# Patient Record
Sex: FEMALE | Race: WHITE | NOT HISPANIC OR LATINO | Employment: UNEMPLOYED | URBAN - NONMETROPOLITAN AREA
[De-identification: names, ages, dates, MRNs, and addresses within clinical notes are randomized per-mention and may not be internally consistent; named-entity substitution may affect disease eponyms.]

---

## 2017-03-14 ENCOUNTER — LAB (OUTPATIENT)
Dept: FAMILY MEDICINE CLINIC | Facility: CLINIC | Age: 72
End: 2017-03-14

## 2017-03-14 DIAGNOSIS — Z00.00 HEALTHCARE MAINTENANCE: ICD-10-CM

## 2017-03-14 DIAGNOSIS — E03.4 HYPOTHYROIDISM DUE TO ACQUIRED ATROPHY OF THYROID: ICD-10-CM

## 2017-03-14 DIAGNOSIS — E55.9 VITAMIN D DEFICIENCY: ICD-10-CM

## 2017-03-14 LAB
25(OH)D3 SERPL-MCNC: 42 NG/ML
ALBUMIN SERPL-MCNC: 4.2 G/DL (ref 3.4–4.8)
ALBUMIN/GLOB SERPL: 1.2 G/DL (ref 1.5–2.5)
ALP SERPL-CCNC: 95 U/L (ref 35–104)
ALT SERPL W P-5'-P-CCNC: 15 U/L (ref 10–36)
ANION GAP SERPL CALCULATED.3IONS-SCNC: 6.2 MMOL/L (ref 3.6–11.2)
AST SERPL-CCNC: 24 U/L (ref 10–30)
BASOPHILS # BLD AUTO: 0.06 10*3/MM3 (ref 0–0.3)
BASOPHILS NFR BLD AUTO: 0.9 % (ref 0–2)
BILIRUB SERPL-MCNC: 0.5 MG/DL (ref 0.2–1.8)
BUN BLD-MCNC: 12 MG/DL (ref 7–21)
BUN/CREAT SERPL: 13 (ref 7–25)
CALCIUM SPEC-SCNC: 9.4 MG/DL (ref 7.7–10)
CHLORIDE SERPL-SCNC: 104 MMOL/L (ref 99–112)
CHOLEST SERPL-MCNC: 198 MG/DL (ref 0–200)
CO2 SERPL-SCNC: 29.8 MMOL/L (ref 24.3–31.9)
CREAT BLD-MCNC: 0.92 MG/DL (ref 0.43–1.29)
DEPRECATED RDW RBC AUTO: 44.2 FL (ref 37–54)
EOSINOPHIL # BLD AUTO: 0.27 10*3/MM3 (ref 0–0.7)
EOSINOPHIL NFR BLD AUTO: 4.1 % (ref 0–7)
ERYTHROCYTE [DISTWIDTH] IN BLOOD BY AUTOMATED COUNT: 13.5 % (ref 11.5–14.5)
GFR SERPL CREATININE-BSD FRML MDRD: 60 ML/MIN/1.73
GLOBULIN UR ELPH-MCNC: 3.5 GM/DL
GLUCOSE BLD-MCNC: 97 MG/DL (ref 70–110)
HCT VFR BLD AUTO: 41.9 % (ref 37–47)
HDLC SERPL-MCNC: 49 MG/DL (ref 60–100)
HGB BLD-MCNC: 13.4 G/DL (ref 12–16)
IMM GRANULOCYTES # BLD: 0.01 10*3/MM3 (ref 0–0.03)
IMM GRANULOCYTES NFR BLD: 0.2 % (ref 0–0.5)
LDLC SERPL CALC-MCNC: 126 MG/DL (ref 0–100)
LDLC/HDLC SERPL: 2.58 {RATIO}
LYMPHOCYTES # BLD AUTO: 1.91 10*3/MM3 (ref 1–3)
LYMPHOCYTES NFR BLD AUTO: 29.2 % (ref 16–46)
MCH RBC QN AUTO: 29.3 PG (ref 27–33)
MCHC RBC AUTO-ENTMCNC: 32 G/DL (ref 33–37)
MCV RBC AUTO: 91.7 FL (ref 80–94)
MONOCYTES # BLD AUTO: 0.51 10*3/MM3 (ref 0.1–0.9)
MONOCYTES NFR BLD AUTO: 7.8 % (ref 0–12)
NEUTROPHILS # BLD AUTO: 3.79 10*3/MM3 (ref 1.4–6.5)
NEUTROPHILS NFR BLD AUTO: 57.8 % (ref 40–75)
OSMOLALITY SERPL CALC.SUM OF ELEC: 279.1 MOSM/KG (ref 273–305)
PLATELET # BLD AUTO: 389 10*3/MM3 (ref 130–400)
PMV BLD AUTO: 11.2 FL (ref 6–10)
POTASSIUM BLD-SCNC: 3.9 MMOL/L (ref 3.5–5.3)
PROT SERPL-MCNC: 7.7 G/DL (ref 6–8)
RBC # BLD AUTO: 4.57 10*6/MM3 (ref 4.2–5.4)
SODIUM BLD-SCNC: 140 MMOL/L (ref 135–153)
TRIGL SERPL-MCNC: 114 MG/DL (ref 0–150)
TSH SERPL DL<=0.05 MIU/L-ACNC: 4.71 MIU/ML (ref 0.55–4.78)
VLDLC SERPL-MCNC: 22.8 MG/DL
WBC NRBC COR # BLD: 6.55 10*3/MM3 (ref 4.5–12.5)

## 2017-03-14 PROCEDURE — 85025 COMPLETE CBC W/AUTO DIFF WBC: CPT | Performed by: GENERAL PRACTICE

## 2017-03-14 PROCEDURE — 84443 ASSAY THYROID STIM HORMONE: CPT | Performed by: GENERAL PRACTICE

## 2017-03-14 PROCEDURE — 82306 VITAMIN D 25 HYDROXY: CPT | Performed by: GENERAL PRACTICE

## 2017-03-14 PROCEDURE — 80061 LIPID PANEL: CPT | Performed by: GENERAL PRACTICE

## 2017-03-14 PROCEDURE — 36415 COLL VENOUS BLD VENIPUNCTURE: CPT

## 2017-03-14 PROCEDURE — 80053 COMPREHEN METABOLIC PANEL: CPT | Performed by: GENERAL PRACTICE

## 2017-03-28 ENCOUNTER — OFFICE VISIT (OUTPATIENT)
Dept: FAMILY MEDICINE CLINIC | Facility: CLINIC | Age: 72
End: 2017-03-28

## 2017-03-28 DIAGNOSIS — E03.4 HYPOTHYROIDISM DUE TO ACQUIRED ATROPHY OF THYROID: ICD-10-CM

## 2017-03-28 DIAGNOSIS — Z12.31 ENCOUNTER FOR SCREENING MAMMOGRAM FOR BREAST CANCER: ICD-10-CM

## 2017-03-28 DIAGNOSIS — G89.29 CHRONIC PAIN OF BOTH KNEES: ICD-10-CM

## 2017-03-28 DIAGNOSIS — F32.89 OTHER DEPRESSION: ICD-10-CM

## 2017-03-28 DIAGNOSIS — N95.1 MENOPAUSAL SYMPTOM: ICD-10-CM

## 2017-03-28 DIAGNOSIS — E66.01 MORBID OBESITY, UNSPECIFIED OBESITY TYPE (HCC): ICD-10-CM

## 2017-03-28 DIAGNOSIS — Z00.00 HEALTHCARE MAINTENANCE: ICD-10-CM

## 2017-03-28 DIAGNOSIS — M25.561 CHRONIC PAIN OF BOTH KNEES: ICD-10-CM

## 2017-03-28 DIAGNOSIS — M25.562 CHRONIC PAIN OF BOTH KNEES: ICD-10-CM

## 2017-03-28 DIAGNOSIS — E55.9 VITAMIN D DEFICIENCY: Primary | ICD-10-CM

## 2017-03-28 DIAGNOSIS — M85.80 OSTEOPENIA: ICD-10-CM

## 2017-03-28 PROCEDURE — 99214 OFFICE O/P EST MOD 30 MIN: CPT | Performed by: GENERAL PRACTICE

## 2017-03-28 RX ORDER — PHENTERMINE HYDROCHLORIDE 37.5 MG/1
37.5 TABLET ORAL
Qty: 30 TABLET | Refills: 5 | Status: SHIPPED | OUTPATIENT
Start: 2017-03-28 | End: 2017-09-27 | Stop reason: SDUPTHER

## 2017-03-28 RX ORDER — ESTRADIOL 0.1 MG/G
CREAM VAGINAL
Qty: 42.5 G | Refills: 5 | Status: SHIPPED | OUTPATIENT
Start: 2017-03-28 | End: 2017-09-27 | Stop reason: SDUPTHER

## 2017-03-28 RX ORDER — LEVOTHYROXINE SODIUM 112 UG/1
112 TABLET ORAL DAILY
Qty: 30 TABLET | Refills: 5 | Status: SHIPPED | OUTPATIENT
Start: 2017-03-28 | End: 2017-09-27 | Stop reason: SDUPTHER

## 2017-03-28 NOTE — PROGRESS NOTES
Subjective   Tennille Gasca is a 72 y.o. female.     History of Present Illness     Knee Pain  There has been some improvement in her knee pain since last here.  There has been no change in the quality nor any new associated symptoms.  Current symptoms include pain located anteriorly and stiffness. Pain is aggravated by rising after sitting and prolonged weightbearing. Patient has had no prior knee problems. Evaluation to date: none. Treatment to date: none.    Depression  Onset was a number of years ago. Symptoms have remained controlled since last here. Current symptoms include: insomnia. Patient denies depressed mood, anhedonia, difficulty concentrating, impaired memory, recurrent thoughts of death and suicidal thoughts.     Hypothyroidism  Patient presents for evaluation of thyroid function. Symptoms consist of fatigue, weight gain. The symptoms are mild.  The problem has been unchanged.  Previous thyroid studies include TSH. The hypothyroidism is due to Hashimoto's disease. She is currently prescribed levothyroxine. Most recent TSH:   Lab Results   Component Value Date    TSH 4.715 03/14/2017     Labs  Most recent fasting glucose 97.  , HDL 49, and .  Vitamin D 42    The following portions of the patient's history were reviewed and updated as appropriate: allergies, current medications, past medical history, past social history and problem list.    Review of Systems   Constitutional: Positive for fatigue. Negative for appetite change, chills, fever and unexpected weight change.   HENT: Negative for congestion, ear pain, rhinorrhea, sneezing, sore throat and voice change.    Eyes: Negative for visual disturbance.   Respiratory: Negative for cough, shortness of breath and wheezing.    Cardiovascular: Negative for chest pain, palpitations and leg swelling.   Gastrointestinal: Negative for abdominal pain, blood in stool, constipation, diarrhea, nausea and vomiting.   Endocrine: Negative for  polydipsia.   Genitourinary: Negative for difficulty urinating, dysuria, frequency, hematuria, menstrual problem, pelvic pain, urgency, vaginal bleeding and vaginal discharge.   Musculoskeletal: Positive for arthralgias. Negative for back pain, joint swelling, myalgias and neck pain.   Skin: Negative for color change.   Neurological: Negative for tremors, weakness, numbness and headaches.   Psychiatric/Behavioral: Positive for sleep disturbance. Negative for dysphoric mood and suicidal ideas. The patient is not nervous/anxious.      Objective   Physical Exam   Constitutional: She is oriented to person, place, and time. No distress.   Bright and in good spirits. No apparent distress. No pallor, jaundice, diaphoresis, or cyanosis   HENT:   Head: Atraumatic.   Right Ear: Tympanic membrane, external ear and ear canal normal.   Left Ear: Tympanic membrane, external ear and ear canal normal.   Nose: Nose normal.   Mouth/Throat: Oropharynx is clear and moist. Mucous membranes are not pale and not cyanotic.   Eyes: EOM are normal. Pupils are equal, round, and reactive to light. No scleral icterus.   Neck: No JVD present. Carotid bruit is not present. No tracheal deviation present. No thyromegaly present.   Cardiovascular: Normal rate, regular rhythm, S1 normal, S2 normal and intact distal pulses.  Exam reveals no gallop, no S3 and no S4.    No murmur heard.  Pulmonary/Chest: Breath sounds normal. No stridor. No respiratory distress.   Abdominal: Soft. Normal aorta and bowel sounds are normal. She exhibits no distension, no abdominal bruit and no mass. There is no hepatosplenomegaly. There is no tenderness. No hernia.   Musculoskeletal: She exhibits no deformity.       Vascular Status -  Her exam exhibits no right foot edema. Her exam exhibits no left foot edema.  Lymphadenopathy:        Head (right side): No submandibular adenopathy present.        Head (left side): No submandibular adenopathy present.     She has no  cervical adenopathy.   Neurological: She is alert and oriented to person, place, and time. She has normal reflexes. She displays normal reflexes. No cranial nerve deficit. She exhibits normal muscle tone. Coordination normal.   Skin: Skin is warm and dry. No rash noted. She is not diaphoretic. No cyanosis. No pallor. Nails show no clubbing.   Psychiatric: She has a normal mood and affect.     Assessment/Plan   Problems Addressed this Visit        Digestive    Vitamin D deficiency    Morbid obesity  Encouraged to continue to work on her diet and exercise plan.  Continue current treatment     Relevant Medications    phentermine (ADIPEX-P) 37.5 MG tablet       Endocrine    Hypothyroidism due to acquired atrophy of thyroid  Clinically euthyroid.   Continue current treatment.    Relevant Medications    levothyroxine (SYNTHROID, LEVOTHROID) 112 MCG tablet       Musculoskeletal and Integument    Osteopenia    Bilateral knee pain  Probable early osteoarthritis.  Reminded regarding appropriate exercises and joint protection.  Patient remains uninterested in pursuing anything further at present but will report if any worse or if any new symptoms whatsoever       Genitourinary    Menopausal symptom    Relevant Medications    estradiol (ESTRACE) 0.1 MG/GM vaginal cream       Other    Depression  Remains in remission.  Supportive therapy.    Continue current treatment     Healthcare maintenance  Patient had a normal colonoscopy on 6/23/11 and has no family history of early colon cancer.  She would like to defer her next study for now    Encounter for screening mammogram for breast cancer   Relevant Orders   Mammo Screening Digital Tomosynthesis Bilateral With CAD

## 2017-03-29 VITALS
TEMPERATURE: 98.2 F | DIASTOLIC BLOOD PRESSURE: 75 MMHG | BODY MASS INDEX: 35.79 KG/M2 | OXYGEN SATURATION: 99 % | RESPIRATION RATE: 12 BRPM | WEIGHT: 228 LBS | HEART RATE: 90 BPM | SYSTOLIC BLOOD PRESSURE: 125 MMHG | HEIGHT: 67 IN

## 2017-08-07 ENCOUNTER — APPOINTMENT (OUTPATIENT)
Dept: MAMMOGRAPHY | Facility: HOSPITAL | Age: 72
End: 2017-08-07

## 2017-09-27 DIAGNOSIS — N95.1 MENOPAUSAL SYMPTOM: ICD-10-CM

## 2017-09-27 DIAGNOSIS — E03.4 HYPOTHYROIDISM DUE TO ACQUIRED ATROPHY OF THYROID: ICD-10-CM

## 2017-09-27 DIAGNOSIS — E66.01 MORBID OBESITY, UNSPECIFIED OBESITY TYPE (HCC): ICD-10-CM

## 2017-09-27 RX ORDER — PHENTERMINE HYDROCHLORIDE 37.5 MG/1
37.5 TABLET ORAL
Qty: 30 TABLET | Refills: 5 | Status: SHIPPED | OUTPATIENT
Start: 2017-09-27 | End: 2017-11-27 | Stop reason: SDUPTHER

## 2017-09-27 RX ORDER — ESTRADIOL 0.1 MG/G
CREAM VAGINAL
Qty: 42.5 G | Refills: 5 | Status: SHIPPED | OUTPATIENT
Start: 2017-09-27 | End: 2018-02-05 | Stop reason: SDUPTHER

## 2017-09-27 RX ORDER — LEVOTHYROXINE SODIUM 112 UG/1
112 TABLET ORAL DAILY
Qty: 30 TABLET | Refills: 5 | Status: SHIPPED | OUTPATIENT
Start: 2017-09-27 | End: 2018-05-04

## 2017-10-12 ENCOUNTER — OFFICE VISIT (OUTPATIENT)
Dept: FAMILY MEDICINE CLINIC | Facility: CLINIC | Age: 72
End: 2017-10-12

## 2017-10-12 VITALS
HEART RATE: 92 BPM | DIASTOLIC BLOOD PRESSURE: 70 MMHG | WEIGHT: 221 LBS | SYSTOLIC BLOOD PRESSURE: 140 MMHG | RESPIRATION RATE: 12 BRPM | HEIGHT: 67 IN | TEMPERATURE: 98.6 F | OXYGEN SATURATION: 90 % | BODY MASS INDEX: 34.69 KG/M2

## 2017-10-12 DIAGNOSIS — R30.0 DYSURIA: Primary | ICD-10-CM

## 2017-10-12 DIAGNOSIS — E55.9 VITAMIN D DEFICIENCY: ICD-10-CM

## 2017-10-12 DIAGNOSIS — Z00.00 HEALTHCARE MAINTENANCE: ICD-10-CM

## 2017-10-12 LAB
BACTERIA UR QL AUTO: ABNORMAL /HPF
BILIRUB BLD-MCNC: NEGATIVE MG/DL
BILIRUB UR QL STRIP: NEGATIVE
CLARITY UR: ABNORMAL
CLARITY, POC: CLEAR
COLOR UR: NORMAL
COLOR UR: YELLOW
GLUCOSE UR STRIP-MCNC: NEGATIVE MG/DL
GLUCOSE UR STRIP-MCNC: NEGATIVE MG/DL
HGB UR QL STRIP.AUTO: NEGATIVE
HYALINE CASTS UR QL AUTO: ABNORMAL /LPF
KETONES UR QL STRIP: NEGATIVE
KETONES UR QL: NEGATIVE
LEUKOCYTE EST, POC: NEGATIVE
LEUKOCYTE ESTERASE UR QL STRIP.AUTO: ABNORMAL
NITRITE UR QL STRIP: NEGATIVE
NITRITE UR-MCNC: NEGATIVE MG/ML
PH UR STRIP.AUTO: <=5 [PH] (ref 5–8)
PH UR: 5.5 [PH] (ref 5–8)
PROT UR QL STRIP: NEGATIVE
PROT UR STRIP-MCNC: NEGATIVE MG/DL
RBC # UR STRIP: NEGATIVE /UL
RBC # UR: ABNORMAL /HPF
REF LAB TEST METHOD: ABNORMAL
SP GR UR STRIP: 1.01 (ref 1–1.03)
SP GR UR: 1.02 (ref 1–1.03)
SQUAMOUS #/AREA URNS HPF: ABNORMAL /HPF
UROBILINOGEN UR QL STRIP: ABNORMAL
UROBILINOGEN UR QL: NORMAL
WBC UR QL AUTO: ABNORMAL /HPF

## 2017-10-12 PROCEDURE — 81001 URINALYSIS AUTO W/SCOPE: CPT | Performed by: GENERAL PRACTICE

## 2017-10-12 PROCEDURE — 99212 OFFICE O/P EST SF 10 MIN: CPT | Performed by: GENERAL PRACTICE

## 2017-10-12 PROCEDURE — 87086 URINE CULTURE/COLONY COUNT: CPT | Performed by: GENERAL PRACTICE

## 2017-10-12 PROCEDURE — 81003 URINALYSIS AUTO W/O SCOPE: CPT | Performed by: GENERAL PRACTICE

## 2017-10-12 NOTE — PROGRESS NOTES
Subjective   Tennille Gasca is a 72 y.o. female.     History of Present Illness     Dysuria  Since waking this morning she has had mild dysuria.  There is no history of any frequency, nocturia, urgency, hematuria, suprapubic pain, or back pain.  There is no history of any vaginal discharge or pruritus and she denies any fever or chills.  She has been on no recent antibiotics.    The following portions of the patient's history were reviewed and updated as appropriate: allergies, current medications, past medical history and problem list.    Review of Systems   Constitutional: Positive for fatigue. Negative for appetite change, chills, fever and unexpected weight change.   HENT: Negative for congestion, ear pain, rhinorrhea, sneezing, sore throat and voice change.    Eyes: Negative for visual disturbance.   Respiratory: Negative for cough, shortness of breath and wheezing.    Cardiovascular: Negative for chest pain, palpitations and leg swelling.   Gastrointestinal: Negative for abdominal pain, blood in stool, constipation, diarrhea, nausea and vomiting.   Endocrine: Negative for polydipsia.   Genitourinary: Positive for dysuria. Negative for difficulty urinating, frequency, hematuria, menstrual problem, pelvic pain, urgency, vaginal bleeding and vaginal discharge.   Musculoskeletal: Positive for arthralgias. Negative for back pain, joint swelling, myalgias and neck pain.   Skin: Negative for color change.   Neurological: Negative for tremors, weakness, numbness and headaches.   Psychiatric/Behavioral: Positive for sleep disturbance. Negative for dysphoric mood and suicidal ideas. The patient is not nervous/anxious.      Objective   Physical Exam   Constitutional: She is oriented to person, place, and time. No distress.   Alert and in fair spirits. No apparent distress. No pallor, jaundice, diaphoresis, or cyanosis   HENT:   Head: Atraumatic.   Right Ear: Tympanic membrane, external ear and ear canal normal.   Left  Ear: Tympanic membrane, external ear and ear canal normal.   Nose: Nose normal.   Mouth/Throat: Oropharynx is clear and moist. Mucous membranes are not pale and not cyanotic.   Eyes: EOM are normal. Pupils are equal, round, and reactive to light. No scleral icterus.   Neck: No JVD present. Carotid bruit is not present. No tracheal deviation present. No thyromegaly present.   Cardiovascular: Normal rate, regular rhythm, S1 normal, S2 normal and intact distal pulses.  Exam reveals no gallop, no S3 and no S4.    No murmur heard.  Pulmonary/Chest: Breath sounds normal. No stridor. No respiratory distress.   Abdominal: Soft. Normal aorta and bowel sounds are normal. She exhibits no distension, no abdominal bruit and no mass. There is no hepatosplenomegaly. There is no tenderness. No hernia.   Musculoskeletal: She exhibits no deformity.       Vascular Status -  Her exam exhibits no right foot edema. Her exam exhibits no left foot edema.  Lymphadenopathy:        Head (right side): No submandibular adenopathy present.        Head (left side): No submandibular adenopathy present.     She has no cervical adenopathy.   Neurological: She is alert and oriented to person, place, and time. No cranial nerve deficit.   Skin: Skin is warm and dry. No rash noted. She is not diaphoretic. No cyanosis. No pallor. Nails show no clubbing.   Psychiatric: She has a normal mood and affect.     Assessment/Plan   Problems Addressed this Visit        Nervous and Auditory   Dysuria   Mild.  No associated symptoms.  Unremarkable urinalysis  Reviewed options.  Will monitor.  Patient will report if any worse, any new symptoms, or if this does not resolve over the next day or two   Relevant Orders   POCT urinalysis dipstick, automated (Completed)   Urine Culture - Urine, Urine, Clean Catch   Urinalysis With Microscopic - Urine, Clean Catch (Completed)   Urinalysis - Urine, Clean Catch (Completed)   Urinalysis, Microscopic Only - Urine, Clean Catch  (Completed)      Other   Healthcare maintenance  Reminded to get a flu shot when available

## 2017-10-14 LAB — BACTERIA SPEC AEROBE CULT: NORMAL

## 2017-11-27 ENCOUNTER — OFFICE VISIT (OUTPATIENT)
Dept: FAMILY MEDICINE CLINIC | Facility: CLINIC | Age: 72
End: 2017-11-27

## 2017-11-27 VITALS
BODY MASS INDEX: 34.37 KG/M2 | RESPIRATION RATE: 12 BRPM | TEMPERATURE: 98.9 F | OXYGEN SATURATION: 96 % | DIASTOLIC BLOOD PRESSURE: 80 MMHG | WEIGHT: 219 LBS | HEART RATE: 100 BPM | SYSTOLIC BLOOD PRESSURE: 155 MMHG | HEIGHT: 67 IN

## 2017-11-27 DIAGNOSIS — E66.01 MORBID OBESITY, UNSPECIFIED OBESITY TYPE (HCC): ICD-10-CM

## 2017-11-27 DIAGNOSIS — R63.5 ABNORMAL WEIGHT GAIN: ICD-10-CM

## 2017-11-27 DIAGNOSIS — Z23 ENCOUNTER FOR IMMUNIZATION: ICD-10-CM

## 2017-11-27 DIAGNOSIS — E55.9 VITAMIN D DEFICIENCY: Primary | ICD-10-CM

## 2017-11-27 DIAGNOSIS — R03.0 ELEVATED BLOOD-PRESSURE READING WITHOUT DIAGNOSIS OF HYPERTENSION: ICD-10-CM

## 2017-11-27 DIAGNOSIS — M25.561 CHRONIC PAIN OF BOTH KNEES: ICD-10-CM

## 2017-11-27 DIAGNOSIS — F33.41 RECURRENT MAJOR DEPRESSIVE DISORDER, IN PARTIAL REMISSION (HCC): ICD-10-CM

## 2017-11-27 DIAGNOSIS — M85.80 OSTEOPENIA, UNSPECIFIED LOCATION: ICD-10-CM

## 2017-11-27 DIAGNOSIS — M25.562 CHRONIC PAIN OF BOTH KNEES: ICD-10-CM

## 2017-11-27 DIAGNOSIS — G89.29 CHRONIC PAIN OF BOTH KNEES: ICD-10-CM

## 2017-11-27 DIAGNOSIS — N95.1 MENOPAUSAL SYMPTOM: ICD-10-CM

## 2017-11-27 DIAGNOSIS — Z00.00 HEALTHCARE MAINTENANCE: ICD-10-CM

## 2017-11-27 DIAGNOSIS — Z86.19 H/O CANDIDAL VULVOVAGINITIS: ICD-10-CM

## 2017-11-27 DIAGNOSIS — E66.01 MORBID OBESITY (HCC): ICD-10-CM

## 2017-11-27 DIAGNOSIS — E03.4 HYPOTHYROIDISM DUE TO ACQUIRED ATROPHY OF THYROID: ICD-10-CM

## 2017-11-27 PROBLEM — R30.0 DYSURIA: Status: RESOLVED | Noted: 2017-10-12 | Resolved: 2017-11-27

## 2017-11-27 PROCEDURE — 99214 OFFICE O/P EST MOD 30 MIN: CPT | Performed by: GENERAL PRACTICE

## 2017-11-27 PROCEDURE — 90686 IIV4 VACC NO PRSV 0.5 ML IM: CPT | Performed by: GENERAL PRACTICE

## 2017-11-27 PROCEDURE — G0008 ADMIN INFLUENZA VIRUS VAC: HCPCS | Performed by: GENERAL PRACTICE

## 2017-11-27 RX ORDER — FLUCONAZOLE 150 MG/1
150 TABLET ORAL DAILY
Qty: 6 TABLET | Refills: 0 | Status: SHIPPED | OUTPATIENT
Start: 2017-11-27 | End: 2018-05-04

## 2017-11-27 RX ORDER — PHENTERMINE HYDROCHLORIDE 37.5 MG/1
37.5 TABLET ORAL
Qty: 30 TABLET | Refills: 5 | Status: SHIPPED | OUTPATIENT
Start: 2017-11-27 | End: 2018-05-04 | Stop reason: SDUPTHER

## 2017-11-27 NOTE — PROGRESS NOTES
Subjective   Tennille Gasca is a 72 y.o. female.     History of Present Illness     Depression  Onset was a number of years ago. Symptoms have been somewhat worse over the last four months as her  has struggled with a series of health problems.  Current symptoms include: nervousness and worrying. Patient denies depressed mood, anhedonia, difficulty concentrating, impaired memory, recurrent thoughts of death and suicidal thoughts.  With melatonin she is averaging 7-8 hours of sleep nightly.    Knee Pain  She continues to have intermittent knee pain as previously described. There has been no change in the quality nor any new associated symptoms.  Current symptoms include pain located anteriorly and stiffness. Pain is aggravated by rising after sitting and prolonged weightbearing. Patient has had no prior knee problems. Evaluation to date: none. Treatment to date: none.    Hypothyroidism  Patient returns for evaluation of thyroid function. Symptoms consist of fatigue, weight gain. The symptoms are mild.  The problem has been unchanged.  Previous thyroid studies include TSH. The hypothyroidism is due to Hashimoto's disease. She is currently prescribed levothyroxine. Most recent TSH:   Lab Results   Component Value Date    TSH 4.715 03/14/2017     Labs  Most recent fasting glucose 97.  , HDL 49, and .  Vitamin D 42    The following portions of the patient's history were reviewed and updated as appropriate: allergies, current medications, past medical history, past social history and problem list.    Review of Systems   Constitutional: Positive for fatigue. Negative for appetite change, chills, fever and unexpected weight change.   HENT: Negative for congestion, ear pain, rhinorrhea, sneezing, sore throat and voice change.    Eyes: Negative for visual disturbance.   Respiratory: Negative for cough, shortness of breath and wheezing.    Cardiovascular: Negative for chest pain, palpitations and leg  swelling.   Gastrointestinal: Negative for abdominal pain, blood in stool, constipation, diarrhea, nausea and vomiting.   Endocrine: Negative for polydipsia.   Genitourinary: Negative for difficulty urinating, dysuria, frequency, hematuria, menstrual problem, pelvic pain, urgency, vaginal bleeding and vaginal discharge.   Musculoskeletal: Positive for arthralgias. Negative for back pain, joint swelling, myalgias and neck pain.   Skin: Negative for color change.   Neurological: Negative for tremors, weakness, numbness and headaches.   Psychiatric/Behavioral: Negative for dysphoric mood, sleep disturbance and suicidal ideas. The patient is nervous/anxious.      Objective   Physical Exam   Constitutional: She is oriented to person, place, and time. No distress.   Alert and in fair spirits. No apparent distress. No pallor, jaundice, diaphoresis, or cyanosis   HENT:   Head: Atraumatic.   Right Ear: Tympanic membrane, external ear and ear canal normal.   Left Ear: Tympanic membrane, external ear and ear canal normal.   Nose: Nose normal.   Mouth/Throat: Oropharynx is clear and moist. Mucous membranes are not pale and not cyanotic.   Eyes: EOM are normal. Pupils are equal, round, and reactive to light. No scleral icterus.   Neck: No JVD present. Carotid bruit is not present. No tracheal deviation present. No thyromegaly present.   Cardiovascular: Normal rate, regular rhythm, S1 normal, S2 normal and intact distal pulses.  Exam reveals no gallop, no S3 and no S4.    No murmur heard.  Pulmonary/Chest: Breath sounds normal. No stridor. No respiratory distress.   Abdominal: Soft. Normal aorta and bowel sounds are normal. She exhibits no distension, no abdominal bruit and no mass. There is no hepatosplenomegaly. There is no tenderness. No hernia.   Musculoskeletal: She exhibits no deformity.       Vascular Status -  Her exam exhibits no right foot edema. Her exam exhibits no left foot edema.  Lymphadenopathy:        Head (right  side): No submandibular adenopathy present.        Head (left side): No submandibular adenopathy present.     She has no cervical adenopathy.   Neurological: She is alert and oriented to person, place, and time. No cranial nerve deficit.   Skin: Skin is warm and dry. No rash noted. She is not diaphoretic. No cyanosis. No pallor. Nails show no clubbing.   Psychiatric: She has a normal mood and affect.     Assessment/Plan   Problems Addressed this Visit        Digestive    Vitamin D deficiency  Continue maintenance supplementation with monitoring    Morbid obesity  Encouraged to continue to work on her diet and exercise plan.  Continue current medication  Updated labs will be drawn in 4 months     Relevant Medications    phentermine (ADIPEX-P) 37.5 MG tablet    Other Relevant Orders    TSH       Endocrine    Hypothyroidism due to acquired atrophy of thyroid  Clinically euthyroid.   Continue current medication       Musculoskeletal and Integument    Osteopenia    Relevant Orders    Vitamin D 25 Hydroxy    Bilateral knee pain  Reminded regarding symptomatic treatment.        Genitourinary    Menopausal symptom       Other    Recurrent major depressive disorder, in partial remission    Significant situational component. Supportive therapy.         Healthcare maintenance  Recommended a flu shot  Hepatitis C screen will be performed with her next labs     Relevant Orders    Hepatitis C Antibody    Flu Vaccine Quad PF 3YR+ (Completed)    H/O candidal vulvovaginitis    Relevant Medications    fluconazole (DIFLUCAN) 150 MG tablet    Elevated blood-pressure reading without diagnosis of hypertension  Advise regarding lifestyle modification  Will continue to monitor    Relevant Orders    CBC & Differential    Comprehensive Metabolic Panel    Lipid Panel    TSH    Encounter for immunization    Relevant Orders    Flu Vaccine Quad PF 3YR+ (Completed)

## 2018-01-07 DIAGNOSIS — E03.4 HYPOTHYROIDISM DUE TO ACQUIRED ATROPHY OF THYROID: ICD-10-CM

## 2018-01-08 RX ORDER — LEVOTHYROXINE SODIUM 112 MCG
TABLET ORAL
Qty: 30 TABLET | Refills: 4 | Status: SHIPPED | OUTPATIENT
Start: 2018-01-08 | End: 2018-05-04 | Stop reason: SDUPTHER

## 2018-02-02 ENCOUNTER — TELEPHONE (OUTPATIENT)
Dept: FAMILY MEDICINE CLINIC | Facility: CLINIC | Age: 73
End: 2018-02-02

## 2018-02-02 NOTE — TELEPHONE ENCOUNTER
----- Message from Alfonso Rubi MD sent at 1/31/2018  8:37 AM EST -----  Ok - she can run the estrace again and see what the copay will be - if too high she should let us know and I will send the premarin    ----- Message -----     From: Tania Camara MA     Sent: 1/30/2018   6:04 PM       To: Alfonso Rubi MD    Yeah it shows that its a T3 now. Tier 2 cream is Premarin. The rest are oral.     I was able to PA the cream tho so maybe that will help the copay.       ----- Message -----     From: Alfonso Rubi MD     Sent: 1/29/2018   8:57 PM       To: Tania Camara MA    Please find out if there is a form of estrogen cream that would be cheaper for her - apparently her insurance no longer covers Estrace cream and it will cost her $300

## 2018-02-05 DIAGNOSIS — N95.1 MENOPAUSAL SYMPTOM: ICD-10-CM

## 2018-02-05 RX ORDER — ESTRADIOL 0.1 MG/G
CREAM VAGINAL
Qty: 42.5 G | Refills: 5 | Status: SHIPPED | OUTPATIENT
Start: 2018-02-05 | End: 2018-02-07 | Stop reason: SDUPTHER

## 2018-02-07 DIAGNOSIS — N95.1 MENOPAUSAL SYMPTOM: ICD-10-CM

## 2018-02-07 RX ORDER — ESTRADIOL 0.1 MG/G
1 CREAM VAGINAL 3 TIMES WEEKLY
Qty: 42.5 G | Refills: 5 | Status: SHIPPED | OUTPATIENT
Start: 2018-02-07 | End: 2019-03-20 | Stop reason: SDUPTHER

## 2018-03-27 ENCOUNTER — LAB (OUTPATIENT)
Dept: FAMILY MEDICINE CLINIC | Facility: CLINIC | Age: 73
End: 2018-03-27

## 2018-03-27 DIAGNOSIS — E66.01 MORBID OBESITY, UNSPECIFIED OBESITY TYPE (HCC): ICD-10-CM

## 2018-03-27 DIAGNOSIS — M85.80 OSTEOPENIA, UNSPECIFIED LOCATION: ICD-10-CM

## 2018-03-27 DIAGNOSIS — R63.5 ABNORMAL WEIGHT GAIN: ICD-10-CM

## 2018-03-27 DIAGNOSIS — E66.01 MORBID OBESITY (HCC): ICD-10-CM

## 2018-03-27 DIAGNOSIS — R03.0 ELEVATED BLOOD-PRESSURE READING WITHOUT DIAGNOSIS OF HYPERTENSION: ICD-10-CM

## 2018-03-27 DIAGNOSIS — Z00.00 HEALTHCARE MAINTENANCE: ICD-10-CM

## 2018-03-27 LAB
25(OH)D3 SERPL-MCNC: 62 NG/ML
ALBUMIN SERPL-MCNC: 4.2 G/DL (ref 3.4–4.8)
ALBUMIN/GLOB SERPL: 1.5 G/DL (ref 1.5–2.5)
ALP SERPL-CCNC: 93 U/L (ref 35–104)
ALT SERPL W P-5'-P-CCNC: 17 U/L (ref 10–36)
ANION GAP SERPL CALCULATED.3IONS-SCNC: 8.2 MMOL/L (ref 3.6–11.2)
AST SERPL-CCNC: 21 U/L (ref 10–30)
BASOPHILS # BLD AUTO: 0.06 10*3/MM3 (ref 0–0.3)
BASOPHILS NFR BLD AUTO: 1 % (ref 0–2)
BILIRUB SERPL-MCNC: 0.4 MG/DL (ref 0.2–1.8)
BUN BLD-MCNC: 15 MG/DL (ref 7–21)
BUN/CREAT SERPL: 17.9 (ref 7–25)
CALCIUM SPEC-SCNC: 9.2 MG/DL (ref 7.7–10)
CHLORIDE SERPL-SCNC: 106 MMOL/L (ref 99–112)
CHOLEST SERPL-MCNC: 171 MG/DL (ref 0–200)
CO2 SERPL-SCNC: 26.8 MMOL/L (ref 24.3–31.9)
CREAT BLD-MCNC: 0.84 MG/DL (ref 0.43–1.29)
DEPRECATED RDW RBC AUTO: 42.3 FL (ref 37–54)
EOSINOPHIL # BLD AUTO: 0.29 10*3/MM3 (ref 0–0.7)
EOSINOPHIL NFR BLD AUTO: 4.9 % (ref 0–7)
ERYTHROCYTE [DISTWIDTH] IN BLOOD BY AUTOMATED COUNT: 13 % (ref 11.5–14.5)
GFR SERPL CREATININE-BSD FRML MDRD: 67 ML/MIN/1.73
GLOBULIN UR ELPH-MCNC: 2.8 GM/DL
GLUCOSE BLD-MCNC: 92 MG/DL (ref 70–110)
HCT VFR BLD AUTO: 39.6 % (ref 37–47)
HCV AB SER DONR QL: NORMAL
HDLC SERPL-MCNC: 53 MG/DL (ref 60–100)
HGB BLD-MCNC: 13.2 G/DL (ref 12–16)
IMM GRANULOCYTES # BLD: 0.01 10*3/MM3 (ref 0–0.03)
IMM GRANULOCYTES NFR BLD: 0.2 % (ref 0–0.5)
LDLC SERPL CALC-MCNC: 103 MG/DL (ref 0–100)
LDLC/HDLC SERPL: 1.94 {RATIO}
LYMPHOCYTES # BLD AUTO: 1.62 10*3/MM3 (ref 1–3)
LYMPHOCYTES NFR BLD AUTO: 27.5 % (ref 16–46)
MCH RBC QN AUTO: 30.1 PG (ref 27–33)
MCHC RBC AUTO-ENTMCNC: 33.3 G/DL (ref 33–37)
MCV RBC AUTO: 90.4 FL (ref 80–94)
MONOCYTES # BLD AUTO: 0.48 10*3/MM3 (ref 0.1–0.9)
MONOCYTES NFR BLD AUTO: 8.1 % (ref 0–12)
NEUTROPHILS # BLD AUTO: 3.44 10*3/MM3 (ref 1.4–6.5)
NEUTROPHILS NFR BLD AUTO: 58.3 % (ref 40–75)
OSMOLALITY SERPL CALC.SUM OF ELEC: 281.7 MOSM/KG (ref 273–305)
PLATELET # BLD AUTO: 382 10*3/MM3 (ref 130–400)
PMV BLD AUTO: 10.9 FL (ref 6–10)
POTASSIUM BLD-SCNC: 4.1 MMOL/L (ref 3.5–5.3)
PROT SERPL-MCNC: 7 G/DL (ref 6–8)
RBC # BLD AUTO: 4.38 10*6/MM3 (ref 4.2–5.4)
SODIUM BLD-SCNC: 141 MMOL/L (ref 135–153)
TRIGL SERPL-MCNC: 77 MG/DL (ref 0–150)
TSH SERPL DL<=0.05 MIU/L-ACNC: 3.71 MIU/ML (ref 0.55–4.78)
VLDLC SERPL-MCNC: 15.4 MG/DL
WBC NRBC COR # BLD: 5.9 10*3/MM3 (ref 4.5–12.5)

## 2018-03-27 PROCEDURE — 85025 COMPLETE CBC W/AUTO DIFF WBC: CPT | Performed by: GENERAL PRACTICE

## 2018-03-27 PROCEDURE — 36415 COLL VENOUS BLD VENIPUNCTURE: CPT

## 2018-03-27 PROCEDURE — 86803 HEPATITIS C AB TEST: CPT | Performed by: GENERAL PRACTICE

## 2018-03-27 PROCEDURE — 80061 LIPID PANEL: CPT | Performed by: GENERAL PRACTICE

## 2018-03-27 PROCEDURE — 80053 COMPREHEN METABOLIC PANEL: CPT | Performed by: GENERAL PRACTICE

## 2018-03-27 PROCEDURE — 82306 VITAMIN D 25 HYDROXY: CPT | Performed by: GENERAL PRACTICE

## 2018-03-27 PROCEDURE — 84443 ASSAY THYROID STIM HORMONE: CPT | Performed by: GENERAL PRACTICE

## 2018-05-04 ENCOUNTER — OFFICE VISIT (OUTPATIENT)
Dept: FAMILY MEDICINE CLINIC | Facility: CLINIC | Age: 73
End: 2018-05-04

## 2018-05-04 DIAGNOSIS — N95.1 MENOPAUSAL SYMPTOM: ICD-10-CM

## 2018-05-04 DIAGNOSIS — F33.41 RECURRENT MAJOR DEPRESSIVE DISORDER, IN PARTIAL REMISSION (HCC): ICD-10-CM

## 2018-05-04 DIAGNOSIS — M85.80 OSTEOPENIA, UNSPECIFIED LOCATION: ICD-10-CM

## 2018-05-04 DIAGNOSIS — Z00.00 HEALTHCARE MAINTENANCE: ICD-10-CM

## 2018-05-04 DIAGNOSIS — E66.01 MORBID OBESITY, UNSPECIFIED OBESITY TYPE (HCC): ICD-10-CM

## 2018-05-04 DIAGNOSIS — E55.9 VITAMIN D DEFICIENCY: ICD-10-CM

## 2018-05-04 DIAGNOSIS — E66.01 MORBID OBESITY (HCC): Primary | ICD-10-CM

## 2018-05-04 DIAGNOSIS — M25.562 CHRONIC PAIN OF BOTH KNEES: ICD-10-CM

## 2018-05-04 DIAGNOSIS — G89.29 CHRONIC PAIN OF BOTH KNEES: ICD-10-CM

## 2018-05-04 DIAGNOSIS — E03.4 HYPOTHYROIDISM DUE TO ACQUIRED ATROPHY OF THYROID: ICD-10-CM

## 2018-05-04 DIAGNOSIS — M25.561 CHRONIC PAIN OF BOTH KNEES: ICD-10-CM

## 2018-05-04 PROBLEM — R03.0 ELEVATED BLOOD-PRESSURE READING WITHOUT DIAGNOSIS OF HYPERTENSION: Status: RESOLVED | Noted: 2017-11-27 | Resolved: 2018-05-04

## 2018-05-04 PROCEDURE — 99214 OFFICE O/P EST MOD 30 MIN: CPT | Performed by: GENERAL PRACTICE

## 2018-05-04 RX ORDER — PHENTERMINE HYDROCHLORIDE 37.5 MG/1
37.5 TABLET ORAL
Qty: 30 TABLET | Refills: 5 | Status: SHIPPED | OUTPATIENT
Start: 2018-05-04 | End: 2018-06-21 | Stop reason: SDUPTHER

## 2018-05-04 RX ORDER — LEVOTHYROXINE SODIUM 112 MCG
112 TABLET ORAL DAILY
Qty: 30 TABLET | Refills: 5 | Status: SHIPPED | OUTPATIENT
Start: 2018-05-04 | End: 2018-12-03 | Stop reason: SDUPTHER

## 2018-05-04 NOTE — PROGRESS NOTES
Subjective   Tennille Gasca is a 73 y.o. female.     History of Present Illness     Knee Pain  She continues to have intermittent knee pain as previously described. There has been no change in the quality nor any new associated symptoms.  Current symptoms include pain located anteriorly and stiffness. Pain is aggravated by rising after sitting and prolonged weightbearing. Patient has had no prior knee problems. Evaluation to date: none. Treatment to date: none.    Depression  Onset was a number of years ago. She has done better as her husbands health has improved since last here.  Current symptoms include: nervousness and worrying. Patient denies depressed mood, anhedonia, difficulty concentrating, impaired memory, recurrent thoughts of death and suicidal thoughts.  With melatonin she continues to average 7-8 hours of sleep nightly.    Hypothyroidism  Patient returns for evaluation of thyroid function. Symptoms consist of fatigue, weight gain. The symptoms are mild.  The problem has been unchanged.  Previous thyroid studies include TSH. The hypothyroidism is due to Hashimoto's disease. She is currently prescribed levothyroxine. Most recent TSH:   Lab Results   Component Value Date    TSH 3.705 03/27/2018     Labs  Most recent fasting glucose 92.  , HDL 53, and TG 77.  Vitamin D 42    The following portions of the patient's history were reviewed and updated as appropriate: allergies, current medications, past medical history, past social history and problem list.    Review of Systems   Constitutional: Positive for fatigue. Negative for appetite change, chills, fever and unexpected weight change.   HENT: Negative for congestion, ear pain, rhinorrhea, sneezing, sore throat and voice change.    Eyes: Negative for visual disturbance.   Respiratory: Negative for cough, shortness of breath and wheezing.    Cardiovascular: Negative for chest pain, palpitations and leg swelling.   Gastrointestinal: Negative for  abdominal pain, blood in stool, constipation, diarrhea, nausea and vomiting.   Endocrine: Negative for polydipsia.   Genitourinary: Negative for difficulty urinating, dysuria, frequency, hematuria, menstrual problem, pelvic pain, urgency, vaginal bleeding and vaginal discharge.   Musculoskeletal: Positive for arthralgias. Negative for back pain, joint swelling, myalgias and neck pain.   Skin: Negative for color change.   Neurological: Negative for tremors, weakness, numbness and headaches.   Psychiatric/Behavioral: Negative for dysphoric mood, sleep disturbance and suicidal ideas. The patient is nervous/anxious.      Objective   Physical Exam   Constitutional: She is oriented to person, place, and time. No distress.   Alert and in fair spirits. No apparent distress. No pallor, jaundice, diaphoresis, or cyanosis   HENT:   Head: Atraumatic.   Right Ear: Tympanic membrane, external ear and ear canal normal.   Left Ear: Tympanic membrane, external ear and ear canal normal.   Nose: Nose normal.   Mouth/Throat: Oropharynx is clear and moist. Mucous membranes are not pale and not cyanotic.   Eyes: EOM are normal. Pupils are equal, round, and reactive to light. No scleral icterus.   Neck: No JVD present. Carotid bruit is not present. No tracheal deviation present. No thyromegaly present.   Cardiovascular: Normal rate, regular rhythm, S1 normal, S2 normal and intact distal pulses.  Exam reveals no gallop, no S3 and no S4.    No murmur heard.  Pulmonary/Chest: Breath sounds normal. No stridor. No respiratory distress.   Abdominal: Soft. Normal aorta and bowel sounds are normal. She exhibits no distension, no abdominal bruit and no mass. There is no hepatosplenomegaly. There is no tenderness. No hernia.   Musculoskeletal: She exhibits no deformity.     Vascular Status -  Her right foot exhibits no edema. Her left foot exhibits no edema.  Lymphadenopathy:        Head (right side): No submandibular adenopathy present.         Head (left side): No submandibular adenopathy present.     She has no cervical adenopathy.   Neurological: She is alert and oriented to person, place, and time. No cranial nerve deficit.   Skin: Skin is warm and dry. No rash noted. She is not diaphoretic. No cyanosis. No pallor. Nails show no clubbing.   Psychiatric: She has a normal mood and affect.     Assessment/Plan   Problems Addressed this Visit        Digestive    Vitamin D deficiency  Corrected  Continue maintenance supplementation with monitoring.    Morbid obesity   Encouraged to continue to work on her diet and exercise plan.  Continue current medication    Relevant Medications    phentermine (ADIPEX-P) 37.5 MG tablet       Endocrine    Hypothyroidism due to acquired atrophy of thyroid  Clinically and bio-chemically euthyroid.  Continue current medication.    Relevant Medications    SYNTHROID 112 MCG tablet       Musculoskeletal and Integument    Osteopenia  Encouraged to continue to pursue weight bearing activities while exercising joint protection.    Bilateral knee pain  Reminded regarding symptomatic treatment.        Genitourinary    Menopausal symptom       Other    Recurrent major depressive disorder, in partial remission    Healthcare maintenance  Encouraged to follow up with her mammogram  Reminded that she is due for an updated colonoscopy  Reviewed the potential benefits of shingrix. Prescription written.   Relevant Medications   Zoster Vac Recomb Adjuvanted (SHINGRIX) 50 MCG reconstituted suspension

## 2018-05-05 VITALS
TEMPERATURE: 98.4 F | DIASTOLIC BLOOD PRESSURE: 80 MMHG | RESPIRATION RATE: 12 BRPM | HEART RATE: 96 BPM | WEIGHT: 225 LBS | OXYGEN SATURATION: 97 % | HEIGHT: 67 IN | SYSTOLIC BLOOD PRESSURE: 125 MMHG | BODY MASS INDEX: 35.31 KG/M2

## 2018-06-21 DIAGNOSIS — E66.01 MORBID OBESITY, UNSPECIFIED OBESITY TYPE (HCC): ICD-10-CM

## 2018-06-25 ENCOUNTER — TELEPHONE (OUTPATIENT)
Dept: FAMILY MEDICINE CLINIC | Facility: CLINIC | Age: 73
End: 2018-06-25

## 2018-11-05 ENCOUNTER — OFFICE VISIT (OUTPATIENT)
Dept: FAMILY MEDICINE CLINIC | Facility: CLINIC | Age: 73
End: 2018-11-05

## 2018-11-05 VITALS
HEIGHT: 67 IN | TEMPERATURE: 97.8 F | OXYGEN SATURATION: 99 % | DIASTOLIC BLOOD PRESSURE: 70 MMHG | SYSTOLIC BLOOD PRESSURE: 120 MMHG | WEIGHT: 225 LBS | BODY MASS INDEX: 35.31 KG/M2 | HEART RATE: 92 BPM | RESPIRATION RATE: 12 BRPM

## 2018-11-05 DIAGNOSIS — Z23 ENCOUNTER FOR IMMUNIZATION: Primary | ICD-10-CM

## 2018-11-05 DIAGNOSIS — Z12.31 ENCOUNTER FOR SCREENING MAMMOGRAM FOR BREAST CANCER: ICD-10-CM

## 2018-11-05 DIAGNOSIS — E55.9 VITAMIN D DEFICIENCY: ICD-10-CM

## 2018-11-05 DIAGNOSIS — E03.4 HYPOTHYROIDISM DUE TO ACQUIRED ATROPHY OF THYROID: ICD-10-CM

## 2018-11-05 DIAGNOSIS — M25.561 CHRONIC PAIN OF BOTH KNEES: ICD-10-CM

## 2018-11-05 DIAGNOSIS — G89.29 CHRONIC PAIN OF BOTH KNEES: ICD-10-CM

## 2018-11-05 DIAGNOSIS — M85.80 OSTEOPENIA, UNSPECIFIED LOCATION: ICD-10-CM

## 2018-11-05 DIAGNOSIS — E66.01 MORBID OBESITY (HCC): ICD-10-CM

## 2018-11-05 DIAGNOSIS — F33.41 RECURRENT MAJOR DEPRESSIVE DISORDER, IN PARTIAL REMISSION (HCC): ICD-10-CM

## 2018-11-05 DIAGNOSIS — M25.562 CHRONIC PAIN OF BOTH KNEES: ICD-10-CM

## 2018-11-05 DIAGNOSIS — E66.01 MORBID OBESITY, UNSPECIFIED OBESITY TYPE (HCC): ICD-10-CM

## 2018-11-05 DIAGNOSIS — Z00.00 HEALTHCARE MAINTENANCE: ICD-10-CM

## 2018-11-05 PROBLEM — Z86.19: Status: RESOLVED | Noted: 2017-11-27 | Resolved: 2018-11-05

## 2018-11-05 PROCEDURE — 99214 OFFICE O/P EST MOD 30 MIN: CPT | Performed by: GENERAL PRACTICE

## 2018-11-05 PROCEDURE — G0009 ADMIN PNEUMOCOCCAL VACCINE: HCPCS | Performed by: GENERAL PRACTICE

## 2018-11-05 PROCEDURE — 90686 IIV4 VACC NO PRSV 0.5 ML IM: CPT | Performed by: GENERAL PRACTICE

## 2018-11-05 PROCEDURE — G0008 ADMIN INFLUENZA VIRUS VAC: HCPCS | Performed by: GENERAL PRACTICE

## 2018-11-05 PROCEDURE — 90732 PPSV23 VACC 2 YRS+ SUBQ/IM: CPT | Performed by: GENERAL PRACTICE

## 2018-11-05 RX ORDER — PHENTERMINE HYDROCHLORIDE 37.5 MG/1
37.5 TABLET ORAL
Qty: 30 TABLET | Refills: 3 | Status: SHIPPED | OUTPATIENT
Start: 2018-11-05 | End: 2018-11-05 | Stop reason: SDUPTHER

## 2018-11-05 RX ORDER — PHENTERMINE HYDROCHLORIDE 37.5 MG/1
37.5 TABLET ORAL
Qty: 30 TABLET | Refills: 5 | Status: SHIPPED | OUTPATIENT
Start: 2018-11-05 | End: 2019-05-06 | Stop reason: SDUPTHER

## 2018-11-05 RX ORDER — TRIPROLIDINE/PSEUDOEPHEDRINE 2.5MG-60MG
TABLET ORAL
COMMUNITY
Start: 2018-09-17 | End: 2020-09-22

## 2018-11-05 NOTE — PROGRESS NOTES
Subjective   Tennille Gasca is a 73 y.o. female.     History of Present Illness     Depression  Onset was a number of years ago. She has done well since last here.  Current symptoms include: nervousness and worrying. Patient denies depressed mood, anhedonia, difficulty concentrating, impaired memory, recurrent thoughts of death and suicidal thoughts.  With melatonin she continues to average 7-8 hours of sleep nightly.    Hypothyroidism  Patient returns for evaluation of thyroid function. Symptoms consist of fatigue, weight gain. The symptoms are mild.  The problem has been unchanged.  Previous thyroid studies include TSH. The hypothyroidism is due to Hashimoto's disease. She is currently prescribed levothyroxine.  She has had no recent labs    Knee Pain  She continues to have intermittent knee pain as previously described. There has been no change in the quality nor any new associated symptoms.  Current symptoms include pain located anteriorly and stiffness. Pain is aggravated by rising after sitting and prolonged weightbearing. Patient has had no prior knee problems. Evaluation to date: none. Treatment to date: none.    The following portions of the patient's history were reviewed and updated as appropriate: allergies, current medications, past medical history, past social history and problem list.    Review of Systems   Constitutional: Positive for fatigue. Negative for appetite change, chills, fever and unexpected weight change.   HENT: Negative for congestion, ear pain, rhinorrhea, sneezing, sore throat and voice change.    Eyes: Negative for visual disturbance.   Respiratory: Negative for cough, shortness of breath and wheezing.    Cardiovascular: Negative for chest pain, palpitations and leg swelling.   Gastrointestinal: Negative for abdominal pain, blood in stool, constipation, diarrhea, nausea and vomiting.   Endocrine: Negative for polydipsia.   Genitourinary: Negative for difficulty urinating, dysuria,  frequency, hematuria, menstrual problem, pelvic pain, urgency, vaginal bleeding and vaginal discharge.   Musculoskeletal: Positive for arthralgias. Negative for back pain, joint swelling, myalgias and neck pain.   Skin: Negative for color change.   Neurological: Negative for tremors, weakness, numbness and headaches.   Psychiatric/Behavioral: Negative for dysphoric mood, sleep disturbance and suicidal ideas. The patient is nervous/anxious.      Objective   Physical Exam   Constitutional: She is oriented to person, place, and time. No distress.   Alert and in fair spirits. No apparent distress. No pallor, jaundice, diaphoresis, or cyanosis   HENT:   Head: Atraumatic.   Right Ear: Tympanic membrane, external ear and ear canal normal.   Left Ear: Tympanic membrane, external ear and ear canal normal.   Nose: Nose normal.   Mouth/Throat: Oropharynx is clear and moist. Mucous membranes are not pale and not cyanotic.   Eyes: Pupils are equal, round, and reactive to light. EOM are normal. No scleral icterus.   Neck: No JVD present. Carotid bruit is not present. No tracheal deviation present. No thyromegaly present.   Cardiovascular: Normal rate, regular rhythm, S1 normal, S2 normal and intact distal pulses.  Exam reveals no gallop, no S3 and no S4.    No murmur heard.  Pulmonary/Chest: Breath sounds normal. No stridor. No respiratory distress.   Abdominal: Soft. Normal aorta and bowel sounds are normal. She exhibits no distension, no abdominal bruit and no mass. There is no hepatosplenomegaly. There is no tenderness. No hernia.   Musculoskeletal: She exhibits no deformity.     Vascular Status -  Her right foot exhibits no edema. Her left foot exhibits no edema.  Lymphadenopathy:        Head (right side): No submandibular adenopathy present.        Head (left side): No submandibular adenopathy present.     She has no cervical adenopathy.   Neurological: She is alert and oriented to person, place, and time. No cranial nerve  deficit.   Skin: Skin is warm and dry. No rash noted. She is not diaphoretic. No cyanosis. No pallor. Nails show no clubbing.   Psychiatric: She has a normal mood and affect.     Assessment/Plan   Problems Addressed this Visit        Digestive    Vitamin D deficiency  Continue maintenance supplementation with monitoring.    Morbid obesity (CMS/HCC)  Encouraged to continue to work on her diet and exercise plan.  Continue current medication    Relevant Medications    phentermine (ADIPEX-P) 37.5 MG tablet       Endocrine    Hypothyroidism due to acquired atrophy of thyroid  Clinically euthyroid.  Continue current medication.  Updated labs will be drawn at her return        Musculoskeletal and Integument    Osteopenia  Encouraged to continue to pursue weight bearing activities while exercising joint protection.    Bilateral knee pain  Reviewed options going forward.  Patient uninterested in pursuing anything further but will report if any worse or if any new symptoms prior to her return         Other    Recurrent major depressive disorder, in partial remission (CMS/HCC)  Significant situational component.   Supportive therapy.     Healthcare maintenance  Recommended a flu shot and an updated pneumovax 23   Encouraged to follow up with shingrix but to wait at least one month from today   Will arrange an updated mammogram    Relevant Medications   Zoster Vac Recomb Adjuvanted (SHINGRIX) 50 MCG/0.5ML reconstituted suspension   Other Relevant Orders   Fluarix/Fluzone/Afluria Quad>6 Months (Completed)   Pneumococcal Polysaccharide Vaccine 23-Valent Greater Than or Equal To 1yo Subcutaneous / IM (Completed)   Mammo Screening Digital Tomosynthesis Bilateral With CAD   Encounter for screening mammogram for breast cancer   Relevant Orders   Mammo Screening Digital Tomosynthesis Bilateral With CAD   Encounter for immunization - Primary   Relevant Orders   Fluarix/Fluzone/Afluria Quad>6 Months (Completed)   Pneumococcal  Polysaccharide Vaccine 23-Valent Greater Than or Equal To 3yo Subcutaneous / IM (Completed)                                                                     Other Visit Diagnoses     Morbid obesity, unspecified obesity type (CMS/HCC)        Relevant Medications    phentermine (ADIPEX-P) 37.5 MG tablet

## 2018-12-01 DIAGNOSIS — E03.4 HYPOTHYROIDISM DUE TO ACQUIRED ATROPHY OF THYROID: ICD-10-CM

## 2018-12-03 DIAGNOSIS — E03.4 HYPOTHYROIDISM DUE TO ACQUIRED ATROPHY OF THYROID: ICD-10-CM

## 2018-12-03 RX ORDER — LEVOTHYROXINE SODIUM 112 MCG
112 TABLET ORAL DAILY
Qty: 30 TABLET | Refills: 5 | Status: SHIPPED | OUTPATIENT
Start: 2018-12-03 | End: 2019-05-06 | Stop reason: SDUPTHER

## 2018-12-03 RX ORDER — LEVOTHYROXINE SODIUM 112 MCG
TABLET ORAL
Qty: 30 TABLET | Refills: 3 | Status: SHIPPED | OUTPATIENT
Start: 2018-12-03 | End: 2019-05-06

## 2019-03-20 DIAGNOSIS — N95.1 MENOPAUSAL SYMPTOM: ICD-10-CM

## 2019-03-20 RX ORDER — ESTRADIOL 0.1 MG/G
1 CREAM VAGINAL 3 TIMES WEEKLY
Qty: 42.5 G | Refills: 5 | Status: SHIPPED | OUTPATIENT
Start: 2019-03-20 | End: 2019-05-06 | Stop reason: SDUPTHER

## 2019-03-21 ENCOUNTER — PRIOR AUTHORIZATION (OUTPATIENT)
Dept: FAMILY MEDICINE CLINIC | Facility: CLINIC | Age: 74
End: 2019-03-21

## 2019-03-25 ENCOUNTER — OFFICE VISIT (OUTPATIENT)
Dept: FAMILY MEDICINE CLINIC | Facility: CLINIC | Age: 74
End: 2019-03-25

## 2019-03-25 VITALS
SYSTOLIC BLOOD PRESSURE: 120 MMHG | TEMPERATURE: 98.1 F | DIASTOLIC BLOOD PRESSURE: 70 MMHG | OXYGEN SATURATION: 97 % | BODY MASS INDEX: 34.66 KG/M2 | RESPIRATION RATE: 12 BRPM | WEIGHT: 218 LBS | HEART RATE: 92 BPM

## 2019-03-25 DIAGNOSIS — A08.4 VIRAL GASTROENTERITIS: ICD-10-CM

## 2019-03-25 DIAGNOSIS — Z00.00 HEALTHCARE MAINTENANCE: ICD-10-CM

## 2019-03-25 DIAGNOSIS — Z23 ENCOUNTER FOR IMMUNIZATION: ICD-10-CM

## 2019-03-25 DIAGNOSIS — K80.20 ASYMPTOMATIC CHOLELITHIASIS: ICD-10-CM

## 2019-03-25 DIAGNOSIS — R82.71 ASYMPTOMATIC BACTERIURIA: ICD-10-CM

## 2019-03-25 DIAGNOSIS — R30.0 DYSURIA: Primary | ICD-10-CM

## 2019-03-25 LAB
BILIRUB BLD-MCNC: NEGATIVE MG/DL
CLARITY, POC: CLEAR
COLOR UR: NORMAL
GLUCOSE UR STRIP-MCNC: NEGATIVE MG/DL
KETONES UR QL: NEGATIVE
LEUKOCYTE EST, POC: NEGATIVE
NITRITE UR-MCNC: NEGATIVE MG/ML
PH UR: 5 [PH] (ref 5–8)
PROT UR STRIP-MCNC: NEGATIVE MG/DL
RBC # UR STRIP: NEGATIVE /UL
SP GR UR: 1.03 (ref 1–1.03)
UROBILINOGEN UR QL: NORMAL

## 2019-03-25 PROCEDURE — 81003 URINALYSIS AUTO W/O SCOPE: CPT | Performed by: GENERAL PRACTICE

## 2019-03-25 PROCEDURE — 90632 HEPA VACCINE ADULT IM: CPT | Performed by: GENERAL PRACTICE

## 2019-03-25 PROCEDURE — 99213 OFFICE O/P EST LOW 20 MIN: CPT | Performed by: GENERAL PRACTICE

## 2019-03-25 PROCEDURE — 90471 IMMUNIZATION ADMIN: CPT | Performed by: GENERAL PRACTICE

## 2019-03-25 NOTE — PROGRESS NOTES
Subjective   Tennille Gasca is a 74 y.o. female.     History of Present Illness     Vomiting and Diarrhea  Seen at [facility] on 3/17/2019 following an abrupt onset of nausea and vomiting.  Urine dipped to positive for leukocyte Estrace and microscopy confirmed 11-20 white blood cells and 1+ bacteria per high-power field.  Noncontrast CT of the abdomen and pelvis was reported as showing cholelithiasis and a 2.8 x 2.9 cm simple hepatic cyst.  CBC, CMP, amylase and lipase were unremarkable.  She was discharged on a one-week course of ciprofloxacin 500 twice daily.  By the next day she developed diarrhea but her symptoms and resolved entirely within several more days.  She denies having had any significant abdominal pain nor any hematemesis, hematochezia, or melena.  There is no history of any frequency, urgency, dysuria, or hematuria nor any fever or chills.  She feels well at present.  Her  developed similar symptoms several days after her and has also improved    The following portions of the patient's history were reviewed and updated as appropriate: allergies, current medications, past medical history, past social history and problem list.    Review of Systems   Constitutional: Positive for fatigue. Negative for appetite change, chills, fever and unexpected weight change.   HENT: Negative for congestion, ear pain, rhinorrhea, sneezing, sore throat and voice change.    Eyes: Negative for visual disturbance.   Respiratory: Negative for cough, shortness of breath and wheezing.    Cardiovascular: Negative for chest pain, palpitations and leg swelling.   Gastrointestinal: Negative for abdominal pain, blood in stool, constipation, diarrhea, nausea and vomiting.   Endocrine: Negative for polydipsia.   Genitourinary: Negative for difficulty urinating, dysuria, frequency, hematuria, menstrual problem, pelvic pain, urgency, vaginal bleeding and vaginal discharge.   Musculoskeletal: Positive for arthralgias. Negative for  back pain, joint swelling, myalgias and neck pain.   Skin: Negative for color change.   Neurological: Negative for tremors, weakness, numbness and headaches.   Psychiatric/Behavioral: Negative for dysphoric mood, sleep disturbance and suicidal ideas. The patient is nervous/anxious.      Objective   Physical Exam   Constitutional: She is oriented to person, place, and time. No distress.   Alert and in fair spirits. No apparent distress. No pallor, jaundice, diaphoresis, or cyanosis   HENT:   Head: Atraumatic.   Right Ear: Tympanic membrane, external ear and ear canal normal.   Left Ear: Tympanic membrane, external ear and ear canal normal.   Nose: Nose normal.   Mouth/Throat: Oropharynx is clear and moist. Mucous membranes are not pale and not cyanotic.   Eyes: EOM are normal. Pupils are equal, round, and reactive to light. No scleral icterus.   Neck: No JVD present. Carotid bruit is not present. No tracheal deviation present. No thyromegaly present.   Cardiovascular: Normal rate, regular rhythm, S1 normal, S2 normal and intact distal pulses. Exam reveals no gallop, no S3 and no S4.   No murmur heard.  Pulmonary/Chest: Breath sounds normal. No stridor. No respiratory distress.   Abdominal: Soft. Normal aorta and bowel sounds are normal. She exhibits no distension, no abdominal bruit and no mass. There is no hepatosplenomegaly. There is no tenderness. No hernia.   Musculoskeletal: She exhibits no deformity.     Vascular Status -  Her right foot exhibits no edema. Her left foot exhibits no edema.  Lymphadenopathy:        Head (right side): No submandibular adenopathy present.        Head (left side): No submandibular adenopathy present.     She has no cervical adenopathy.   Neurological: She is alert and oriented to person, place, and time. No cranial nerve deficit.   Skin: Skin is warm and dry. No rash noted. She is not diaphoretic. No cyanosis. No pallor. Nails show no clubbing.   Psychiatric: She has a normal mood  and affect.     Assessment/Plan   Problems Addressed this Visit        Digestive    Asymptomatic cholelithiasis  Advised regarding her CT findings  Reviewed potential symptoms of cholelithiasis and encouraged to report if she should experience any    Viral gastroenteritis  Resolved       Other    Healthcare maintenance  Reviewed the potential benefits and risks of hepatitis A immunizations. Patient wished to proceed with this and dose # 1 administered. Patient is aware that a second dose is required in 6 months.  We will reschedule her mammogram    Relevant Orders    Hepatitis A Vaccine Adult IM (Completed)    Encounter for immunization    Relevant Orders    Hepatitis A Vaccine Adult IM (Completed)    Asymptomatic bacteriuria  Urinalysis rechecked    Relevant Orders    POCT urinalysis dipstick, automated (Completed)

## 2019-03-26 ENCOUNTER — TELEPHONE (OUTPATIENT)
Dept: FAMILY MEDICINE CLINIC | Facility: CLINIC | Age: 74
End: 2019-03-26

## 2019-03-26 PROBLEM — N95.2 ATROPHIC VAGINITIS: Status: ACTIVE | Noted: 2019-03-26

## 2019-03-26 NOTE — TELEPHONE ENCOUNTER
Resent referral to Scheduling.     ----- Message from Alfonso Rubi MD sent at 3/25/2019  5:56 PM EDT -----  Please reschedule mammogram at [facility]

## 2019-05-01 ENCOUNTER — APPOINTMENT (OUTPATIENT)
Dept: MAMMOGRAPHY | Facility: HOSPITAL | Age: 74
End: 2019-05-01

## 2019-05-06 ENCOUNTER — OFFICE VISIT (OUTPATIENT)
Dept: FAMILY MEDICINE CLINIC | Facility: CLINIC | Age: 74
End: 2019-05-06

## 2019-05-06 ENCOUNTER — RESULTS ENCOUNTER (OUTPATIENT)
Dept: FAMILY MEDICINE CLINIC | Facility: CLINIC | Age: 74
End: 2019-05-06

## 2019-05-06 VITALS
OXYGEN SATURATION: 91 % | BODY MASS INDEX: 33.59 KG/M2 | TEMPERATURE: 98.9 F | HEIGHT: 67 IN | DIASTOLIC BLOOD PRESSURE: 80 MMHG | RESPIRATION RATE: 12 BRPM | WEIGHT: 214 LBS | HEART RATE: 91 BPM | SYSTOLIC BLOOD PRESSURE: 135 MMHG

## 2019-05-06 DIAGNOSIS — Z12.11 ENCOUNTER FOR SCREENING FOR MALIGNANT NEOPLASM OF COLON: ICD-10-CM

## 2019-05-06 DIAGNOSIS — R30.0 DYSURIA: Primary | ICD-10-CM

## 2019-05-06 DIAGNOSIS — F33.41 RECURRENT MAJOR DEPRESSIVE DISORDER, IN PARTIAL REMISSION (HCC): ICD-10-CM

## 2019-05-06 DIAGNOSIS — E03.4 HYPOTHYROIDISM DUE TO ACQUIRED ATROPHY OF THYROID: ICD-10-CM

## 2019-05-06 DIAGNOSIS — N95.1 MENOPAUSAL SYMPTOM: ICD-10-CM

## 2019-05-06 DIAGNOSIS — M25.562 CHRONIC PAIN OF BOTH KNEES: ICD-10-CM

## 2019-05-06 DIAGNOSIS — Z00.00 HEALTHCARE MAINTENANCE: ICD-10-CM

## 2019-05-06 DIAGNOSIS — M85.80 OSTEOPENIA, UNSPECIFIED LOCATION: ICD-10-CM

## 2019-05-06 DIAGNOSIS — M25.561 CHRONIC PAIN OF BOTH KNEES: ICD-10-CM

## 2019-05-06 DIAGNOSIS — E66.01 MORBID OBESITY (HCC): ICD-10-CM

## 2019-05-06 DIAGNOSIS — E66.01 MORBID OBESITY, UNSPECIFIED OBESITY TYPE (HCC): ICD-10-CM

## 2019-05-06 DIAGNOSIS — R53.83 OTHER FATIGUE: ICD-10-CM

## 2019-05-06 DIAGNOSIS — E55.9 VITAMIN D DEFICIENCY: ICD-10-CM

## 2019-05-06 DIAGNOSIS — G89.29 CHRONIC PAIN OF BOTH KNEES: ICD-10-CM

## 2019-05-06 PROBLEM — A08.4 VIRAL GASTROENTERITIS: Status: RESOLVED | Noted: 2019-03-25 | Resolved: 2019-05-06

## 2019-05-06 PROBLEM — R82.71 ASYMPTOMATIC BACTERIURIA: Status: RESOLVED | Noted: 2019-03-25 | Resolved: 2019-05-06

## 2019-05-06 LAB
ALBUMIN SERPL-MCNC: 4.3 G/DL (ref 3.5–5.2)
ALBUMIN/GLOB SERPL: 1.3 G/DL
ALP SERPL-CCNC: 92 U/L (ref 39–117)
ALT SERPL W P-5'-P-CCNC: 11 U/L (ref 1–33)
ANION GAP SERPL CALCULATED.3IONS-SCNC: 10.7 MMOL/L
AST SERPL-CCNC: 20 U/L (ref 1–32)
BASOPHILS # BLD AUTO: 0.11 10*3/MM3 (ref 0–0.2)
BASOPHILS NFR BLD AUTO: 1.6 % (ref 0–1.5)
BILIRUB BLD-MCNC: NEGATIVE MG/DL
BILIRUB SERPL-MCNC: 0.4 MG/DL (ref 0.2–1.2)
BUN BLD-MCNC: 18 MG/DL (ref 8–23)
BUN/CREAT SERPL: 22.5 (ref 7–25)
CALCIUM SPEC-SCNC: 9.6 MG/DL (ref 8.6–10.5)
CHLORIDE SERPL-SCNC: 95 MMOL/L (ref 98–107)
CHOLEST SERPL-MCNC: 181 MG/DL (ref 0–200)
CLARITY, POC: CLEAR
CO2 SERPL-SCNC: 25.3 MMOL/L (ref 22–29)
COLOR UR: YELLOW
CREAT BLD-MCNC: 0.8 MG/DL (ref 0.57–1)
DEPRECATED RDW RBC AUTO: 46 FL (ref 37–54)
EOSINOPHIL # BLD AUTO: 0.29 10*3/MM3 (ref 0–0.4)
EOSINOPHIL NFR BLD AUTO: 4.2 % (ref 0.3–6.2)
ERYTHROCYTE [DISTWIDTH] IN BLOOD BY AUTOMATED COUNT: 13.2 % (ref 12.3–15.4)
GFR SERPL CREATININE-BSD FRML MDRD: 71 ML/MIN/1.73
GLOBULIN UR ELPH-MCNC: 3.2 GM/DL
GLUCOSE BLD-MCNC: 90 MG/DL (ref 65–99)
GLUCOSE UR STRIP-MCNC: NEGATIVE MG/DL
HCT VFR BLD AUTO: 40.6 % (ref 34–46.6)
HDLC SERPL-MCNC: 54 MG/DL (ref 40–60)
HGB BLD-MCNC: 13 G/DL (ref 12–15.9)
IMM GRANULOCYTES # BLD AUTO: 0.01 10*3/MM3 (ref 0–0.05)
IMM GRANULOCYTES NFR BLD AUTO: 0.1 % (ref 0–0.5)
KETONES UR QL: NEGATIVE
LDLC SERPL CALC-MCNC: 115 MG/DL (ref 0–100)
LDLC/HDLC SERPL: 2.14 {RATIO}
LEUKOCYTE EST, POC: NEGATIVE
LYMPHOCYTES # BLD AUTO: 1.33 10*3/MM3 (ref 0.7–3.1)
LYMPHOCYTES NFR BLD AUTO: 19.3 % (ref 19.6–45.3)
MCH RBC QN AUTO: 30.3 PG (ref 26.6–33)
MCHC RBC AUTO-ENTMCNC: 32 G/DL (ref 31.5–35.7)
MCV RBC AUTO: 94.6 FL (ref 79–97)
MONOCYTES # BLD AUTO: 0.47 10*3/MM3 (ref 0.1–0.9)
MONOCYTES NFR BLD AUTO: 6.8 % (ref 5–12)
NEUTROPHILS # BLD AUTO: 4.69 10*3/MM3 (ref 1.7–7)
NEUTROPHILS NFR BLD AUTO: 68 % (ref 42.7–76)
NITRITE UR-MCNC: NEGATIVE MG/ML
NRBC BLD AUTO-RTO: 0 /100 WBC (ref 0–0.2)
PH UR: 5 [PH] (ref 5–8)
PLATELET # BLD AUTO: 378 10*3/MM3 (ref 140–450)
PMV BLD AUTO: 10.9 FL (ref 6–12)
POTASSIUM BLD-SCNC: 4.4 MMOL/L (ref 3.5–5.2)
PROT SERPL-MCNC: 7.5 G/DL (ref 6–8.5)
PROT UR STRIP-MCNC: NEGATIVE MG/DL
RBC # BLD AUTO: 4.29 10*6/MM3 (ref 3.77–5.28)
RBC # UR STRIP: NEGATIVE /UL
SODIUM BLD-SCNC: 131 MMOL/L (ref 136–145)
SP GR UR: 1.02 (ref 1–1.03)
TRIGL SERPL-MCNC: 58 MG/DL (ref 0–150)
TSH SERPL DL<=0.05 MIU/L-ACNC: 4.59 MIU/ML (ref 0.27–4.2)
UROBILINOGEN UR QL: NORMAL
VLDLC SERPL-MCNC: 11.6 MG/DL (ref 5–40)
WBC NRBC COR # BLD: 6.9 10*3/MM3 (ref 3.4–10.8)

## 2019-05-06 PROCEDURE — 85025 COMPLETE CBC W/AUTO DIFF WBC: CPT | Performed by: GENERAL PRACTICE

## 2019-05-06 PROCEDURE — 80061 LIPID PANEL: CPT | Performed by: GENERAL PRACTICE

## 2019-05-06 PROCEDURE — 80053 COMPREHEN METABOLIC PANEL: CPT | Performed by: GENERAL PRACTICE

## 2019-05-06 PROCEDURE — 81003 URINALYSIS AUTO W/O SCOPE: CPT | Performed by: GENERAL PRACTICE

## 2019-05-06 PROCEDURE — 84443 ASSAY THYROID STIM HORMONE: CPT | Performed by: GENERAL PRACTICE

## 2019-05-06 PROCEDURE — 99214 OFFICE O/P EST MOD 30 MIN: CPT | Performed by: GENERAL PRACTICE

## 2019-05-06 PROCEDURE — 82306 VITAMIN D 25 HYDROXY: CPT | Performed by: GENERAL PRACTICE

## 2019-05-06 RX ORDER — ESTRADIOL 0.1 MG/G
1 CREAM VAGINAL 3 TIMES WEEKLY
Qty: 42.5 G | Refills: 5 | Status: SHIPPED | OUTPATIENT
Start: 2019-05-06 | End: 2019-11-04 | Stop reason: SDUPTHER

## 2019-05-06 RX ORDER — LEVOTHYROXINE SODIUM 112 MCG
112 TABLET ORAL DAILY
Qty: 30 TABLET | Refills: 5 | Status: SHIPPED | OUTPATIENT
Start: 2019-05-06 | End: 2019-11-04 | Stop reason: SDUPTHER

## 2019-05-06 RX ORDER — PHENTERMINE HYDROCHLORIDE 37.5 MG/1
37.5 TABLET ORAL
Qty: 30 TABLET | Refills: 5 | Status: SHIPPED | OUTPATIENT
Start: 2019-05-06 | End: 2019-11-04 | Stop reason: SDUPTHER

## 2019-05-06 NOTE — PROGRESS NOTES
Subjective   Tennille Gasca is a 74 y.o. female.     History of Present Illness     Fatigue  Returns with a 3 to 4 week history of increased fatigue.  She and her  have moved to another house and had been quite busy.  She has been sleeping well and denies any changes in her appetite or exercise tolerance.  There is no history of any chest pain, palpitations, lightheadedness, shortness of breath, or some of the ankles and she denies any changes in her vision, strength, or sensation.  There is no history of any snoring or waking with the sense that she cannot catch her breath.  There is no history of any fever, chills, night sweats or weight loss.    Depression  Onset was a number of years ago. She has done well since last here.  Current symptoms include: intermittent nervousness and worrying. Patient denies depressed mood, anhedonia, difficulty concentrating, impaired memory, recurrent thoughts of death or suicidal thoughts.  With melatonin she continues to average 7-8 hours of sleep nightly.    Hypothyroidism  Patient returns for evaluation of thyroid function. Symptoms consist of fatigue, weight gain. The symptoms are mild. Previous thyroid studies include TSH. The hypothyroidism is due to Hashimoto's disease. She is currently prescribed levothyroxine.  She has had no recent labs    Knee Pain  She continues to have intermittent knee pain as previously described. There has been no change in the quality nor any new associated symptoms.  Current symptoms include pain located anteriorly and stiffness. Pain is aggravated by rising after sitting and prolonged weightbearing. Patient has had no prior knee problems. Evaluation to date: none. Treatment to date: none.    The following portions of the patient's history were reviewed and updated as appropriate: allergies, current medications, past medical history, past social history and problem list.    Review of Systems   Constitutional: Positive for fatigue. Negative  for appetite change, chills, fever and unexpected weight change.   HENT: Negative for congestion, ear pain, rhinorrhea, sneezing, sore throat and voice change.    Eyes: Negative for visual disturbance.   Respiratory: Negative for cough, shortness of breath and wheezing.    Cardiovascular: Negative for chest pain, palpitations and leg swelling.   Gastrointestinal: Negative for abdominal pain, blood in stool, constipation, diarrhea, nausea and vomiting.   Endocrine: Negative for polydipsia.   Genitourinary: Negative for difficulty urinating, dysuria, frequency, hematuria, menstrual problem, pelvic pain, urgency, vaginal bleeding and vaginal discharge.   Musculoskeletal: Positive for arthralgias. Negative for back pain, joint swelling, myalgias and neck pain.   Skin: Negative for color change.   Neurological: Negative for tremors, weakness, numbness and headaches.   Psychiatric/Behavioral: Negative for dysphoric mood, sleep disturbance and suicidal ideas. The patient is nervous/anxious.      Objective   Physical Exam   Constitutional: She is oriented to person, place, and time. No distress.   Bright and in good spirits. No apparent distress. No pallor, jaundice, diaphoresis, or cyanosis.   HENT:   Head: Atraumatic.   Right Ear: Tympanic membrane, external ear and ear canal normal.   Left Ear: Tympanic membrane, external ear and ear canal normal.   Nose: Nose normal.   Mouth/Throat: Oropharynx is clear and moist. Mucous membranes are not pale and not cyanotic.   Eyes: EOM are normal. Pupils are equal, round, and reactive to light. No scleral icterus.   Neck: No JVD present. Carotid bruit is not present. No tracheal deviation present. No thyromegaly present.   Cardiovascular: Normal rate, regular rhythm, S1 normal, S2 normal and intact distal pulses. Exam reveals no gallop, no S3 and no S4.   No murmur heard.  Pulmonary/Chest: Breath sounds normal. No stridor. No respiratory distress.   Abdominal: Soft. Normal aorta and  bowel sounds are normal. She exhibits no distension, no abdominal bruit and no mass. There is no hepatosplenomegaly. There is no tenderness. No hernia.   Musculoskeletal: She exhibits no deformity.     Vascular Status -  Her right foot exhibits no edema. Her left foot exhibits no edema.  Lymphadenopathy:        Head (right side): No submandibular adenopathy present.        Head (left side): No submandibular adenopathy present.     She has no cervical adenopathy.   Neurological: She is alert and oriented to person, place, and time. No cranial nerve deficit.   Skin: Skin is warm and dry. No rash noted. She is not diaphoretic. No cyanosis. No pallor. Nails show no clubbing.   Psychiatric: She has a normal mood and affect.     Assessment/Plan   Problems Addressed this Visit        Digestive    Vitamin D deficiency  Continue maintenance supplementation with monitoring.  Updated labs drawn.    Relevant Orders    Vitamin D 25 Hydroxy    Morbid obesity (CMS/HCC)  Encouraged to continue to work on her diet and exercise plan.  Continue current medication    Relevant Medications    phentermine (ADIPEX-P) 37.5 MG tablet       Endocrine    Hypothyroidism due to acquired atrophy of thyroid  Clinically euthyroid.  Continue current medication.    Relevant Medications    SYNTHROID 112 MCG tablet    Other Relevant Orders    TSH       Musculoskeletal and Integument    Osteopenia    Bilateral knee pain  Reminded regarding symptomatic treatment.        Genitourinary    Menopausal symptom    Relevant Medications    estradiol (ESTRACE) 0.1 MG/GM vaginal cream       Other    Recurrent major depressive disorder, in partial remission (CMS/HCC)    Stable.  Supportive therapy.   Continue current medication.        Healthcare maintenance  Patient uninterested in an updated colonoscopy but is willing to pursue a cologuard and this will be arranged  Reminded to follow-up with her mammogram and shingrix    Relevant Orders    CBC & Differential     Comprehensive Metabolic Panel    Lipid Panel    Cologuard - Stool, Per Rectum    CBC Auto Differential    Other fatigue  Likely multifactorial  Encouraged to report if any worse, any new symptoms, or if not improving as she and her  get settled in their new home

## 2019-05-07 LAB — 25(OH)D3 SERPL-MCNC: 72.5 NG/ML (ref 30–100)

## 2019-05-24 DIAGNOSIS — R19.5 POSITIVE COLORECTAL CANCER SCREENING USING COLOGUARD TEST: Primary | ICD-10-CM

## 2019-06-17 ENCOUNTER — OFFICE VISIT (OUTPATIENT)
Dept: SURGERY | Facility: CLINIC | Age: 74
End: 2019-06-17

## 2019-06-17 ENCOUNTER — TELEPHONE (OUTPATIENT)
Dept: SURGERY | Facility: CLINIC | Age: 74
End: 2019-06-17

## 2019-06-17 VITALS
SYSTOLIC BLOOD PRESSURE: 194 MMHG | WEIGHT: 214.8 LBS | DIASTOLIC BLOOD PRESSURE: 108 MMHG | BODY MASS INDEX: 33.71 KG/M2 | HEIGHT: 67 IN

## 2019-06-17 DIAGNOSIS — R19.5 POSITIVE COLORECTAL CANCER SCREENING USING COLOGUARD TEST: Primary | ICD-10-CM

## 2019-06-17 PROCEDURE — 99203 OFFICE O/P NEW LOW 30 MIN: CPT | Performed by: SURGERY

## 2019-06-17 NOTE — TELEPHONE ENCOUNTER
I called her a suprep bowel prep  in to [facility] patient was already there to .

## 2019-06-19 ENCOUNTER — TELEPHONE (OUTPATIENT)
Dept: SURGERY | Facility: CLINIC | Age: 74
End: 2019-06-19

## 2019-06-19 NOTE — H&P (VIEW-ONLY)
Subjective   Tennille Gasca is a 74 y.o. female here as consultation from Alfonso Rubi MD    74 y.o. female here for positive Cologuard. There is no family history of colon neoplasia. No family hx of gastric, ovarian, or uterine cancer.colonoscopy 10 years ago With benign polyps as the only abnormalities..  Patient is not on anticoagulation.  Patient denies weight loss.  No change in bowel habits or blood in stools reported.    The following portions of the patient's history were reviewed and updated as appropriate: allergies, current medications, past family history, past medical history, past social history, past surgical history and problem list.    Past Medical History:   Diagnosis Date   • Depression    • Hypothyroidism due to acquired atrophy of thyroid    • Osteopenia        Family History   Problem Relation Age of Onset   • Heart disease Mother    • Heart disease Father    • Cancer Father        Social History     Socioeconomic History   • Marital status:      Spouse name: shukri   • Number of children: 1   • Years of education: 12   • Highest education level: Not on file   Occupational History   • Occupation: retired   Tobacco Use   • Smoking status: Never Smoker   • Smokeless tobacco: Never Used   Substance and Sexual Activity   • Alcohol use: No   • Drug use: No   • Sexual activity: Defer       Past Surgical History:   Procedure Laterality Date   • TUBAL ABDOMINAL LIGATION           Review of Systems   Constitutional: Negative for activity change, appetite change, chills and fever.   HENT: Negative for sore throat and trouble swallowing.    Eyes: Negative for visual disturbance.   Respiratory: Negative for cough and shortness of breath.    Cardiovascular: Negative for chest pain and palpitations.   Gastrointestinal: Negative for abdominal distention, abdominal pain, blood in stool, constipation, diarrhea, nausea and vomiting.   Endocrine: Negative for cold intolerance and heat  "intolerance.   Genitourinary: Negative for dysuria.   Musculoskeletal: Negative for joint swelling.   Skin: Negative for color change, rash and wound.   Allergic/Immunologic: Negative for immunocompromised state.   Neurological: Negative for dizziness, seizures, weakness and headaches.   Hematological: Negative for adenopathy. Does not bruise/bleed easily.   Psychiatric/Behavioral: Negative for agitation and confusion.         BP (!) 194/108   Ht 168.9 cm (66.5\")   Wt 97.4 kg (214 lb 12.8 oz)   BMI 34.15 kg/m²   Objective   Physical Exam   Constitutional: She is oriented to person, place, and time. She appears well-developed.   HENT:   Head: Normocephalic and atraumatic.   Mouth/Throat: Mucous membranes are normal.   Eyes: Conjunctivae are normal. Pupils are equal, round, and reactive to light.   Neck: Neck supple. No JVD present. No tracheal deviation present. No thyromegaly present.   Cardiovascular: Normal rate and regular rhythm. Exam reveals no gallop and no friction rub.   No murmur heard.  Pulmonary/Chest: Effort normal and breath sounds normal.   Abdominal: Soft. She exhibits no distension. There is no splenomegaly or hepatomegaly. There is no tenderness. No hernia.   Musculoskeletal: Normal range of motion. She exhibits no deformity.   Neurological: She is alert and oriented to person, place, and time.   Skin: Skin is warm and dry.   Psychiatric: She has a normal mood and affect.         Tennille was seen today for colonoscopy consult.    Diagnoses and all orders for this visit:    Positive colorectal cancer screening using Cologuard test  -     Case Request; Standing  -     Case Request    Other orders  -     Follow anesthesia standing orders.  -     Provide NPO Instructions to Patient; Future  -     Follow anesthesia standing orders.; Standing  -     Verify NPO Status; Standing  -     Obtain informed consent; Standing  -     SCD (sequential compression device)- to be placed on patient in Pre-op; " Standing  -     Verify / Perform Chlorhexidine Skin Prep if Indicated (If Not Already Completed); Standing        Assessment     Tennille Gasca is a 74 y.o. female with positive Cologuard need for diagnostic colonoscopy.  Risks and benefits of been discussed with the patient and she is scheduled for colonoscopy.  Patient's Body mass index is 34.15 kg/m². BMI is above normal parameters. Recommendations include: educational material.

## 2019-06-19 NOTE — TELEPHONE ENCOUNTER
I called the patient to inform her that she is scheduled for a colonoscopy tomorrow 6/20/19 with Dr. Robert at 6:30. Patient was told to follow the clear liquid diet all day today, and she will drink her prep around 6 pm. She is to be NPO after midnight and have a  with her tomorrow. Patient acknowledged understanding of information. She is a Medicare patient and no PA is required for her surgery. Case request worked and dropped down. Scheduling is aware of patients appointment.    Mariam PARRA

## 2019-06-20 ENCOUNTER — ANESTHESIA (OUTPATIENT)
Dept: PERIOP | Facility: HOSPITAL | Age: 74
End: 2019-06-20

## 2019-06-20 ENCOUNTER — HOSPITAL ENCOUNTER (OUTPATIENT)
Facility: HOSPITAL | Age: 74
Setting detail: HOSPITAL OUTPATIENT SURGERY
Discharge: HOME | End: 2019-06-20
Attending: SURGERY | Admitting: SURGERY

## 2019-06-20 ENCOUNTER — ANESTHESIA EVENT (OUTPATIENT)
Dept: PERIOP | Facility: HOSPITAL | Age: 74
End: 2019-06-20

## 2019-06-20 VITALS
WEIGHT: 214 LBS | HEART RATE: 66 BPM | TEMPERATURE: 97.2 F | BODY MASS INDEX: 33.59 KG/M2 | OXYGEN SATURATION: 97 % | RESPIRATION RATE: 18 BRPM | HEIGHT: 67 IN | SYSTOLIC BLOOD PRESSURE: 166 MMHG | DIASTOLIC BLOOD PRESSURE: 79 MMHG

## 2019-06-20 PROCEDURE — 25010000002 PROPOFOL 10 MG/ML EMULSION: Performed by: NURSE ANESTHETIST, CERTIFIED REGISTERED

## 2019-06-20 PROCEDURE — 45378 DIAGNOSTIC COLONOSCOPY: CPT | Performed by: SURGERY

## 2019-06-20 PROCEDURE — 25010000002 FENTANYL CITRATE (PF) 100 MCG/2ML SOLUTION: Performed by: NURSE ANESTHETIST, CERTIFIED REGISTERED

## 2019-06-20 PROCEDURE — 25010000002 MIDAZOLAM PER 1 MG: Performed by: NURSE ANESTHETIST, CERTIFIED REGISTERED

## 2019-06-20 PROCEDURE — 25010000002 PROPOFOL 1000 MG/ML EMULSION: Performed by: NURSE ANESTHETIST, CERTIFIED REGISTERED

## 2019-06-20 RX ORDER — IPRATROPIUM BROMIDE AND ALBUTEROL SULFATE 2.5; .5 MG/3ML; MG/3ML
3 SOLUTION RESPIRATORY (INHALATION) ONCE AS NEEDED
Status: DISCONTINUED | OUTPATIENT
Start: 2019-06-20 | End: 2019-06-20 | Stop reason: HOSPADM

## 2019-06-20 RX ORDER — SODIUM CHLORIDE 0.9 % (FLUSH) 0.9 %
3-10 SYRINGE (ML) INJECTION AS NEEDED
Status: DISCONTINUED | OUTPATIENT
Start: 2019-06-20 | End: 2019-06-20 | Stop reason: HOSPADM

## 2019-06-20 RX ORDER — ONDANSETRON 2 MG/ML
4 INJECTION INTRAMUSCULAR; INTRAVENOUS ONCE AS NEEDED
Status: DISCONTINUED | OUTPATIENT
Start: 2019-06-20 | End: 2019-06-20 | Stop reason: HOSPADM

## 2019-06-20 RX ORDER — SODIUM CHLORIDE 0.9 % (FLUSH) 0.9 %
3 SYRINGE (ML) INJECTION EVERY 12 HOURS SCHEDULED
Status: DISCONTINUED | OUTPATIENT
Start: 2019-06-20 | End: 2019-06-20 | Stop reason: HOSPADM

## 2019-06-20 RX ORDER — FENTANYL CITRATE 50 UG/ML
INJECTION, SOLUTION INTRAMUSCULAR; INTRAVENOUS AS NEEDED
Status: DISCONTINUED | OUTPATIENT
Start: 2019-06-20 | End: 2019-06-20 | Stop reason: SURG

## 2019-06-20 RX ORDER — FENTANYL CITRATE 50 UG/ML
50 INJECTION, SOLUTION INTRAMUSCULAR; INTRAVENOUS
Status: DISCONTINUED | OUTPATIENT
Start: 2019-06-20 | End: 2019-06-20 | Stop reason: HOSPADM

## 2019-06-20 RX ORDER — OXYCODONE HYDROCHLORIDE AND ACETAMINOPHEN 5; 325 MG/1; MG/1
1 TABLET ORAL ONCE AS NEEDED
Status: DISCONTINUED | OUTPATIENT
Start: 2019-06-20 | End: 2019-06-20 | Stop reason: HOSPADM

## 2019-06-20 RX ORDER — MIDAZOLAM HYDROCHLORIDE 1 MG/ML
INJECTION INTRAMUSCULAR; INTRAVENOUS AS NEEDED
Status: DISCONTINUED | OUTPATIENT
Start: 2019-06-20 | End: 2019-06-20 | Stop reason: SURG

## 2019-06-20 RX ORDER — SODIUM CHLORIDE, SODIUM LACTATE, POTASSIUM CHLORIDE, CALCIUM CHLORIDE 600; 310; 30; 20 MG/100ML; MG/100ML; MG/100ML; MG/100ML
125 INJECTION, SOLUTION INTRAVENOUS CONTINUOUS
Status: DISCONTINUED | OUTPATIENT
Start: 2019-06-20 | End: 2019-06-20 | Stop reason: HOSPADM

## 2019-06-20 RX ORDER — MEPERIDINE HYDROCHLORIDE 25 MG/ML
12.5 INJECTION INTRAMUSCULAR; INTRAVENOUS; SUBCUTANEOUS
Status: DISCONTINUED | OUTPATIENT
Start: 2019-06-20 | End: 2019-06-20 | Stop reason: HOSPADM

## 2019-06-20 RX ORDER — PROPOFOL 10 MG/ML
VIAL (ML) INTRAVENOUS AS NEEDED
Status: DISCONTINUED | OUTPATIENT
Start: 2019-06-20 | End: 2019-06-20 | Stop reason: SURG

## 2019-06-20 RX ADMIN — PROPOFOL 60 MG: 10 INJECTION, EMULSION INTRAVENOUS at 07:41

## 2019-06-20 RX ADMIN — PROPOFOL 120 MCG/KG/MIN: 10 INJECTION, EMULSION INTRAVENOUS at 07:41

## 2019-06-20 RX ADMIN — SODIUM CHLORIDE, POTASSIUM CHLORIDE, SODIUM LACTATE AND CALCIUM CHLORIDE: 600; 310; 30; 20 INJECTION, SOLUTION INTRAVENOUS at 07:35

## 2019-06-20 RX ADMIN — MIDAZOLAM HYDROCHLORIDE 2 MG: 1 INJECTION, SOLUTION INTRAMUSCULAR; INTRAVENOUS at 07:35

## 2019-06-20 RX ADMIN — FENTANYL CITRATE 100 MCG: 50 INJECTION INTRAMUSCULAR; INTRAVENOUS at 07:35

## 2019-06-20 NOTE — ANESTHESIA POSTPROCEDURE EVALUATION
Patient: Tennille MORTON Elo    Procedure Summary     Date:  06/20/19 Room / Location:  [facility] OR [address] [place] [address] COR OR    Anesthesia Start:  0735 Anesthesia Stop:  0755    Procedure:  COLONOSCOPY (N/A ) Diagnosis:       Positive colorectal cancer screening using Cologuard test      (Positive colorectal cancer screening using Cologuard test [R19.5])    Surgeon:  Kee Robert MD Provider:  Maged Nicholas MD    Anesthesia Type:  general ASA Status:  3          Anesthesia Type: general  Last vitals  BP   162/84 (06/20/19 0812)   Temp   97.2 °F (36.2 °C) (06/20/19 0757)   Pulse   65 (06/20/19 0812)   Resp   18 (06/20/19 0812)     SpO2   97 % (06/20/19 0812)     Post Anesthesia Care and Evaluation    Patient location during evaluation: PHASE II  Patient participation: complete - patient participated  Level of consciousness: awake and alert  Pain score: 1  Pain management: adequate  Airway patency: patent  Anesthetic complications: No anesthetic complications  PONV Status: controlled  Cardiovascular status: acceptable  Respiratory status: acceptable  Hydration status: acceptable

## 2019-06-20 NOTE — OP NOTE
COLONOSCOPY  Procedure Note    Tennille MORTON Elo  6/20/2019    Pre-op Diagnosis:   Positive colorectal cancer screening using Cologuard test [R19.5]    Post-op Diagnosis:   Positive Cologuard, normal colonoscopy    Indications: See above    Procedure(s):  COLONOSCOPY    Surgeon(s):  Kee Robert MD    Anesthesia: Choice    Staff:   Circulator: Khushboo Chang RN  Endo Technician: Charles Copeland    Findings: Normal colon    Operative Procedure: The patient was taken to the operating suite and placed in left lateral decubitus position.  Bilateral sequential compression devices were in place and IV anesthesia was administered.  Timeout procedure was performed.  Digital rectal examination was negative.  The colonoscope was inserted and advanced to the cecum as evidenced by the ileocecal valve and appendiceal orifice.  The bowel prep was excellent.  The colonoscope was slowly removed and the entirety of the colonic mucosa was evaluated.  Colonoscopic findings included normal colon mucosa throughout.  The colonoscope was then removed and the patient was awakened from anesthesia and taken recovery.  They tolerated the procedure well.    Estimated Blood Loss: minimal    Specimens: None                  Drains: none    Grafts or Implants: none    Complications: None    Recommendations: screening colonoscopy in 10 years     Kee Robert MD     Date: 6/20/2019  Time: 8:10 AM

## 2019-06-20 NOTE — ANESTHESIA PREPROCEDURE EVALUATION
Anesthesia Evaluation     Patient summary reviewed and Nursing notes reviewed   no history of anesthetic complications:  NPO Solid Status: > 8 hours  NPO Liquid Status: > 8 hours           Airway   Mallampati: II  TM distance: >3 FB  Neck ROM: full  No difficulty expected  Dental    (+) poor dentition    Pulmonary - negative pulmonary ROS and normal exam   Cardiovascular - negative cardio ROS and normal exam        Neuro/Psych  (+) psychiatric history,     GI/Hepatic/Renal/Endo    (+) obesity,   hypothyroidism,     Musculoskeletal (-) negative ROS    Abdominal  - normal exam   Substance History - negative use     OB/GYN negative ob/gyn ROS         Other                        Anesthesia Plan    ASA 3     general     intravenous induction   Anesthetic plan, all risks, benefits, and alternatives have been provided, discussed and informed consent has been obtained with: patient.

## 2019-07-03 ENCOUNTER — OFFICE VISIT (OUTPATIENT)
Dept: FAMILY MEDICINE CLINIC | Facility: CLINIC | Age: 74
End: 2019-07-03

## 2019-07-03 VITALS
HEIGHT: 67 IN | BODY MASS INDEX: 33.27 KG/M2 | WEIGHT: 212 LBS | HEART RATE: 92 BPM | OXYGEN SATURATION: 97 % | TEMPERATURE: 97.6 F | DIASTOLIC BLOOD PRESSURE: 80 MMHG | SYSTOLIC BLOOD PRESSURE: 140 MMHG | RESPIRATION RATE: 14 BRPM

## 2019-07-03 DIAGNOSIS — N39.0 URINARY TRACT INFECTION WITHOUT HEMATURIA, SITE UNSPECIFIED: Primary | ICD-10-CM

## 2019-07-03 DIAGNOSIS — R39.9 LOWER URINARY TRACT SYMPTOMS: ICD-10-CM

## 2019-07-03 LAB
BACTERIA UR QL AUTO: ABNORMAL /HPF
BILIRUB BLD-MCNC: NEGATIVE MG/DL
BILIRUB UR QL STRIP: NEGATIVE
CLARITY UR: ABNORMAL
CLARITY, POC: ABNORMAL
COLOR UR: YELLOW
COLOR UR: YELLOW
GLUCOSE UR STRIP-MCNC: NEGATIVE MG/DL
GLUCOSE UR STRIP-MCNC: NEGATIVE MG/DL
HGB UR QL STRIP.AUTO: ABNORMAL
HYALINE CASTS UR QL AUTO: ABNORMAL /LPF
KETONES UR QL STRIP: NEGATIVE
KETONES UR QL: NEGATIVE
LEUKOCYTE EST, POC: ABNORMAL
LEUKOCYTE ESTERASE UR QL STRIP.AUTO: ABNORMAL
NITRITE UR QL STRIP: NEGATIVE
NITRITE UR-MCNC: NEGATIVE MG/ML
PH UR STRIP.AUTO: 5.5 [PH] (ref 5–8)
PH UR: 6 [PH] (ref 5–8)
PROT UR QL STRIP: NEGATIVE
PROT UR STRIP-MCNC: NEGATIVE MG/DL
RBC # UR STRIP: NEGATIVE /UL
RBC # UR: ABNORMAL /HPF
REF LAB TEST METHOD: ABNORMAL
SP GR UR STRIP: 1.01 (ref 1–1.03)
SP GR UR: 1.01 (ref 1–1.03)
SQUAMOUS #/AREA URNS HPF: ABNORMAL /HPF
UROBILINOGEN UR QL STRIP: ABNORMAL
UROBILINOGEN UR QL: NORMAL
WBC UR QL AUTO: ABNORMAL /HPF

## 2019-07-03 PROCEDURE — 99213 OFFICE O/P EST LOW 20 MIN: CPT | Performed by: NURSE PRACTITIONER

## 2019-07-03 PROCEDURE — 81003 URINALYSIS AUTO W/O SCOPE: CPT | Performed by: NURSE PRACTITIONER

## 2019-07-03 PROCEDURE — 81001 URINALYSIS AUTO W/SCOPE: CPT | Performed by: NURSE PRACTITIONER

## 2019-07-03 RX ORDER — FLUCONAZOLE 150 MG/1
150 TABLET ORAL DAILY
Qty: 2 TABLET | Refills: 0 | Status: SHIPPED | OUTPATIENT
Start: 2019-07-03 | End: 2019-07-05

## 2019-07-03 RX ORDER — NITROFURANTOIN 25; 75 MG/1; MG/1
100 CAPSULE ORAL 2 TIMES DAILY
Qty: 14 CAPSULE | Refills: 0 | Status: SHIPPED | OUTPATIENT
Start: 2019-07-03 | End: 2019-07-05 | Stop reason: SINTOL

## 2019-07-03 NOTE — PATIENT INSTRUCTIONS
Urinary Tract Infection, Adult  A urinary tract infection (UTI) is an infection of any part of the urinary tract. The urinary tract includes the:  · Kidneys.  · Ureters.  · Bladder.  · Urethra.    These organs make, store, and get rid of pee (urine) in the body.  Follow these instructions at home:  · Take over-the-counter and prescription medicines only as told by your doctor.  · If you were prescribed an antibiotic medicine, take it as told by your doctor. Do not stop taking the antibiotic even if you start to feel better.  · Avoid the following drinks:  ? Alcohol.  ? Caffeine.  ? Tea.  ? Carbonated drinks.  · Drink enough fluid to keep your pee clear or pale yellow.  · Keep all follow-up visits as told by your doctor. This is important.  · Make sure to:  ? Empty your bladder often and completely. Do not to hold pee for long periods of time.  ? Empty your bladder before and after sex.  ? Wipe from front to back after a bowel movement if you are female. Use each tissue one time when you wipe.  Contact a doctor if:  · You have back pain.  · You have a fever.  · You feel sick to your stomach (nauseous).  · You throw up (vomit).  · Your symptoms do not get better after 3 days.  · Your symptoms go away and then come back.  Get help right away if:  · You have very bad back pain.  · You have very bad lower belly (abdominal) pain.  · You are throwing up and cannot keep down any medicines or water.  This information is not intended to replace advice given to you by your health care provider. Make sure you discuss any questions you have with your health care provider.  Document Released: 06/05/2009 Document Revised: 06/12/2018 Document Reviewed: 11/07/2016  [employer] Interactive Patient Education © 2019 [employer]

## 2019-07-03 NOTE — PROGRESS NOTES
Tennille Gasca is a 74 y.o. female who presents to the clinic today c/o urinary tract symptoms  which started appx one week ago. Associated symptoms include dysuria, suprapubic pressure, urinary frequency and urgency. She has tried her Estrace cream without adequate relief.   .   Urinary Tract Infection    This is a new problem. The current episode started in the past 7 days. The problem has been gradually worsening. The quality of the pain is described as burning. There has been no fever. She is not sexually active. There is no history of pyelonephritis. Associated symptoms include frequency and urgency. Pertinent negatives include no chills, flank pain, hematuria, nausea, possible pregnancy, sweats or vomiting. Treatments tried: Hormone cream. The treatment provided no relief. There is no history of catheterization, urinary stasis or a urological procedure.   Refer to ROS for additional information    The following portions of the patient's history were reviewed and updated as appropriate: allergies, current medications, past family history, past medical history, past social history, past surgical history and problem list.    Current Outpatient Medications:   •  cholecalciferol (VITAMIN D3) 1000 UNITS tablet, Take 1,000 Units by mouth daily., Disp: , Rfl:   •  estradiol (ESTRACE) 0.1 MG/GM vaginal cream, Insert 1 g into the vagina 3 (Three) Times a Week., Disp: 42.5 g, Rfl: 5  •  phentermine (ADIPEX-P) 37.5 MG tablet, Take 1 tablet by mouth Every Morning Before Breakfast., Disp: 30 tablet, Rfl: 5  •  SYNTHROID 112 MCG tablet, Take 1 tablet by mouth Daily., Disp: 30 tablet, Rfl: 5  •  vitamin B-12 (CYANOCOBALAMIN) 500 MCG tablet, Take 500 mcg by mouth daily., Disp: , Rfl:   •  Zoster Vac Recomb Adjuvanted (SHINGRIX) 50 MCG/0.5ML reconstituted suspension, Inject 50 mcg into the appropriate muscle as directed by prescriber See Admin Instructions. Repeat in 2-6 months, Disp: 1 each, Rfl: 1  •  DUREZOL 0.05 %  "ophthalmic emulsion, , Disp: , Rfl:   •  fluconazole (DIFLUCAN) 150 MG tablet, Take 1 tablet by mouth Daily for 2 doses., Disp: 2 tablet, Rfl: 0  •  nitrofurantoin, macrocrystal-monohydrate, (MACROBID) 100 MG capsule, Take 1 capsule by mouth 2 (Two) Times a Day for 7 days., Disp: 14 capsule, Rfl: 0    Allergies   Allergen Reactions   • Sulfa Antibiotics Rash     Review of Systems   Constitutional: Negative for activity change, appetite change, chills, diaphoresis and fever.   Gastrointestinal: Positive for abdominal pain. Negative for nausea and vomiting.   Genitourinary: Positive for dysuria, frequency and urgency. Negative for decreased urine volume, difficulty urinating, flank pain, hematuria, pelvic pain and vaginal discharge.   Musculoskeletal: Negative for myalgias.   Neurological: Negative for dizziness and headaches.     Visit Vitals  /80 (BP Location: Left arm, Patient Position: Sitting, Cuff Size: Adult)   Pulse 92   Temp 97.6 °F (36.4 °C) (Temporal)   Resp 14   Ht 168.9 cm (66.5\")   Wt 96.2 kg (212 lb)   SpO2 97%   BMI 33.71 kg/m²     Physical Exam   Constitutional: She is oriented to person, place, and time. She appears well-developed and well-nourished. No distress.   HENT:   Head: Normocephalic.   Nose: Nose normal.   Mouth/Throat: Oropharynx is clear and moist and mucous membranes are normal. No oropharyngeal exudate.   Eyes: Conjunctivae are normal. Pupils are equal, round, and reactive to light. Right eye exhibits no discharge. Left eye exhibits no discharge. No scleral icterus.   Neck: Neck supple.   Cardiovascular: Normal rate, regular rhythm and normal heart sounds. Exam reveals no friction rub.   No murmur heard.  Pulmonary/Chest: Effort normal and breath sounds normal. No respiratory distress. She has no wheezes. She has no rales.   Abdominal: There is tenderness in the suprapubic area. There is no rigidity, no guarding and no CVA tenderness.   Musculoskeletal: She exhibits no edema. "   Lymphadenopathy:     She has no cervical adenopathy.   Neurological: She is alert and oriented to person, place, and time.   Skin: Skin is warm and dry. Capillary refill takes less than 2 seconds. No rash noted. No erythema.   Vitals reviewed.    Lab Results (last 24 hours)     Procedure Component Value Units Date/Time    POC Urinalysis Dipstick, Automated [[id number]]  (Abnormal) Collected:  07/03/19 1014    Specimen:  Urine Updated:  07/03/19 1016     Color Yellow     Clarity, UA Cloudy     Specific Gravity  1.010     pH, Urine 6.0     Leukocytes Large (3+)     Nitrite, UA Negative     Protein, POC Negative mg/dL      Glucose, UA Negative mg/dL      Ketones, UA Negative     Urobilinogen, UA Normal     Bilirubin Negative     Blood, UA Negative    Urinalysis With Microscopic If Indicated (No Culture) - Urine, Clean Catch [[id number]] Collected:  07/03/19 1016    Specimen:  Urine, Clean Catch Updated:  07/03/19 1016        Assessment/Plan   Diagnoses and all orders for this visit:    Urinary tract infection without hematuria, site unspecified  Comments:  Urine sent for further evaluation and she will be notified when results are available. Will prescribe Macrobid and Diflucan.    Orders:  -     nitrofurantoin, macrocrystal-monohydrate, (MACROBID) 100 MG capsule; Take 1 capsule by mouth 2 (Two) Times a Day for 7 days.    Lower urinary tract symptoms  Comments:  Findings and recommendations discussed with Tennille. Reviewed results of her Urinalysis and treatment options.  Orders:  -     Urine Culture - Urine, Urine, Catheter; Future  -     Urinalysis With Microscopic If Indicated (No Culture) - Urine, Clean Catch; Future  -     POC Urinalysis Dipstick, Automated  -     Urinalysis With Microscopic If Indicated (No Culture) - Urine, Clean Catch    Other orders  -     fluconazole (DIFLUCAN) 150 MG tablet; Take 1 tablet by mouth Daily for 2 doses.      Findings and recommendations discussed with Tennille. Reviewed results of  her Urinalysis and treatment options.Urine sent for further evaluation and she will be notified when results are available. Will prescribe Macrobid and Diflucan.  Counseled regarding supportive care measures. S/S of concern reviewed and if occur to seek further medical evaluation or if symptoms do not improve within 48-72 hours.            This document has been electronically signed by MARCELO Mina FNP-BC, CDE  July 3, 2019 12:04 PM

## 2019-07-05 ENCOUNTER — LAB (OUTPATIENT)
Dept: FAMILY MEDICINE CLINIC | Facility: CLINIC | Age: 74
End: 2019-07-05

## 2019-07-05 DIAGNOSIS — N39.0 URINARY TRACT INFECTION WITHOUT HEMATURIA, SITE UNSPECIFIED: Primary | ICD-10-CM

## 2019-07-05 PROCEDURE — 87086 URINE CULTURE/COLONY COUNT: CPT | Performed by: NURSE PRACTITIONER

## 2019-07-05 RX ORDER — AMOXICILLIN AND CLAVULANATE POTASSIUM 875; 125 MG/1; MG/1
1 TABLET, FILM COATED ORAL 2 TIMES DAILY
Qty: 20 TABLET | Refills: 0 | Status: SHIPPED | OUTPATIENT
Start: 2019-07-05 | End: 2019-07-15

## 2019-07-06 LAB — BACTERIA SPEC AEROBE CULT: NO GROWTH

## 2019-07-06 NOTE — PROGRESS NOTES
Tennille reports Macrobid caused blisters in her mouth and requesting a change of antibiotics. Will change to Augmentin 875-125 mg twice daily for ten days. Requested she return to the clinic for a urine culture and sensitivity to be done. She will be notified when results are available.

## 2019-07-15 ENCOUNTER — APPOINTMENT (OUTPATIENT)
Dept: MAMMOGRAPHY | Facility: HOSPITAL | Age: 74
End: 2019-07-15

## 2019-09-27 ENCOUNTER — HOSPITAL ENCOUNTER (OUTPATIENT)
Dept: MAMMOGRAPHY | Facility: HOSPITAL | Age: 74
Discharge: HOME | End: 2019-09-27
Admitting: GENERAL PRACTICE

## 2019-09-27 DIAGNOSIS — Z00.00 HEALTHCARE MAINTENANCE: ICD-10-CM

## 2019-09-27 DIAGNOSIS — Z12.31 ENCOUNTER FOR SCREENING MAMMOGRAM FOR BREAST CANCER: ICD-10-CM

## 2019-09-27 PROCEDURE — 77067 SCR MAMMO BI INCL CAD: CPT

## 2019-09-27 PROCEDURE — 77063 BREAST TOMOSYNTHESIS BI: CPT

## 2019-09-27 PROCEDURE — 77067 SCR MAMMO BI INCL CAD: CPT | Performed by: RADIOLOGY

## 2019-09-27 PROCEDURE — 77063 BREAST TOMOSYNTHESIS BI: CPT | Performed by: RADIOLOGY

## 2019-10-09 ENCOUNTER — HOSPITAL ENCOUNTER (OUTPATIENT)
Dept: ULTRASOUND IMAGING | Facility: HOSPITAL | Age: 74
Discharge: HOME | End: 2019-10-09
Admitting: RADIOLOGY

## 2019-10-09 DIAGNOSIS — R92.8 ABNORMAL MAMMOGRAM: ICD-10-CM

## 2019-10-09 PROCEDURE — 76642 ULTRASOUND BREAST LIMITED: CPT

## 2019-10-09 PROCEDURE — 76642 ULTRASOUND BREAST LIMITED: CPT | Performed by: RADIOLOGY

## 2019-11-04 DIAGNOSIS — E66.01 MORBID OBESITY, UNSPECIFIED OBESITY TYPE (HCC): ICD-10-CM

## 2019-11-04 DIAGNOSIS — E03.4 HYPOTHYROIDISM DUE TO ACQUIRED ATROPHY OF THYROID: ICD-10-CM

## 2019-11-04 DIAGNOSIS — N95.1 MENOPAUSAL SYMPTOM: ICD-10-CM

## 2019-11-04 RX ORDER — PHENTERMINE HYDROCHLORIDE 37.5 MG/1
37.5 CAPSULE ORAL EVERY MORNING
Qty: 30 CAPSULE | Refills: 0 | Status: SHIPPED | OUTPATIENT
Start: 2019-11-04 | End: 2019-12-19 | Stop reason: SDUPTHER

## 2019-11-04 RX ORDER — MELATONIN
1000 DAILY
Qty: 30 TABLET | Refills: 5 | Status: SHIPPED | OUTPATIENT
Start: 2019-11-04 | End: 2020-09-28 | Stop reason: SDUPTHER

## 2019-11-04 RX ORDER — LEVOTHYROXINE SODIUM 112 MCG
112 TABLET ORAL DAILY
Qty: 30 TABLET | Refills: 5 | Status: SHIPPED | OUTPATIENT
Start: 2019-11-04 | End: 2020-03-23 | Stop reason: SDUPTHER

## 2019-11-04 RX ORDER — ESTRADIOL 0.1 MG/G
1 CREAM VAGINAL 3 TIMES WEEKLY
Qty: 42.5 G | Refills: 5 | Status: SHIPPED | OUTPATIENT
Start: 2019-11-04 | End: 2020-03-23 | Stop reason: SDUPTHER

## 2019-11-04 RX ORDER — CHOLECALCIFEROL (VITAMIN D3) 125 MCG
500 CAPSULE ORAL DAILY
Qty: 30 TABLET | Refills: 5 | Status: SHIPPED | OUTPATIENT
Start: 2019-11-04 | End: 2020-09-28 | Stop reason: SDUPTHER

## 2019-11-04 NOTE — TELEPHONE ENCOUNTER
Pt of Dr. Lawson. Could you refill this? Calos on your desk.   ----- Message from Quan Mckenzie Rep sent at 11/4/2019 10:59 AM EST -----  [address]   Needs refills on all meds and the adipex needs to be caps pharmacy cant get tablets

## 2019-12-19 ENCOUNTER — OFFICE VISIT (OUTPATIENT)
Dept: FAMILY MEDICINE CLINIC | Facility: CLINIC | Age: 74
End: 2019-12-19

## 2019-12-19 VITALS
HEART RATE: 104 BPM | SYSTOLIC BLOOD PRESSURE: 162 MMHG | HEIGHT: 67 IN | BODY MASS INDEX: 33.9 KG/M2 | TEMPERATURE: 98.8 F | RESPIRATION RATE: 12 BRPM | WEIGHT: 216 LBS | DIASTOLIC BLOOD PRESSURE: 78 MMHG | OXYGEN SATURATION: 94 %

## 2019-12-19 DIAGNOSIS — F33.41 RECURRENT MAJOR DEPRESSIVE DISORDER, IN PARTIAL REMISSION (HCC): ICD-10-CM

## 2019-12-19 DIAGNOSIS — N95.1 MENOPAUSAL SYMPTOM: ICD-10-CM

## 2019-12-19 DIAGNOSIS — M85.80 OSTEOPENIA, UNSPECIFIED LOCATION: ICD-10-CM

## 2019-12-19 DIAGNOSIS — E55.9 VITAMIN D DEFICIENCY: Primary | ICD-10-CM

## 2019-12-19 DIAGNOSIS — R30.0 DYSURIA: ICD-10-CM

## 2019-12-19 DIAGNOSIS — Z00.00 HEALTHCARE MAINTENANCE: ICD-10-CM

## 2019-12-19 DIAGNOSIS — R03.0 ELEVATED BLOOD PRESSURE READING: ICD-10-CM

## 2019-12-19 DIAGNOSIS — E03.4 HYPOTHYROIDISM DUE TO ACQUIRED ATROPHY OF THYROID: ICD-10-CM

## 2019-12-19 DIAGNOSIS — E66.01 MORBID OBESITY (HCC): ICD-10-CM

## 2019-12-19 PROBLEM — R19.5 POSITIVE COLORECTAL CANCER SCREENING USING COLOGUARD TEST: Status: RESOLVED | Noted: 2019-06-17 | Resolved: 2019-12-19

## 2019-12-19 LAB
BILIRUB BLD-MCNC: NEGATIVE MG/DL
CLARITY, POC: CLEAR
COLOR UR: YELLOW
GLUCOSE UR STRIP-MCNC: NEGATIVE MG/DL
KETONES UR QL: NEGATIVE
LEUKOCYTE EST, POC: NEGATIVE
NITRITE UR-MCNC: NEGATIVE MG/ML
PH UR: 5.5 [PH] (ref 5–8)
PROT UR STRIP-MCNC: NEGATIVE MG/DL
RBC # UR STRIP: NEGATIVE /UL
SP GR UR: 1.01 (ref 1–1.03)
UROBILINOGEN UR QL: NORMAL

## 2019-12-19 PROCEDURE — 99214 OFFICE O/P EST MOD 30 MIN: CPT | Performed by: GENERAL PRACTICE

## 2019-12-19 PROCEDURE — 81003 URINALYSIS AUTO W/O SCOPE: CPT | Performed by: GENERAL PRACTICE

## 2019-12-19 RX ORDER — PHENTERMINE HYDROCHLORIDE 37.5 MG/1
37.5 CAPSULE ORAL EVERY MORNING
Qty: 30 CAPSULE | Refills: 3 | Status: SHIPPED | OUTPATIENT
Start: 2019-12-19 | End: 2020-03-23 | Stop reason: SDUPTHER

## 2019-12-19 NOTE — PROGRESS NOTES
Subjective   Tennille Gasca is a 74 y.o. female.     History of Present Illness     Depression  Onset was a number of years ago. She has continued to do well since last here. She and her  have been busy renovating their house. Current symptoms include: intermittent nervousness and worrying. Patient denies depressed mood, anhedonia, difficulty concentrating, impaired memory, recurrent thoughts of death or suicidal thoughts.  With melatonin she continues to average 7-8 hours of sleep nightly.    Hypothyroidism  Patient returns for evaluation of thyroid function. Symptoms consist of fatigue, weight gain. The symptoms are mild. Previous thyroid studies include TSH. The hypothyroidism is due to Hashimoto's disease. She is currently prescribed levothyroxine.    Lab Results   Component Value Date    TSH 4.590 (H) 05/06/2019     Knee Pain  She continues to have intermittent knee pain as previously described. There has been no change in the quality nor any new associated symptoms.  Current symptoms include pain located anteriorly and stiffness. Pain is aggravated by rising after sitting and prolonged weightbearing. Patient has had no prior knee problems. Evaluation to date: none. Treatment to date: none.    Labs  Lab Results   Component Value Date    CHOL 181 05/06/2019    CHLPL 197 03/17/2016    TRIG 58 05/06/2019    HDL 54 05/06/2019     (H) 05/06/2019     The following portions of the patient's history were reviewed and updated as appropriate: allergies, current medications, past medical history, past social history and problem list.    Review of Systems   Constitutional: Positive for fatigue. Negative for appetite change, chills, fever and unexpected weight change.   HENT: Negative for congestion, ear pain, rhinorrhea, sneezing, sore throat and voice change.    Eyes: Negative for visual disturbance.   Respiratory: Negative for cough, shortness of breath and wheezing.    Cardiovascular: Negative for chest  pain, palpitations and leg swelling.   Gastrointestinal: Negative for abdominal pain, blood in stool, constipation, diarrhea, nausea and vomiting.   Genitourinary: Negative for difficulty urinating, dysuria, frequency, hematuria and urgency.   Musculoskeletal: Positive for arthralgias. Negative for back pain, joint swelling, myalgias and neck pain.   Neurological: Negative for weakness, numbness and headaches.   Psychiatric/Behavioral: Negative for dysphoric mood, sleep disturbance and suicidal ideas. The patient is nervous/anxious.      Objective   Physical Exam   Constitutional: She is oriented to person, place, and time. No distress.   Bright and in good spirits. No apparent distress. No pallor, jaundice, diaphoresis, or cyanosis.   HENT:   Head: Atraumatic.   Right Ear: Tympanic membrane, external ear and ear canal normal.   Left Ear: Tympanic membrane, external ear and ear canal normal.   Nose: Nose normal.   Mouth/Throat: Oropharynx is clear and moist. Mucous membranes are not pale and not cyanotic.   Eyes: Pupils are equal, round, and reactive to light. EOM are normal. No scleral icterus.   Neck: No JVD present. Carotid bruit is not present. No tracheal deviation present. No thyromegaly present.   Cardiovascular: Normal rate, regular rhythm, S1 normal, S2 normal and intact distal pulses. Exam reveals no gallop, no S3 and no S4.   No murmur heard.  Pulmonary/Chest: Breath sounds normal. No stridor. No respiratory distress.   Abdominal: Normal aorta. She exhibits no abdominal bruit. There is no hepatosplenomegaly.   Musculoskeletal: She exhibits no deformity.     Vascular Status -  Her right foot exhibits no edema. Her left foot exhibits no edema.  Lymphadenopathy:        Head (right side): No submandibular adenopathy present.        Head (left side): No submandibular adenopathy present.     She has no cervical adenopathy.   Neurological: She is alert and oriented to person, place, and time. No cranial nerve  deficit.   Skin: Skin is warm and dry. No rash noted. She is not diaphoretic. No cyanosis. No pallor. Nails show no clubbing.   Psychiatric: She has a normal mood and affect.     Assessment/Plan   Problems Addressed this Visit        Cardiovascular and Mediastinum    Elevated blood pressure reading  Encouraged to continue to work on her diet and exercise plan.  If BP remains elevated at her return will initiate antihypertensive therapy    Relevant Orders    POCT urinalysis dipstick, automated (Completed)       Digestive    Vitamin D deficiency   Continue supplementation with monitoring.    Morbid obesity (CMS/HCC)  As above.   Continue current medication.    Relevant Medications    phentermine 37.5 MG capsule       Endocrine    Hypothyroidism due to acquired atrophy of thyroid  Clinically and bio-chemically euthyroid.  Continue current medication.       Musculoskeletal and Integument    Osteopenia       Genitourinary    Menopausal symptom       Other    Recurrent major depressive disorder, in partial remission (CMS/HCC)    Stable.  Supportive therapy.   Continue current medication.        Healthcare maintenance  Recommended a flu shot and hepatitis A #2. Patient will obtain at her return

## 2020-03-23 ENCOUNTER — OFFICE VISIT (OUTPATIENT)
Dept: FAMILY MEDICINE CLINIC | Facility: CLINIC | Age: 75
End: 2020-03-23

## 2020-03-23 VITALS
SYSTOLIC BLOOD PRESSURE: 146 MMHG | RESPIRATION RATE: 12 BRPM | TEMPERATURE: 97.5 F | HEART RATE: 90 BPM | OXYGEN SATURATION: 98 % | HEIGHT: 67 IN | DIASTOLIC BLOOD PRESSURE: 90 MMHG | BODY MASS INDEX: 34.06 KG/M2 | WEIGHT: 217 LBS

## 2020-03-23 DIAGNOSIS — E66.01 MORBID OBESITY (HCC): ICD-10-CM

## 2020-03-23 DIAGNOSIS — E03.4 HYPOTHYROIDISM DUE TO ACQUIRED ATROPHY OF THYROID: ICD-10-CM

## 2020-03-23 DIAGNOSIS — N95.1 MENOPAUSAL SYMPTOM: ICD-10-CM

## 2020-03-23 DIAGNOSIS — G89.29 CHRONIC PAIN OF BOTH KNEES: ICD-10-CM

## 2020-03-23 DIAGNOSIS — F33.41 RECURRENT MAJOR DEPRESSIVE DISORDER, IN PARTIAL REMISSION (HCC): ICD-10-CM

## 2020-03-23 DIAGNOSIS — M85.80 OSTEOPENIA, UNSPECIFIED LOCATION: ICD-10-CM

## 2020-03-23 DIAGNOSIS — K80.20 ASYMPTOMATIC CHOLELITHIASIS: ICD-10-CM

## 2020-03-23 DIAGNOSIS — I10 ESSENTIAL HYPERTENSION: ICD-10-CM

## 2020-03-23 DIAGNOSIS — Z00.00 HEALTHCARE MAINTENANCE: ICD-10-CM

## 2020-03-23 DIAGNOSIS — E55.9 VITAMIN D DEFICIENCY: Primary | ICD-10-CM

## 2020-03-23 DIAGNOSIS — M25.562 CHRONIC PAIN OF BOTH KNEES: ICD-10-CM

## 2020-03-23 DIAGNOSIS — M25.561 CHRONIC PAIN OF BOTH KNEES: ICD-10-CM

## 2020-03-23 PROCEDURE — 99214 OFFICE O/P EST MOD 30 MIN: CPT | Performed by: GENERAL PRACTICE

## 2020-03-23 RX ORDER — LEVOTHYROXINE SODIUM 112 MCG
112 TABLET ORAL DAILY
Qty: 30 TABLET | Refills: 5 | Status: SHIPPED | OUTPATIENT
Start: 2020-03-23 | End: 2020-06-23 | Stop reason: SDUPTHER

## 2020-03-23 RX ORDER — PHENTERMINE HYDROCHLORIDE 37.5 MG/1
37.5 CAPSULE ORAL EVERY MORNING
Qty: 30 CAPSULE | Refills: 3 | Status: SHIPPED | OUTPATIENT
Start: 2020-03-23 | End: 2020-06-23 | Stop reason: SDUPTHER

## 2020-03-23 RX ORDER — LOSARTAN POTASSIUM 50 MG/1
50 TABLET ORAL DAILY
Qty: 30 TABLET | Refills: 5 | Status: SHIPPED | OUTPATIENT
Start: 2020-03-23 | End: 2020-06-23 | Stop reason: SDUPTHER

## 2020-03-23 RX ORDER — ESTRADIOL 0.1 MG/G
1 CREAM VAGINAL 3 TIMES WEEKLY
Qty: 42.5 G | Refills: 5 | Status: SHIPPED | OUTPATIENT
Start: 2020-03-23 | End: 2020-06-23 | Stop reason: SDUPTHER

## 2020-03-23 NOTE — PROGRESS NOTES
Subjective   Tennille Gasca is a 75 y.o. female.     History of Present Illness     Elevated Blood Pressure  Her blood pressure has generally run above 140/90 when checked at home since last here.  She denies any chest pain, palpitations, lightheadedness, shortness of breath, or some of the ankles and has had no changes in her vision, strength, or sensation    Depression  Onset was a number of years ago. She has continued to do well since last here. She and her  continue to renovate their house. Current symptoms include: intermittent nervousness and worrying. Patient denies depressed mood, anhedonia, difficulty concentrating, impaired memory, recurrent thoughts of death or suicidal thoughts.  With melatonin she continues to average 7-8 hours of sleep nightly.    Hypothyroidism  Patient returns for evaluation of thyroid function. Symptoms consist of fatigue, weight gain. The symptoms are mild. Previous thyroid studies include TSH. The hypothyroidism is due to Hashimoto's disease. She is currently prescribed levothyroxine.  She has had no recent labs    Knee Pain  She continues to have intermittent knee pain as previously described. There has been no change in the quality nor any new associated symptoms.  Current symptoms include pain located anteriorly and stiffness. Pain is aggravated by rising after sitting and prolonged weightbearing. Patient has had no prior knee problems. Evaluation to date: none. Treatment to date: none.    The following portions of the patient's history were reviewed and updated as appropriate: allergies, current medications, past medical history, past social history and problem list.    Review of Systems   Constitutional: Positive for fatigue. Negative for appetite change, chills, fever and unexpected weight change.   HENT: Negative for congestion, ear pain, rhinorrhea, sneezing, sore throat and voice change.    Eyes: Negative for visual disturbance.   Respiratory: Negative for cough,  shortness of breath and wheezing.    Cardiovascular: Negative for chest pain, palpitations and leg swelling.   Gastrointestinal: Negative for abdominal pain, blood in stool, constipation, diarrhea, nausea and vomiting.   Genitourinary: Negative for difficulty urinating, dysuria, frequency, hematuria and urgency.   Musculoskeletal: Positive for arthralgias. Negative for back pain, joint swelling, myalgias and neck pain.   Skin: Negative for rash.   Neurological: Negative for weakness, numbness and headaches.   Psychiatric/Behavioral: Negative for dysphoric mood, sleep disturbance and suicidal ideas. The patient is nervous/anxious.      Objective   Physical Exam   Constitutional: She is oriented to person, place, and time. No distress.   Bright and in good spirits. No apparent distress. No pallor, jaundice, diaphoresis, or cyanosis.   HENT:   Head: Atraumatic.   Right Ear: Tympanic membrane, external ear and ear canal normal.   Left Ear: Tympanic membrane, external ear and ear canal normal.   Nose: Nose normal.   Mouth/Throat: Oropharynx is clear and moist. Mucous membranes are not pale and not cyanotic.   Eyes: Pupils are equal, round, and reactive to light. EOM are normal. No scleral icterus.   Neck: No JVD present. Carotid bruit is not present. No tracheal deviation present. No thyromegaly present.   Cardiovascular: Normal rate, regular rhythm, S1 normal, S2 normal and intact distal pulses. Exam reveals no gallop, no S3 and no S4.   No murmur heard.  Pulmonary/Chest: Breath sounds normal. No stridor. No respiratory distress.   Abdominal: Normal aorta. She exhibits no abdominal bruit. There is no hepatosplenomegaly.   Musculoskeletal: She exhibits no deformity.     Vascular Status -  Her right foot exhibits no edema. Her left foot exhibits no edema.  Lymphadenopathy:        Head (right side): No submandibular adenopathy present.        Head (left side): No submandibular adenopathy present.     She has no cervical  adenopathy.   Neurological: She is alert and oriented to person, place, and time. No cranial nerve deficit.   Skin: Skin is warm and dry. No rash noted. She is not diaphoretic. No cyanosis. No pallor. Nails show no clubbing.   Psychiatric: She has a normal mood and affect.     Assessment/Plan   Problems Addressed this Visit        Cardiovascular and Mediastinum    Essential hypertension  Encouraged to continue to work on her diet and exercise plan.  Reviewed options and agreed on a trial of losartan  Updated labs will be drawn at her return.    Relevant Medications    losartan (COZAAR) 50 MG tablet       Digestive    Vitamin D deficiency   Continue supplementation with monitoring.    Morbid obesity (CMS/HCC)    Relevant Medications    phentermine 37.5 MG capsule    Asymptomatic cholelithiasis       Endocrine    Hypothyroidism due to acquired atrophy of thyroid  Clinically euthyroid.  Continue current medication.  Will continue to monitor    Relevant Medications    SYNTHROID 112 MCG tablet       Musculoskeletal and Integument    Osteopenia    Bilateral knee pain  Reminded regarding symptomatic treatment.   Continue current medication       Genitourinary    Menopausal symptom    Relevant Medications    estradiol (ESTRACE) 0.1 MG/GM vaginal cream       Other    Recurrent major depressive disorder, in partial remission (CMS/HCC)  Stable.  Supportive therapy.   Continue current medication.    Relevant Medications    phentermine 37.5 MG capsule    Healthcare maintenance  Reminded to obtain hepatitis A #2  Will plan on updating a DEXA scan at the time of her mammogram later this year

## 2020-04-27 ENCOUNTER — TELEPHONE (OUTPATIENT)
Dept: FAMILY MEDICINE CLINIC | Facility: CLINIC | Age: 75
End: 2020-04-27

## 2020-04-27 RX ORDER — CIPROFLOXACIN 250 MG/1
250 TABLET, FILM COATED ORAL 2 TIMES DAILY
Qty: 14 TABLET | Refills: 0 | Status: SHIPPED | OUTPATIENT
Start: 2020-04-27 | End: 2020-04-28 | Stop reason: SDUPTHER

## 2020-04-27 NOTE — TELEPHONE ENCOUNTER
Pt is aware of this information.       ----- Message from Alfonso Rubi MD sent at 4/27/2020  3:40 PM EDT -----  Emailed script for cipro    ----- Message -----  From: Caroline Jolly MA  Sent: 4/27/2020  11:39 AM EDT  To: Alfonso Rubi MD    Patient called and stated that she has a bladder infection. She wanted to know if you could send her in some antibiotics?

## 2020-04-28 RX ORDER — CIPROFLOXACIN 250 MG/1
250 TABLET, FILM COATED ORAL 2 TIMES DAILY
Qty: 14 TABLET | Refills: 0 | Status: SHIPPED | OUTPATIENT
Start: 2020-04-28 | End: 2020-05-05

## 2020-06-23 ENCOUNTER — OFFICE VISIT (OUTPATIENT)
Dept: FAMILY MEDICINE CLINIC | Facility: CLINIC | Age: 75
End: 2020-06-23

## 2020-06-23 ENCOUNTER — TELEPHONE (OUTPATIENT)
Dept: FAMILY MEDICINE CLINIC | Facility: CLINIC | Age: 75
End: 2020-06-23

## 2020-06-23 DIAGNOSIS — I10 ESSENTIAL HYPERTENSION: Primary | ICD-10-CM

## 2020-06-23 DIAGNOSIS — M85.80 OSTEOPENIA, UNSPECIFIED LOCATION: ICD-10-CM

## 2020-06-23 DIAGNOSIS — F33.41 RECURRENT MAJOR DEPRESSIVE DISORDER, IN PARTIAL REMISSION (HCC): ICD-10-CM

## 2020-06-23 DIAGNOSIS — N95.2 ATROPHIC VAGINITIS: ICD-10-CM

## 2020-06-23 DIAGNOSIS — M25.561 CHRONIC PAIN OF BOTH KNEES: ICD-10-CM

## 2020-06-23 DIAGNOSIS — E55.9 VITAMIN D DEFICIENCY: ICD-10-CM

## 2020-06-23 DIAGNOSIS — G89.29 CHRONIC PAIN OF BOTH KNEES: ICD-10-CM

## 2020-06-23 DIAGNOSIS — M25.562 CHRONIC PAIN OF BOTH KNEES: ICD-10-CM

## 2020-06-23 DIAGNOSIS — Z00.00 HEALTHCARE MAINTENANCE: ICD-10-CM

## 2020-06-23 DIAGNOSIS — E66.01 MORBID OBESITY (HCC): ICD-10-CM

## 2020-06-23 DIAGNOSIS — E03.4 HYPOTHYROIDISM DUE TO ACQUIRED ATROPHY OF THYROID: ICD-10-CM

## 2020-06-23 DIAGNOSIS — N95.1 MENOPAUSAL SYMPTOM: ICD-10-CM

## 2020-06-23 PROCEDURE — G2025 DIS SITE TELE SVCS RHC/FQHC: HCPCS | Performed by: GENERAL PRACTICE

## 2020-06-23 RX ORDER — PHENTERMINE HYDROCHLORIDE 37.5 MG/1
37.5 CAPSULE ORAL EVERY MORNING
Qty: 30 CAPSULE | Refills: 3 | Status: SHIPPED | OUTPATIENT
Start: 2020-06-23 | End: 2020-09-28 | Stop reason: SDUPTHER

## 2020-06-23 RX ORDER — PHENTERMINE HYDROCHLORIDE 37.5 MG/1
37.5 TABLET ORAL EVERY MORNING
COMMUNITY
Start: 2020-05-21 | End: 2020-06-23

## 2020-06-23 RX ORDER — LEVOTHYROXINE SODIUM 112 MCG
112 TABLET ORAL DAILY
Qty: 30 TABLET | Refills: 5 | Status: SHIPPED | OUTPATIENT
Start: 2020-06-23 | End: 2020-09-28 | Stop reason: SDUPTHER

## 2020-06-23 RX ORDER — LOSARTAN POTASSIUM 50 MG/1
50 TABLET ORAL DAILY
Qty: 30 TABLET | Refills: 5 | Status: SHIPPED | OUTPATIENT
Start: 2020-06-23 | End: 2020-09-28 | Stop reason: SDUPTHER

## 2020-06-23 RX ORDER — ESTRADIOL 0.1 MG/G
1 CREAM VAGINAL 3 TIMES WEEKLY
Qty: 42.5 G | Refills: 5 | Status: SHIPPED | OUTPATIENT
Start: 2020-06-24 | End: 2020-06-23

## 2020-06-23 NOTE — TELEPHONE ENCOUNTER
----- Message from Alfonso Rubi MD sent at 6/23/2020 12:18 PM EDT -----  MARISOL Beckwith emailed a script for the generic premarin cream  Thanks    ----- Message -----  From: Caroline Jolly MA  Sent: 6/23/2020  11:21 AM EDT  To: Alfonso Rubi MD    Patient's pharmacy called and stated that the estradiol cream is not covered on [email or web address] insurance. It is preferring the premarin cream. Do you want to change it or do you want me to send in a PA for her?

## 2020-06-23 NOTE — PROGRESS NOTES
Subjective   Tennille Gasca is a 75 y.o. female.     History of Present Illness     This visit has been rescheduled as a phone visit to comply with patient safety concerns in accordance with CDC recommendations. Total time of discussion was 13 minutes.    You have chosen to receive care through a telephone visit. Do you consent to use a telephone visit for your medical care today? Yes    Essential Hypertension  She has been taking losartan but reduce the dose to 25 every afternoon due to muscle aches with a prompt improvement.  She states her blood pressure has generally been running from 130-140/60-70. She denies any chest pain, palpitations, lightheadedness, shortness of breath, or swelling of the ankles and has had no changes in her vision, strength, or sensation    Hypothyroidism  Patient returns for evaluation of thyroid function. Symptoms consist of fatigue, weight gain. The symptoms are mild. Previous thyroid studies include TSH. The hypothyroidism is due to Hashimoto's disease. She is currently prescribed levothyroxine 112 daily.  She has had no recent labs    Depression  Onset was a number of years ago. She has continued to do well. She and her  continue to renovate their new house. Current symptoms include: intermittent nervousness and worrying. Patient denies depressed mood, anhedonia, difficulty concentrating, impaired memory, recurrent thoughts of death or suicidal thoughts.  With melatonin she continues to average 7-8 hours of sleep nightly.    Knee Pain  She continues to have intermittent knee pain as previously described. There has been no change in the quality nor any new associated symptoms.  Current symptoms include pain located anteriorly and stiffness. Pain is aggravated by rising after sitting and prolonged weightbearing. Patient has had no prior knee problems. Evaluation to date: none. Treatment to date: She has tried OTC topical diclofenac with a significant improvement and no  apparent side effects    The following portions of the patient's history were reviewed and updated as appropriate: allergies, current medications, past medical history, past social history and problem list.    Review of Systems   Constitutional: Positive for fatigue. Negative for appetite change, chills, fever and unexpected weight change.   HENT: Negative for congestion, ear pain, rhinorrhea, sneezing, sore throat and voice change.    Eyes: Negative for visual disturbance.   Respiratory: Negative for cough, shortness of breath and wheezing.    Cardiovascular: Negative for chest pain, palpitations and leg swelling.   Gastrointestinal: Negative for abdominal pain, blood in stool, constipation, diarrhea, nausea and vomiting.   Genitourinary: Negative for dysuria and hematuria.   Musculoskeletal: Positive for arthralgias. Negative for back pain, joint swelling and myalgias.   Skin: Negative for rash.   Neurological: Negative for weakness, numbness and headaches.   Psychiatric/Behavioral: Negative for dysphoric mood, sleep disturbance and suicidal ideas. The patient is nervous/anxious.      Objective   Physical Exam   Constitutional:   Alert and oriented.  Bright and in good spirits.  No apparent distress or shortness of breath.     Assessment/Plan   Problems Addressed this Visit        Cardiovascular and Mediastinum    Essential hypertension   Encouraged to continue to work on her diet and exercise plan.  Losartan will be titrated to 25 twice daily as tolerated  Scheduled for updated labs    Relevant Medications    losartan (COZAAR) 50 MG tablet    Other Relevant Orders    Comprehensive Metabolic Panel    CBC & Differential    Lipid Panel       Digestive    Morbid obesity (CMS/HCC)  As above.   Continue current medication.    Relevant Medications    phentermine 37.5 MG capsule    Vitamin D deficiency  Continue supplementation with monitoring.    Relevant Orders    Vitamin D 25 Hydroxy       Endocrine    Hypothyroidism  due to acquired atrophy of thyroid  Clinically euthyroid.  Continue current medication.    Relevant Medications    SYNTHROID 112 MCG tablet    Other Relevant Orders    TSH       Musculoskeletal and Integument    Bilateral knee pain  Reminded regarding symptomatic treatment.   Continue current medication    Relevant Medications    diclofenac (VOLTAREN) 1 % gel gel    Osteopenia  Encouraged to continue to pursue weight bearing activities while exercising joint protection.  We will plan on updating a DEXA scan with her mammogram in the fall    Relevant Orders    Vitamin D 25 Hydroxy       Genitourinary    Atrophic vaginitis    Relevant Medications    conjugated estrogens (PREMARIN) 0.625 MG/GM vaginal cream    Menopausal symptom    Relevant Medications    conjugated estrogens (PREMARIN) 0.625 MG/GM vaginal cream       Other    Healthcare maintenance  Recommended hepatitis A #2 along with Shingrix.  Prescriptions emailed to her pharmacy    Relevant Medications    Zoster Vac Recomb Adjuvanted (Shingrix) 50 MCG/0.5ML reconstituted suspension    Recurrent major depressive disorder, in partial remission (CMS/HCC)  Stable.  Supportive therapy.   Continue current medication.    Relevant Medications    phentermine 37.5 MG capsule

## 2020-09-10 ENCOUNTER — LAB (OUTPATIENT)
Dept: FAMILY MEDICINE CLINIC | Facility: CLINIC | Age: 75
End: 2020-09-10

## 2020-09-22 ENCOUNTER — OFFICE VISIT (OUTPATIENT)
Dept: FAMILY MEDICINE CLINIC | Facility: CLINIC | Age: 75
End: 2020-09-22

## 2020-09-22 DIAGNOSIS — I10 ESSENTIAL HYPERTENSION: Primary | ICD-10-CM

## 2020-09-22 DIAGNOSIS — G89.29 CHRONIC PAIN OF BOTH KNEES: ICD-10-CM

## 2020-09-22 DIAGNOSIS — N95.1 MENOPAUSAL SYMPTOM: ICD-10-CM

## 2020-09-22 DIAGNOSIS — E55.9 VITAMIN D DEFICIENCY: ICD-10-CM

## 2020-09-22 DIAGNOSIS — E66.01 MORBID OBESITY (HCC): ICD-10-CM

## 2020-09-22 DIAGNOSIS — K80.20 ASYMPTOMATIC CHOLELITHIASIS: ICD-10-CM

## 2020-09-22 DIAGNOSIS — M81.0 AGE-RELATED OSTEOPOROSIS WITHOUT CURRENT PATHOLOGICAL FRACTURE: ICD-10-CM

## 2020-09-22 DIAGNOSIS — Z00.00 HEALTHCARE MAINTENANCE: ICD-10-CM

## 2020-09-22 DIAGNOSIS — M25.561 CHRONIC PAIN OF BOTH KNEES: ICD-10-CM

## 2020-09-22 DIAGNOSIS — M85.80 OSTEOPENIA, UNSPECIFIED LOCATION: ICD-10-CM

## 2020-09-22 DIAGNOSIS — F33.41 RECURRENT MAJOR DEPRESSIVE DISORDER, IN PARTIAL REMISSION (HCC): ICD-10-CM

## 2020-09-22 DIAGNOSIS — Z23 ENCOUNTER FOR IMMUNIZATION: ICD-10-CM

## 2020-09-22 DIAGNOSIS — Z12.31 ENCOUNTER FOR SCREENING MAMMOGRAM FOR BREAST CANCER: ICD-10-CM

## 2020-09-22 DIAGNOSIS — E03.4 HYPOTHYROIDISM DUE TO ACQUIRED ATROPHY OF THYROID: ICD-10-CM

## 2020-09-22 DIAGNOSIS — M25.562 CHRONIC PAIN OF BOTH KNEES: ICD-10-CM

## 2020-09-22 PROCEDURE — 99214 OFFICE O/P EST MOD 30 MIN: CPT | Performed by: GENERAL PRACTICE

## 2020-09-22 PROCEDURE — G0008 ADMIN INFLUENZA VIRUS VAC: HCPCS | Performed by: GENERAL PRACTICE

## 2020-09-22 PROCEDURE — 90686 IIV4 VACC NO PRSV 0.5 ML IM: CPT | Performed by: GENERAL PRACTICE

## 2020-09-22 NOTE — PROGRESS NOTES
Subjective   Tennille Gasca is a 75 y.o. female.     History of Present Illness     Essential Hypertension  She has been taking losartan 25 twice daily with no apparent side effects and good control of her blood pressure at home. She she continues to deny any chest pain, palpitations, lightheadedness, shortness of breath, or swelling of the ankles and has had no changes in her vision, strength, or sensation    Hypothyroidism  Patient returns for evaluation of thyroid function. Symptoms consist of fatigue, weight gain. The symptoms are mild. Previous thyroid studies include TSH. The hypothyroidism is due to Hashimoto's disease. She is currently prescribed levothyroxine 112 daily.  She has yet to follow-up with the labs arranged at her last visit    Depression  Onset was a number of years ago. She has continued to do well. She and her  continue to renovate their new house. Current symptoms include: intermittent nervousness and worrying. Patient denies depressed mood, anhedonia, difficulty concentrating, impaired memory, recurrent thoughts of death or suicidal thoughts.  With melatonin she continues to average 7-8 hours of sleep nightly.    Knee Pain  She continues to have intermittent knee pain as previously described. There has been no change in the quality nor any new associated symptoms.  Current symptoms include pain located anteriorly and stiffness. Pain is aggravated by rising after sitting and prolonged weightbearing. Patient has had no prior knee problems. Evaluation to date: none. Treatment to date: She has tried OTC topical diclofenac with a significant improvement and no apparent side effects    The following portions of the patient's history were reviewed and updated as appropriate: allergies, current medications, past medical history, past social history and problem list.    Review of Systems   Constitutional: Positive for fatigue. Negative for appetite change, chills, fever and unexpected weight  change.   HENT: Negative for congestion, ear pain, rhinorrhea, sneezing and sore throat.    Eyes: Negative for visual disturbance.   Respiratory: Negative for cough, shortness of breath and wheezing.    Cardiovascular: Negative for chest pain, palpitations and leg swelling.   Gastrointestinal: Negative for abdominal pain, blood in stool, constipation, diarrhea, nausea and vomiting.   Genitourinary: Negative for dysuria and hematuria.   Musculoskeletal: Positive for arthralgias. Negative for back pain, joint swelling and myalgias.   Skin: Negative for rash.   Neurological: Negative for weakness, numbness and headaches.   Psychiatric/Behavioral: Negative for dysphoric mood, sleep disturbance and suicidal ideas. The patient is nervous/anxious.      Objective   Physical Exam  Constitutional:       General: She is not in acute distress.     Appearance: Normal appearance. She is well-developed. She is not diaphoretic.      Comments: Right tendon good spirits.  Gait somewhat cautious.  Required 1 person assistance to climb onto the exam table.  No apparent distress   HENT:      Head: Atraumatic.      Right Ear: Tympanic membrane, ear canal and external ear normal.      Left Ear: Tympanic membrane, ear canal and external ear normal.   Eyes:      Conjunctiva/sclera: Conjunctivae normal.   Neck:      Thyroid: No thyroid mass or thyromegaly.      Vascular: No carotid bruit or JVD.      Trachea: Trachea normal. No tracheal deviation.   Cardiovascular:      Rate and Rhythm: Normal rate and regular rhythm.      Heart sounds: Normal heart sounds, S1 normal and S2 normal. No murmur. No gallop.    Pulmonary:      Effort: Pulmonary effort is normal.      Breath sounds: Normal breath sounds.   Abdominal:      General: Bowel sounds are normal. There is no distension or abdominal bruit.      Palpations: Abdomen is soft. There is no hepatomegaly, splenomegaly or mass.      Tenderness: There is no abdominal tenderness.      Hernia: No  hernia is present.   Musculoskeletal:      Right lower leg: No edema.      Left lower leg: No edema.   Lymphadenopathy:      Head:      Right side of head: No submental, submandibular, tonsillar, preauricular, posterior auricular or occipital adenopathy.      Left side of head: No submental, submandibular, tonsillar, preauricular, posterior auricular or occipital adenopathy.      Cervical: No cervical adenopathy.      Upper Body:      Right upper body: No supraclavicular adenopathy.      Left upper body: No supraclavicular adenopathy.   Skin:     General: Skin is warm.      Coloration: Skin is not cyanotic, jaundiced or pale.      Findings: No rash.      Nails: There is no clubbing.     Neurological:      Mental Status: She is alert and oriented to person, place, and time.      Cranial Nerves: No cranial nerve deficit.      Motor: No tremor.      Coordination: Coordination normal.      Gait: Gait abnormal.   Psychiatric:         Speech: Speech normal.         Behavior: Behavior normal.         Thought Content: Thought content normal.       Assessment/Plan   Problems Addressed this Visit        Cardiovascular and Mediastinum    Essential hypertension   Hypertension: at goal. Evidence of target organ damage: none.  Encouraged to continue to work on diet and exercise plan.   Continue current medication       Digestive    Asymptomatic cholelithiasis    Morbid obesity (CMS/HCC)    Vitamin D deficiency  Continue supplementation with monitoring.       Endocrine    Hypothyroidism due to acquired atrophy of thyroid  Clinically euthyroid.  Continue current medication.  Encouraged to follow-up with previously scheduled labs       Musculoskeletal and Integument    Bilateral knee pain    Osteopenia  Encouraged to continue to pursue weight bearing activities while exercising joint protection.  Will arrange an updated DEXA scan    Relevant Orders    DEXA Bone Density Axial       Genitourinary    Menopausal symptom       Other     Healthcare maintenance  Recommended a flu shot  Reminded to follow-up with Shingrix.  Prescription emailed to her pharmacy  Will also arrange an updated mammogram   Relevant Medications   Zoster Vac Recomb Adjuvanted (Shingrix) 50 MCG/0.5ML reconstituted suspension   Other Relevant Orders   Fluarix/Fluzone/Afluria Quad>6 Months (Completed)   Mammo Screening Digital Tomosynthesis Bilateral With CAD   DEXA Bone Density Axial   Recurrent major depressive disorder, in partial remission (CMS/HCC)  Stable.  Supportive therapy.   Encouraged to report if any worse or if any new symptoms or concerns.

## 2020-09-23 VITALS
DIASTOLIC BLOOD PRESSURE: 65 MMHG | OXYGEN SATURATION: 91 % | RESPIRATION RATE: 14 BRPM | HEIGHT: 67 IN | HEART RATE: 93 BPM | SYSTOLIC BLOOD PRESSURE: 132 MMHG | BODY MASS INDEX: 35.31 KG/M2 | TEMPERATURE: 97.1 F | WEIGHT: 225 LBS

## 2020-09-28 DIAGNOSIS — G89.29 CHRONIC PAIN OF BOTH KNEES: ICD-10-CM

## 2020-09-28 DIAGNOSIS — M25.562 CHRONIC PAIN OF BOTH KNEES: ICD-10-CM

## 2020-09-28 DIAGNOSIS — N95.1 MENOPAUSAL SYMPTOM: ICD-10-CM

## 2020-09-28 DIAGNOSIS — E66.01 MORBID OBESITY (HCC): ICD-10-CM

## 2020-09-28 DIAGNOSIS — I10 ESSENTIAL HYPERTENSION: ICD-10-CM

## 2020-09-28 DIAGNOSIS — E03.4 HYPOTHYROIDISM DUE TO ACQUIRED ATROPHY OF THYROID: ICD-10-CM

## 2020-09-28 DIAGNOSIS — N95.2 ATROPHIC VAGINITIS: ICD-10-CM

## 2020-09-28 DIAGNOSIS — M25.561 CHRONIC PAIN OF BOTH KNEES: ICD-10-CM

## 2020-09-28 RX ORDER — LOSARTAN POTASSIUM 50 MG/1
50 TABLET ORAL DAILY
Qty: 30 TABLET | Refills: 5 | Status: SHIPPED | OUTPATIENT
Start: 2020-09-28 | End: 2021-03-22 | Stop reason: SDUPTHER

## 2020-09-28 RX ORDER — CHOLECALCIFEROL (VITAMIN D3) 125 MCG
500 CAPSULE ORAL DAILY
Qty: 30 TABLET | Refills: 5 | Status: SHIPPED | OUTPATIENT
Start: 2020-09-28

## 2020-09-28 RX ORDER — LEVOTHYROXINE SODIUM 112 MCG
112 TABLET ORAL DAILY
Qty: 30 TABLET | Refills: 5 | Status: SHIPPED | OUTPATIENT
Start: 2020-09-28 | End: 2021-03-22 | Stop reason: SDUPTHER

## 2020-09-28 RX ORDER — PHENTERMINE HYDROCHLORIDE 37.5 MG/1
37.5 CAPSULE ORAL EVERY MORNING
Qty: 30 CAPSULE | Refills: 3 | Status: SHIPPED | OUTPATIENT
Start: 2020-09-28 | End: 2021-03-22 | Stop reason: SDUPTHER

## 2020-09-28 RX ORDER — MELATONIN
1000 DAILY
Qty: 30 TABLET | Refills: 5 | Status: SHIPPED | OUTPATIENT
Start: 2020-09-28

## 2020-10-26 ENCOUNTER — LAB (OUTPATIENT)
Dept: FAMILY MEDICINE CLINIC | Facility: CLINIC | Age: 75
End: 2020-10-26

## 2020-10-26 DIAGNOSIS — E55.9 VITAMIN D DEFICIENCY: ICD-10-CM

## 2020-10-26 DIAGNOSIS — I10 ESSENTIAL HYPERTENSION: ICD-10-CM

## 2020-10-26 DIAGNOSIS — M85.80 OSTEOPENIA, UNSPECIFIED LOCATION: ICD-10-CM

## 2020-10-26 DIAGNOSIS — E03.4 HYPOTHYROIDISM DUE TO ACQUIRED ATROPHY OF THYROID: ICD-10-CM

## 2020-10-26 LAB
25(OH)D3 SERPL-MCNC: 63.3 NG/ML (ref 30–100)
ALBUMIN SERPL-MCNC: 4.2 G/DL (ref 3.5–5.2)
ALBUMIN/GLOB SERPL: 1.8 G/DL
ALP SERPL-CCNC: 107 U/L (ref 39–117)
ALT SERPL W P-5'-P-CCNC: 9 U/L (ref 1–33)
ANION GAP SERPL CALCULATED.3IONS-SCNC: 9.7 MMOL/L (ref 5–15)
AST SERPL-CCNC: 16 U/L (ref 1–32)
BASOPHILS # BLD AUTO: 0.09 10*3/MM3 (ref 0–0.2)
BASOPHILS NFR BLD AUTO: 1.6 % (ref 0–1.5)
BILIRUB SERPL-MCNC: 0.3 MG/DL (ref 0–1.2)
BUN SERPL-MCNC: 14 MG/DL (ref 8–23)
BUN/CREAT SERPL: 15.6 (ref 7–25)
CALCIUM SPEC-SCNC: 9.3 MG/DL (ref 8.6–10.5)
CHLORIDE SERPL-SCNC: 101 MMOL/L (ref 98–107)
CHOLEST SERPL-MCNC: 174 MG/DL (ref 0–200)
CO2 SERPL-SCNC: 25.3 MMOL/L (ref 22–29)
CREAT SERPL-MCNC: 0.9 MG/DL (ref 0.57–1)
DEPRECATED RDW RBC AUTO: 41.2 FL (ref 37–54)
EOSINOPHIL # BLD AUTO: 0.29 10*3/MM3 (ref 0–0.4)
EOSINOPHIL NFR BLD AUTO: 5.1 % (ref 0.3–6.2)
ERYTHROCYTE [DISTWIDTH] IN BLOOD BY AUTOMATED COUNT: 12.5 % (ref 12.3–15.4)
GFR SERPL CREATININE-BSD FRML MDRD: 61 ML/MIN/1.73
GLOBULIN UR ELPH-MCNC: 2.4 GM/DL
GLUCOSE SERPL-MCNC: 92 MG/DL (ref 65–99)
HCT VFR BLD AUTO: 37.2 % (ref 34–46.6)
HDLC SERPL-MCNC: 58 MG/DL (ref 40–60)
HGB BLD-MCNC: 12.6 G/DL (ref 12–15.9)
IMM GRANULOCYTES # BLD AUTO: 0.02 10*3/MM3 (ref 0–0.05)
IMM GRANULOCYTES NFR BLD AUTO: 0.3 % (ref 0–0.5)
LDLC SERPL CALC-MCNC: 102 MG/DL (ref 0–100)
LDLC/HDLC SERPL: 1.75 {RATIO}
LYMPHOCYTES # BLD AUTO: 1.61 10*3/MM3 (ref 0.7–3.1)
LYMPHOCYTES NFR BLD AUTO: 28.1 % (ref 19.6–45.3)
MCH RBC QN AUTO: 30.4 PG (ref 26.6–33)
MCHC RBC AUTO-ENTMCNC: 33.9 G/DL (ref 31.5–35.7)
MCV RBC AUTO: 89.9 FL (ref 79–97)
MONOCYTES # BLD AUTO: 0.45 10*3/MM3 (ref 0.1–0.9)
MONOCYTES NFR BLD AUTO: 7.9 % (ref 5–12)
NEUTROPHILS NFR BLD AUTO: 3.27 10*3/MM3 (ref 1.7–7)
NEUTROPHILS NFR BLD AUTO: 57 % (ref 42.7–76)
NRBC BLD AUTO-RTO: 0 /100 WBC (ref 0–0.2)
PLATELET # BLD AUTO: 380 10*3/MM3 (ref 140–450)
PMV BLD AUTO: 11 FL (ref 6–12)
POTASSIUM SERPL-SCNC: 4.1 MMOL/L (ref 3.5–5.2)
PROT SERPL-MCNC: 6.6 G/DL (ref 6–8.5)
RBC # BLD AUTO: 4.14 10*6/MM3 (ref 3.77–5.28)
SODIUM SERPL-SCNC: 136 MMOL/L (ref 136–145)
TRIGL SERPL-MCNC: 73 MG/DL (ref 0–150)
TSH SERPL DL<=0.05 MIU/L-ACNC: 5.97 UIU/ML (ref 0.27–4.2)
VLDLC SERPL-MCNC: 14 MG/DL (ref 5–40)
WBC # BLD AUTO: 5.73 10*3/MM3 (ref 3.4–10.8)

## 2020-10-26 PROCEDURE — 82306 VITAMIN D 25 HYDROXY: CPT | Performed by: GENERAL PRACTICE

## 2020-10-26 PROCEDURE — 80053 COMPREHEN METABOLIC PANEL: CPT | Performed by: GENERAL PRACTICE

## 2020-10-26 PROCEDURE — 85025 COMPLETE CBC W/AUTO DIFF WBC: CPT | Performed by: GENERAL PRACTICE

## 2020-10-26 PROCEDURE — 80061 LIPID PANEL: CPT | Performed by: GENERAL PRACTICE

## 2020-10-26 PROCEDURE — 84443 ASSAY THYROID STIM HORMONE: CPT | Performed by: GENERAL PRACTICE

## 2021-01-04 ENCOUNTER — APPOINTMENT (OUTPATIENT)
Dept: MAMMOGRAPHY | Facility: HOSPITAL | Age: 76
End: 2021-01-04

## 2021-01-04 ENCOUNTER — APPOINTMENT (OUTPATIENT)
Dept: BONE DENSITY | Facility: HOSPITAL | Age: 76
End: 2021-01-04

## 2021-03-22 ENCOUNTER — OFFICE VISIT (OUTPATIENT)
Dept: FAMILY MEDICINE CLINIC | Facility: CLINIC | Age: 76
End: 2021-03-22

## 2021-03-22 VITALS
RESPIRATION RATE: 14 BRPM | TEMPERATURE: 97.1 F | OXYGEN SATURATION: 96 % | HEIGHT: 67 IN | BODY MASS INDEX: 35.31 KG/M2 | SYSTOLIC BLOOD PRESSURE: 134 MMHG | DIASTOLIC BLOOD PRESSURE: 75 MMHG | HEART RATE: 94 BPM | WEIGHT: 225 LBS

## 2021-03-22 DIAGNOSIS — M85.80 OSTEOPENIA, UNSPECIFIED LOCATION: ICD-10-CM

## 2021-03-22 DIAGNOSIS — E66.01 MORBID OBESITY (HCC): ICD-10-CM

## 2021-03-22 DIAGNOSIS — N95.2 ATROPHIC VAGINITIS: ICD-10-CM

## 2021-03-22 DIAGNOSIS — F43.21 GRIEF REACTION: ICD-10-CM

## 2021-03-22 DIAGNOSIS — K80.20 ASYMPTOMATIC CHOLELITHIASIS: ICD-10-CM

## 2021-03-22 DIAGNOSIS — E55.9 VITAMIN D DEFICIENCY: ICD-10-CM

## 2021-03-22 DIAGNOSIS — G89.29 CHRONIC PAIN OF BOTH KNEES: ICD-10-CM

## 2021-03-22 DIAGNOSIS — R30.0 DYSURIA: ICD-10-CM

## 2021-03-22 DIAGNOSIS — N95.1 MENOPAUSAL SYMPTOM: ICD-10-CM

## 2021-03-22 DIAGNOSIS — I10 ESSENTIAL HYPERTENSION: Primary | ICD-10-CM

## 2021-03-22 DIAGNOSIS — E03.4 HYPOTHYROIDISM DUE TO ACQUIRED ATROPHY OF THYROID: ICD-10-CM

## 2021-03-22 DIAGNOSIS — M25.561 CHRONIC PAIN OF BOTH KNEES: ICD-10-CM

## 2021-03-22 DIAGNOSIS — M25.562 CHRONIC PAIN OF BOTH KNEES: ICD-10-CM

## 2021-03-22 DIAGNOSIS — Z00.00 HEALTHCARE MAINTENANCE: ICD-10-CM

## 2021-03-22 DIAGNOSIS — F33.41 RECURRENT MAJOR DEPRESSIVE DISORDER, IN PARTIAL REMISSION (HCC): ICD-10-CM

## 2021-03-22 PROBLEM — F43.20 GRIEF REACTION: Status: ACTIVE | Noted: 2021-03-22

## 2021-03-22 LAB
BILIRUB BLD-MCNC: NEGATIVE MG/DL
CLARITY, POC: CLEAR
COLOR UR: YELLOW
GLUCOSE UR STRIP-MCNC: NEGATIVE MG/DL
KETONES UR QL: NEGATIVE
LEUKOCYTE EST, POC: NEGATIVE
NITRITE UR-MCNC: NEGATIVE MG/ML
PH UR: 6 [PH] (ref 5–8)
PROT UR STRIP-MCNC: NEGATIVE MG/DL
RBC # UR STRIP: NEGATIVE /UL
SP GR UR: 1.01 (ref 1–1.03)
UROBILINOGEN UR QL: NORMAL

## 2021-03-22 PROCEDURE — 81003 URINALYSIS AUTO W/O SCOPE: CPT | Performed by: GENERAL PRACTICE

## 2021-03-22 PROCEDURE — 99214 OFFICE O/P EST MOD 30 MIN: CPT | Performed by: GENERAL PRACTICE

## 2021-03-22 RX ORDER — PHENTERMINE HYDROCHLORIDE 37.5 MG/1
37.5 CAPSULE ORAL EVERY MORNING
Qty: 30 CAPSULE | Refills: 3 | Status: SHIPPED | OUTPATIENT
Start: 2021-03-22 | End: 2021-07-22 | Stop reason: SDUPTHER

## 2021-03-22 RX ORDER — LOSARTAN POTASSIUM 50 MG/1
50 TABLET ORAL DAILY
Qty: 30 TABLET | Refills: 5 | Status: SHIPPED | OUTPATIENT
Start: 2021-03-22 | End: 2021-07-22 | Stop reason: SDUPTHER

## 2021-03-22 RX ORDER — LEVOTHYROXINE SODIUM 112 MCG
112 TABLET ORAL DAILY
Qty: 30 TABLET | Refills: 5 | Status: SHIPPED | OUTPATIENT
Start: 2021-03-22 | End: 2021-07-22 | Stop reason: SDUPTHER

## 2021-03-22 NOTE — PROGRESS NOTES
Subjective   Tennille Gasca is a 76 y.o. female.     History of Present Illness     Essential Hypertension  She taking losartan 50 nightly with good control of her blood pressure at home and no apparent side effects. She continues to deny any chest pain, palpitations, lightheadedness, shortness of breath, or swelling of the ankles, and has had no changes in her vision, strength, or sensation  Lab Results   Component Value Date    GLUCOSE 92 10/26/2020    BUN 14 10/26/2020    CREATININE 0.90 10/26/2020    EGFRIFNONA 61 10/26/2020    BCR 15.6 10/26/2020    K 4.1 10/26/2020    CO2 25.3 10/26/2020    CALCIUM 9.3 10/26/2020    ALBUMIN 4.20 10/26/2020    LABIL2 1.5 2016    AST 16 10/26/2020    ALT 9 10/26/2020     Lab Results   Component Value Date    CHOL 174 10/26/2020    CHLPL 197 2016    TRIG 73 10/26/2020    HDL 58 10/26/2020     (H) 10/26/2020     Hypothyroidism  Patient returns for evaluation of thyroid function. Symptoms consist of fatigue, and weight gain. The symptoms are mild. Previous thyroid studies include TSH. The hypothyroidism is due to Hashimoto's disease. She remains on levothyroxine 112 daily.    Lab Results   Component Value Date    TSH 5.970 (H) 10/26/2020     Depression  Her brother  suddenly since he was last here.  This has been difficult and she admits to intermittent depression, nervousness, and worrying.  She continues to deny any anhedonia, difficulty concentrating, impaired memory,  or suicidal thoughts.  With melatonin she continues to average 7-8 hours of sleep nightly.    Knee Pain  She continues to have intermittent knee pain as previously described. There has been no change in the quality nor any new associated symptoms.  Current symptoms include pain located anteriorly and stiffness. Pain is aggravated by rising after sitting and prolonged weightbearing. Patient has had no prior knee problems. Evaluation to date: none. Treatment to date: She has tried OTC topical  diclofenac with a significant improvement and no apparent side effects    Labs  Most recent vitamin D 63.3    The following portions of the patient's history were reviewed and updated as appropriate: allergies, current medications, past medical history, past social history and problem list.    Review of Systems   Constitutional: Positive for fatigue. Negative for appetite change, chills, fever and unexpected weight change.   HENT: Negative for congestion, ear pain, rhinorrhea, sneezing and sore throat.    Eyes: Negative for visual disturbance.   Respiratory: Negative for cough, shortness of breath and wheezing.    Cardiovascular: Negative for chest pain, palpitations and leg swelling.   Gastrointestinal: Negative for abdominal pain, blood in stool, constipation, diarrhea, nausea and vomiting.   Genitourinary: Negative for dysuria and hematuria.   Musculoskeletal: Positive for arthralgias. Negative for back pain, joint swelling and myalgias.   Skin: Negative for rash.   Neurological: Negative for weakness, numbness and headaches.   Psychiatric/Behavioral: Positive for dysphoric mood. Negative for sleep disturbance and suicidal ideas. The patient is nervous/anxious.      Objective   Physical Exam  Constitutional:       General: She is not in acute distress.     Appearance: Normal appearance. She is well-developed. She is not diaphoretic.      Comments: Alert and in fair spirits. No apparent distress. No pallor, jaundice, diaphoresis, or cyanosis.     HENT:      Head: Atraumatic.      Right Ear: Tympanic membrane, ear canal and external ear normal.      Left Ear: Tympanic membrane, ear canal and external ear normal.   Eyes:      Conjunctiva/sclera: Conjunctivae normal.   Neck:      Thyroid: No thyroid mass or thyromegaly.      Vascular: No carotid bruit or JVD.      Trachea: Trachea normal. No tracheal deviation.   Cardiovascular:      Rate and Rhythm: Normal rate and regular rhythm.      Heart sounds: Normal heart  sounds, S1 normal and S2 normal. No murmur heard.   No gallop.    Pulmonary:      Effort: Pulmonary effort is normal.      Breath sounds: Normal breath sounds.   Abdominal:      General: Bowel sounds are normal. There is no distension.   Musculoskeletal:      Right lower leg: No edema.      Left lower leg: No edema.   Lymphadenopathy:      Head:      Right side of head: No submental, submandibular, tonsillar, preauricular, posterior auricular or occipital adenopathy.      Left side of head: No submental, submandibular, tonsillar, preauricular, posterior auricular or occipital adenopathy.      Cervical: No cervical adenopathy.      Upper Body:      Right upper body: No supraclavicular adenopathy.      Left upper body: No supraclavicular adenopathy.   Skin:     General: Skin is warm.      Coloration: Skin is not cyanotic, jaundiced or pale.      Findings: No rash.      Nails: There is no clubbing.   Neurological:      Mental Status: She is alert and oriented to person, place, and time.      Cranial Nerves: No cranial nerve deficit.      Motor: No tremor.      Coordination: Coordination normal.      Gait: Gait normal.   Psychiatric:         Attention and Perception: Attention normal.         Mood and Affect: Mood is depressed (when discussing her brother's death however also smiled when appropriate).         Speech: Speech normal.         Behavior: Behavior normal.         Thought Content: Thought content normal. Thought content does not include suicidal ideation.       Assessment/Plan   Problems Addressed this Visit        Cardiac and Vasculature    Essential hypertension   Hypertension: marginal. Evidence of target organ damage: none.  Encouraged to continue to work on diet and exercise plan.   Continue current medication  If blood pressure remains elevated at her return we will consider titrating losartan further    Relevant Medications    losartan (COZAAR) 50 MG tablet       Endocrine and Metabolic     Hypothyroidism due to acquired atrophy of thyroid  Clinically euthyroid.  Continue current medication.    Relevant Medications    Synthroid 112 MCG tablet    Morbid obesity (CMS/HCC)  Encouraged to continue to work on her diet and exercise plan.  Continue current medication    Relevant Medications    phentermine 37.5 MG capsule    Vitamin D deficiency       Gastrointestinal Abdominal     Asymptomatic cholelithiasis       Genitourinary and Reproductive     Atrophic vaginitis    Relevant Medications    conjugated estrogens (PREMARIN) 0.625 MG/GM vaginal cream    Menopausal symptom    Relevant Medications    conjugated estrogens (PREMARIN) 0.625 MG/GM vaginal cream       Health Encounters    Healthcare maintenance  Patient has received both doses of the COVID-19 vaccine.  Reminded to follow-up with scheduled mammogram and DEXA scan  We will discuss Shingrix again at her return       Mental Health    Grief reaction  Supportive therapy  Encouraged to report if any worse or if any new symptoms or concerns.    Recurrent major depressive disorder, in partial remission (CMS/HCC)    Relevant Medications    phentermine 37.5 MG capsule       Musculoskeletal and Injuries    Bilateral knee pain  Reminded regarding symptomatic treatment.   Continue current medication    Relevant Medications    Diclofenac Sodium (VOLTAREN) 1 % gel gel    Osteopenia         Diagnoses       Codes Comments    Essential hypertension    -  Primary ICD-10-CM: I10  ICD-9-CM: 401.9     Vitamin D deficiency     ICD-10-CM: E55.9  ICD-9-CM: 268.9     Morbid obesity (CMS/HCC)     ICD-10-CM: E66.01  ICD-9-CM: 278.01     Hypothyroidism due to acquired atrophy of thyroid     ICD-10-CM: E03.4  ICD-9-CM: 244.8, 246.8     Asymptomatic cholelithiasis     ICD-10-CM: K80.20  ICD-9-CM: 574.20     Menopausal symptom     ICD-10-CM: N95.1  ICD-9-CM: 627.2     Healthcare maintenance     ICD-10-CM: Z00.00  ICD-9-CM: V70.0     Recurrent major depressive disorder, in partial  remission (CMS/[facility])     ICD-10-CM: F33.41  ICD-9-CM: 296.35     Osteopenia, unspecified location     ICD-10-CM: M85.80  ICD-9-CM: 733.90     Chronic pain of both knees     ICD-10-CM: M25.561, M25.562, G89.29  ICD-9-CM: 719.46, 338.29     Atrophic vaginitis     ICD-10-CM: N95.2  ICD-9-CM: 627.3     Dysuria     ICD-10-CM: R30.0  ICD-9-CM: 788.1     Grief reaction     ICD-10-CM: F43.21  ICD-9-CM: 309.0

## 2021-04-16 ENCOUNTER — APPOINTMENT (OUTPATIENT)
Dept: MAMMOGRAPHY | Facility: HOSPITAL | Age: 76
End: 2021-04-16

## 2021-05-14 ENCOUNTER — HOSPITAL ENCOUNTER (OUTPATIENT)
Dept: BONE DENSITY | Facility: HOSPITAL | Age: 76
Discharge: HOME | End: 2021-05-14

## 2021-05-14 ENCOUNTER — HOSPITAL ENCOUNTER (OUTPATIENT)
Dept: MAMMOGRAPHY | Facility: HOSPITAL | Age: 76
Discharge: HOME | End: 2021-05-14

## 2021-05-14 DIAGNOSIS — M85.80 OSTEOPENIA, UNSPECIFIED LOCATION: ICD-10-CM

## 2021-05-14 DIAGNOSIS — Z12.31 ENCOUNTER FOR SCREENING MAMMOGRAM FOR BREAST CANCER: ICD-10-CM

## 2021-05-14 DIAGNOSIS — Z00.00 HEALTHCARE MAINTENANCE: ICD-10-CM

## 2021-05-14 DIAGNOSIS — M81.0 AGE-RELATED OSTEOPOROSIS WITHOUT CURRENT PATHOLOGICAL FRACTURE: ICD-10-CM

## 2021-05-14 PROCEDURE — 77080 DXA BONE DENSITY AXIAL: CPT | Performed by: RADIOLOGY

## 2021-05-14 PROCEDURE — 77080 DXA BONE DENSITY AXIAL: CPT

## 2021-05-14 PROCEDURE — 77067 SCR MAMMO BI INCL CAD: CPT

## 2021-05-14 PROCEDURE — 77063 BREAST TOMOSYNTHESIS BI: CPT

## 2021-05-17 PROCEDURE — 77063 BREAST TOMOSYNTHESIS BI: CPT | Performed by: RADIOLOGY

## 2021-05-17 PROCEDURE — 77067 SCR MAMMO BI INCL CAD: CPT | Performed by: RADIOLOGY

## 2021-07-22 ENCOUNTER — OFFICE VISIT (OUTPATIENT)
Dept: FAMILY MEDICINE CLINIC | Facility: CLINIC | Age: 76
End: 2021-07-22

## 2021-07-22 VITALS
RESPIRATION RATE: 14 BRPM | SYSTOLIC BLOOD PRESSURE: 138 MMHG | HEART RATE: 92 BPM | DIASTOLIC BLOOD PRESSURE: 72 MMHG | WEIGHT: 230 LBS | TEMPERATURE: 97.1 F | BODY MASS INDEX: 36.1 KG/M2 | HEIGHT: 67 IN | OXYGEN SATURATION: 98 %

## 2021-07-22 DIAGNOSIS — N95.1 MENOPAUSAL SYMPTOM: ICD-10-CM

## 2021-07-22 DIAGNOSIS — Z00.00 HEALTHCARE MAINTENANCE: ICD-10-CM

## 2021-07-22 DIAGNOSIS — M25.562 CHRONIC PAIN OF BOTH KNEES: ICD-10-CM

## 2021-07-22 DIAGNOSIS — I10 ESSENTIAL HYPERTENSION: Primary | ICD-10-CM

## 2021-07-22 DIAGNOSIS — N95.2 ATROPHIC VAGINITIS: ICD-10-CM

## 2021-07-22 DIAGNOSIS — F33.41 RECURRENT MAJOR DEPRESSIVE DISORDER, IN PARTIAL REMISSION (HCC): ICD-10-CM

## 2021-07-22 DIAGNOSIS — E55.9 VITAMIN D DEFICIENCY: ICD-10-CM

## 2021-07-22 DIAGNOSIS — M25.561 CHRONIC PAIN OF BOTH KNEES: ICD-10-CM

## 2021-07-22 DIAGNOSIS — F43.21 GRIEF REACTION: ICD-10-CM

## 2021-07-22 DIAGNOSIS — G89.29 CHRONIC PAIN OF BOTH KNEES: ICD-10-CM

## 2021-07-22 DIAGNOSIS — E03.4 HYPOTHYROIDISM DUE TO ACQUIRED ATROPHY OF THYROID: ICD-10-CM

## 2021-07-22 DIAGNOSIS — E66.01 MORBID OBESITY (HCC): ICD-10-CM

## 2021-07-22 DIAGNOSIS — M85.80 OSTEOPENIA, UNSPECIFIED LOCATION: ICD-10-CM

## 2021-07-22 PROCEDURE — 99214 OFFICE O/P EST MOD 30 MIN: CPT | Performed by: GENERAL PRACTICE

## 2021-07-22 RX ORDER — LOSARTAN POTASSIUM 50 MG/1
50 TABLET ORAL DAILY
Qty: 30 TABLET | Refills: 5 | Status: SHIPPED | OUTPATIENT
Start: 2021-07-22 | End: 2022-01-22 | Stop reason: SDUPTHER

## 2021-07-22 RX ORDER — LEVOTHYROXINE SODIUM 112 MCG
112 TABLET ORAL DAILY
Qty: 30 TABLET | Refills: 5 | Status: SHIPPED | OUTPATIENT
Start: 2021-07-22 | End: 2022-01-22 | Stop reason: SDUPTHER

## 2021-07-22 RX ORDER — PHENTERMINE HYDROCHLORIDE 37.5 MG/1
37.5 CAPSULE ORAL EVERY MORNING
Qty: 30 CAPSULE | Refills: 5 | Status: SHIPPED | OUTPATIENT
Start: 2021-07-22 | End: 2022-01-22 | Stop reason: SDUPTHER

## 2021-07-22 NOTE — PROGRESS NOTES
Subjective   Tennille Gasca is a 76 y.o. female.     Chief Complaint  She returns for a scheduled reassessment of multiple medical problems including hypertension, hypothyroidism, and knee pain    History of Present Illness     Essential Hypertension  She remains on losartan 50 nightly with good control of her blood pressure at home and no apparent side effects. She continues to deny any chest pain, palpitations, lightheadedness, shortness of breath, or swelling of the ankles, and has had no changes in her vision, strength, or sensation    Hypothyroidism  Patient returns for evaluation of thyroid function. Symptoms consist of fatigue, and weight gain. The symptoms are mild. Previous thyroid studies include TSH. The hypothyroidism is due to Hashimoto's disease. She remains on levothyroxine 112 daily.      Knee Pain  She continues to have intermittent knee pain as previously described. There has been no change in the quality nor any new associated symptoms.  Current symptoms include pain located anteriorly and stiffness. Pain is aggravated by prolonged weightbearing and with ladders. Patient has had no prior knee problems. Evaluation to date: none. Treatment to date: She has tried OTC topical diclofenac with a significant improvement and no apparent side effects    Depression  Her brother  suddenly late last year.  This has been difficult and she admits to intermittent depression, nervousness, and worrying.  She feels her symptoms are improving and continues to deny any anhedonia, difficulty concentrating, impaired memory,  or suicidal thoughts.  With melatonin she continues to average 7-8 hours of sleep nightly.    Imaging  DEXA performed on 21 returned with a T score as low as -2    The following portions of the patient's history were reviewed and updated as appropriate: allergies, current medications, past medical history, past social history and problem list.    Review of Systems   Constitutional:  Positive for fatigue. Negative for appetite change, chills, fever and unexpected weight change.   HENT: Negative for congestion, ear pain, rhinorrhea, sneezing and sore throat.    Eyes: Negative for visual disturbance.   Respiratory: Negative for cough, shortness of breath and wheezing.    Cardiovascular: Negative for chest pain, palpitations and leg swelling.   Gastrointestinal: Negative for abdominal pain, blood in stool, constipation, diarrhea, nausea and vomiting.   Genitourinary: Negative for dysuria and hematuria.   Musculoskeletal: Positive for arthralgias. Negative for back pain, joint swelling and myalgias.   Skin: Negative for rash.   Neurological: Negative for weakness, numbness and headaches.   Psychiatric/Behavioral: Positive for dysphoric mood. Negative for sleep disturbance and suicidal ideas. The patient is nervous/anxious.      Objective   Physical Exam  Constitutional:       General: She is not in acute distress.     Appearance: Normal appearance. She is well-developed. She is not diaphoretic.      Comments: Bright and in good spirits. No apparent distress. No pallor, jaundice, diaphoresis, or cyanosis.     HENT:      Head: Atraumatic.      Right Ear: Tympanic membrane, ear canal and external ear normal.      Left Ear: Tympanic membrane, ear canal and external ear normal.   Eyes:      Conjunctiva/sclera: Conjunctivae normal.   Neck:      Thyroid: No thyroid mass or thyromegaly.      Vascular: No carotid bruit or JVD.      Trachea: Trachea normal. No tracheal deviation.   Cardiovascular:      Rate and Rhythm: Normal rate and regular rhythm.      Heart sounds: Normal heart sounds, S1 normal and S2 normal. No murmur heard.   No gallop.    Pulmonary:      Effort: Pulmonary effort is normal.      Breath sounds: Normal breath sounds.   Abdominal:      General: Bowel sounds are normal. There is no distension.   Musculoskeletal:      Right lower leg: No edema.      Left lower leg: No edema.    Lymphadenopathy:      Head:      Right side of head: No submental, submandibular, tonsillar, preauricular, posterior auricular or occipital adenopathy.      Left side of head: No submental, submandibular, tonsillar, preauricular, posterior auricular or occipital adenopathy.      Cervical: No cervical adenopathy.      Upper Body:      Right upper body: No supraclavicular adenopathy.      Left upper body: No supraclavicular adenopathy.   Skin:     General: Skin is warm.      Coloration: Skin is not cyanotic, jaundiced or pale.      Findings: No rash.      Nails: There is no clubbing.   Neurological:      Mental Status: She is alert and oriented to person, place, and time.      Cranial Nerves: No cranial nerve deficit.      Motor: No tremor.      Coordination: Coordination normal.      Gait: Gait normal.   Psychiatric:         Attention and Perception: Attention normal.         Mood and Affect: Mood normal.         Speech: Speech normal.         Behavior: Behavior normal.         Thought Content: Thought content normal.       Assessment/Plan   Problems Addressed this Visit        Cardiac and Vasculature    Essential hypertension   Hypertension: BP acceptable. Evidence of target organ damage: none.  Encouraged to continue to work on diet and exercise plan.   Continue current medication   Scheduled for updated labs     Relevant Medications    losartan (COZAAR) 50 MG tablet    Other Relevant Orders    CBC & Differential    Comprehensive Metabolic Panel    Lipid Panel    TSH       Endocrine and Metabolic    Hypothyroidism due to acquired atrophy of thyroid  Clinically euthyroid.  Continue current medication.    Relevant Medications    Synthroid 112 MCG tablet    Other Relevant Orders    TSH    Morbid obesity (CMS/HCC)  As above.  Continue current medication    Relevant Medications    phentermine 37.5 MG capsule    Vitamin D deficiency  Continue supplementation with monitoring.    Relevant Orders    Vitamin D 25 Hydroxy        Genitourinary and Reproductive     Atrophic vaginitis    Relevant Medications    conjugated estrogens (PREMARIN) 0.625 MG/GM vaginal cream (Start on 7/23/2021)    Menopausal symptom    Relevant Medications    conjugated estrogens (PREMARIN) 0.625 MG/GM vaginal cream (Start on 7/23/2021)       Health Encounters    Healthcare maintenance  Patient has received both doses of the COVID-19 vaccine.  Recommended a flu shot when available  Reminded that she is also due for Shingrix.  Prescription emailed to her pharmacy    Relevant Medications    Zoster Vac Recomb Adjuvanted (Shingrix) 50 MCG/0.5ML reconstituted suspension       Mental Health    Grief reaction  Doing better  Supportive therapy  Encouraged to report if any worse or if any new symptoms or concerns.    Recurrent major depressive disorder, in partial remission (CMS/HCC)    As above.           Musculoskeletal and Injuries    Bilateral knee pain  Reminded regarding symptomatic treatment.   Continue current medication    Relevant Medications    Diclofenac Sodium (VOLTAREN) 1 % gel gel    Osteopenia  Encouraged to continue to pursue weight bearing activities while exercising joint protection.  Will continue to monitor      Diagnoses       Codes Comments    Essential hypertension    -  Primary ICD-10-CM: I10  ICD-9-CM: 401.9     Vitamin D deficiency     ICD-10-CM: E55.9  ICD-9-CM: 268.9     Morbid obesity (CMS/HCC)     ICD-10-CM: E66.01  ICD-9-CM: 278.01     Hypothyroidism due to acquired atrophy of thyroid     ICD-10-CM: E03.4  ICD-9-CM: 244.8, 246.8     Menopausal symptom     ICD-10-CM: N95.1  ICD-9-CM: 627.2     Healthcare maintenance     ICD-10-CM: Z00.00  ICD-9-CM: V70.0     Recurrent major depressive disorder, in partial remission (CMS/HCC)     ICD-10-CM: F33.41  ICD-9-CM: 296.35     Osteopenia, unspecified location     ICD-10-CM: M85.80  ICD-9-CM: 733.90     Chronic pain of both knees     ICD-10-CM: M25.561, M25.562, G89.29  ICD-9-CM: 719.46, 338.29      Atrophic vaginitis     ICD-10-CM: N95.2  ICD-9-CM: 627.3     Grief reaction     ICD-10-CM: F43.21  ICD-9-CM: 309.0

## 2021-09-28 ENCOUNTER — TRANSCRIBE ORDERS (OUTPATIENT)
Dept: ADMINISTRATIVE | Facility: HOSPITAL | Age: 76
End: 2021-09-28

## 2021-09-28 DIAGNOSIS — Z01.818 OTHER SPECIFIED PRE-OPERATIVE EXAMINATION: Primary | ICD-10-CM

## 2021-09-29 ENCOUNTER — LAB (OUTPATIENT)
Dept: FAMILY MEDICINE CLINIC | Facility: CLINIC | Age: 76
End: 2021-09-29

## 2021-09-29 ENCOUNTER — CLINICAL SUPPORT (OUTPATIENT)
Dept: FAMILY MEDICINE CLINIC | Facility: CLINIC | Age: 76
End: 2021-09-29

## 2021-09-29 DIAGNOSIS — E03.4 HYPOTHYROIDISM DUE TO ACQUIRED ATROPHY OF THYROID: ICD-10-CM

## 2021-09-29 DIAGNOSIS — I10 ESSENTIAL HYPERTENSION: ICD-10-CM

## 2021-09-29 DIAGNOSIS — Z23 ENCOUNTER FOR IMMUNIZATION: ICD-10-CM

## 2021-09-29 DIAGNOSIS — E55.9 VITAMIN D DEFICIENCY: ICD-10-CM

## 2021-09-29 DIAGNOSIS — Z00.00 HEALTHCARE MAINTENANCE: Primary | ICD-10-CM

## 2021-09-29 LAB
BASOPHILS # BLD AUTO: 0.09 10*3/MM3 (ref 0–0.2)
BASOPHILS NFR BLD AUTO: 1.5 % (ref 0–1.5)
DEPRECATED RDW RBC AUTO: 43.9 FL (ref 37–54)
EOSINOPHIL # BLD AUTO: 0.28 10*3/MM3 (ref 0–0.4)
EOSINOPHIL NFR BLD AUTO: 4.7 % (ref 0.3–6.2)
ERYTHROCYTE [DISTWIDTH] IN BLOOD BY AUTOMATED COUNT: 12.9 % (ref 12.3–15.4)
HCT VFR BLD AUTO: 39 % (ref 34–46.6)
HGB BLD-MCNC: 12.5 G/DL (ref 12–15.9)
IMM GRANULOCYTES # BLD AUTO: 0.02 10*3/MM3 (ref 0–0.05)
IMM GRANULOCYTES NFR BLD AUTO: 0.3 % (ref 0–0.5)
LYMPHOCYTES # BLD AUTO: 1.46 10*3/MM3 (ref 0.7–3.1)
LYMPHOCYTES NFR BLD AUTO: 24.7 % (ref 19.6–45.3)
MCH RBC QN AUTO: 29.7 PG (ref 26.6–33)
MCHC RBC AUTO-ENTMCNC: 32.1 G/DL (ref 31.5–35.7)
MCV RBC AUTO: 92.6 FL (ref 79–97)
MONOCYTES # BLD AUTO: 0.54 10*3/MM3 (ref 0.1–0.9)
MONOCYTES NFR BLD AUTO: 9.2 % (ref 5–12)
NEUTROPHILS NFR BLD AUTO: 3.51 10*3/MM3 (ref 1.7–7)
NEUTROPHILS NFR BLD AUTO: 59.6 % (ref 42.7–76)
NRBC BLD AUTO-RTO: 0 /100 WBC (ref 0–0.2)
PLATELET # BLD AUTO: 409 10*3/MM3 (ref 140–450)
PMV BLD AUTO: 10.9 FL (ref 6–12)
RBC # BLD AUTO: 4.21 10*6/MM3 (ref 3.77–5.28)
WBC # BLD AUTO: 5.9 10*3/MM3 (ref 3.4–10.8)

## 2021-09-29 PROCEDURE — 85025 COMPLETE CBC W/AUTO DIFF WBC: CPT | Performed by: GENERAL PRACTICE

## 2021-09-29 PROCEDURE — 80053 COMPREHEN METABOLIC PANEL: CPT | Performed by: GENERAL PRACTICE

## 2021-09-29 PROCEDURE — 80061 LIPID PANEL: CPT | Performed by: GENERAL PRACTICE

## 2021-09-29 PROCEDURE — G0008 ADMIN INFLUENZA VIRUS VAC: HCPCS | Performed by: GENERAL PRACTICE

## 2021-09-29 PROCEDURE — 90662 IIV NO PRSV INCREASED AG IM: CPT | Performed by: GENERAL PRACTICE

## 2021-09-29 PROCEDURE — 84443 ASSAY THYROID STIM HORMONE: CPT | Performed by: GENERAL PRACTICE

## 2021-09-29 PROCEDURE — 82306 VITAMIN D 25 HYDROXY: CPT | Performed by: GENERAL PRACTICE

## 2021-09-30 LAB
25(OH)D3 SERPL-MCNC: 56.7 NG/ML (ref 30–100)
ALBUMIN SERPL-MCNC: 4.4 G/DL (ref 3.5–5.2)
ALBUMIN/GLOB SERPL: 1.5 G/DL
ALP SERPL-CCNC: 95 U/L (ref 39–117)
ALT SERPL W P-5'-P-CCNC: 14 U/L (ref 1–33)
ANION GAP SERPL CALCULATED.3IONS-SCNC: 10.4 MMOL/L (ref 5–15)
AST SERPL-CCNC: 20 U/L (ref 1–32)
BILIRUB SERPL-MCNC: 0.4 MG/DL (ref 0–1.2)
BUN SERPL-MCNC: 17 MG/DL (ref 8–23)
BUN/CREAT SERPL: 15.9 (ref 7–25)
CALCIUM SPEC-SCNC: 9.5 MG/DL (ref 8.6–10.5)
CHLORIDE SERPL-SCNC: 103 MMOL/L (ref 98–107)
CHOLEST SERPL-MCNC: 180 MG/DL (ref 0–200)
CO2 SERPL-SCNC: 23.6 MMOL/L (ref 22–29)
CREAT SERPL-MCNC: 1.07 MG/DL (ref 0.57–1)
GFR SERPL CREATININE-BSD FRML MDRD: 50 ML/MIN/1.73
GLOBULIN UR ELPH-MCNC: 3 GM/DL
GLUCOSE SERPL-MCNC: 83 MG/DL (ref 65–99)
HDLC SERPL-MCNC: 50 MG/DL (ref 40–60)
LDLC SERPL CALC-MCNC: 117 MG/DL (ref 0–100)
LDLC/HDLC SERPL: 2.32 {RATIO}
POTASSIUM SERPL-SCNC: 5.3 MMOL/L (ref 3.5–5.2)
PROT SERPL-MCNC: 7.4 G/DL (ref 6–8.5)
SODIUM SERPL-SCNC: 137 MMOL/L (ref 136–145)
TRIGL SERPL-MCNC: 71 MG/DL (ref 0–150)
TSH SERPL DL<=0.05 MIU/L-ACNC: 4.01 UIU/ML (ref 0.27–4.2)
VLDLC SERPL-MCNC: 13 MG/DL (ref 5–40)

## 2021-10-01 ENCOUNTER — LAB (OUTPATIENT)
Dept: LAB | Facility: HOSPITAL | Age: 76
End: 2021-10-01

## 2021-10-01 DIAGNOSIS — Z01.818 OTHER SPECIFIED PRE-OPERATIVE EXAMINATION: ICD-10-CM

## 2021-10-01 LAB — SARS-COV-2 RNA PNL SPEC NAA+PROBE: NOT DETECTED

## 2021-10-01 PROCEDURE — C9803 HOPD COVID-19 SPEC COLLECT: HCPCS

## 2021-10-01 PROCEDURE — U0004 COV-19 TEST NON-CDC HGH THRU: HCPCS

## 2021-10-07 ENCOUNTER — OFFICE VISIT (OUTPATIENT)
Dept: FAMILY MEDICINE CLINIC | Facility: CLINIC | Age: 76
End: 2021-10-07

## 2021-10-07 VITALS
BODY MASS INDEX: 37.12 KG/M2 | HEIGHT: 66 IN | DIASTOLIC BLOOD PRESSURE: 80 MMHG | TEMPERATURE: 96.4 F | OXYGEN SATURATION: 94 % | HEART RATE: 86 BPM | SYSTOLIC BLOOD PRESSURE: 140 MMHG | WEIGHT: 231 LBS

## 2021-10-07 DIAGNOSIS — B37.31 VAGINA, CANDIDIASIS: ICD-10-CM

## 2021-10-07 DIAGNOSIS — R30.0 DYSURIA: Primary | ICD-10-CM

## 2021-10-07 LAB
BILIRUB BLD-MCNC: NEGATIVE MG/DL
CLARITY, POC: CLEAR
COLOR UR: YELLOW
GLUCOSE UR STRIP-MCNC: NEGATIVE MG/DL
KETONES UR QL: NEGATIVE
LEUKOCYTE EST, POC: ABNORMAL
NITRITE UR-MCNC: NEGATIVE MG/ML
PH UR: 6 [PH] (ref 5–8)
PROT UR STRIP-MCNC: NEGATIVE MG/DL
RBC # UR STRIP: ABNORMAL /UL
SP GR UR: 1.01 (ref 1–1.03)
UROBILINOGEN UR QL: NORMAL

## 2021-10-07 PROCEDURE — 81003 URINALYSIS AUTO W/O SCOPE: CPT | Performed by: NURSE PRACTITIONER

## 2021-10-07 PROCEDURE — 99213 OFFICE O/P EST LOW 20 MIN: CPT | Performed by: NURSE PRACTITIONER

## 2021-10-07 PROCEDURE — 87186 SC STD MICRODIL/AGAR DIL: CPT | Performed by: NURSE PRACTITIONER

## 2021-10-07 PROCEDURE — 87086 URINE CULTURE/COLONY COUNT: CPT | Performed by: NURSE PRACTITIONER

## 2021-10-07 PROCEDURE — 87077 CULTURE AEROBIC IDENTIFY: CPT | Performed by: NURSE PRACTITIONER

## 2021-10-07 RX ORDER — FLUCONAZOLE 150 MG/1
150 TABLET ORAL DAILY
Qty: 3 TABLET | Refills: 0 | Status: SHIPPED | OUTPATIENT
Start: 2021-10-07 | End: 2021-10-10

## 2021-10-07 RX ORDER — CIPROFLOXACIN 500 MG/1
500 TABLET, FILM COATED ORAL 2 TIMES DAILY
Qty: 14 TABLET | Refills: 0 | Status: SHIPPED | OUTPATIENT
Start: 2021-10-07 | End: 2021-10-14

## 2021-10-07 NOTE — PROGRESS NOTES
Chief Complaint  Urinary Tract Infection and Vaginal Itching    Subjective          Tennille Gasca is a 76 y.o. female who presents today to [facility] for initial evaluation     HPI:   Urinary Tract Infection   This is a new problem. The current episode started in the past 7 days. The problem has been gradually worsening. The quality of the pain is described as aching and burning. The pain is at a severity of 4/10. The pain is mild. There has been no fever. Associated symptoms include flank pain. Pertinent negatives include no chills, frequency, hematuria, nausea or vomiting. She has tried nothing for the symptoms.   Vaginal Itching  The patient's primary symptoms include genital itching. The patient's pertinent negatives include no vaginal discharge. This is a new problem. The current episode started in the past 7 days. The problem occurs daily. The patient is experiencing no pain. She is not pregnant. Associated symptoms include flank pain. Pertinent negatives include no chills, frequency, hematuria, nausea or vomiting. She has tried nothing for the symptoms.         Objective     Problem List:  Patient Active Problem List   Diagnosis   • Osteopenia   • Hypothyroidism due to acquired atrophy of thyroid   • Recurrent major depressive disorder, in partial remission (HCC)   • Vitamin D deficiency   • Morbid obesity (HCC)   • Menopausal symptom   • Healthcare maintenance   • Bilateral knee pain   • Encounter for screening mammogram for breast cancer   • Encounter for immunization   • Asymptomatic cholelithiasis   • Atrophic vaginitis   • Other fatigue   • Essential hypertension   • Grief reaction       Allergy:   Allergies   Allergen Reactions   • Macrobid [Nitrofurantoin] Swelling   • Sulfa Antibiotics Rash        Discontinued Medications:  There are no discontinued medications.    Current Medications:   Current Outpatient Medications   Medication Sig Dispense Refill   •  cholecalciferol (VITAMIN D3) 25 MCG (1000 UT) tablet Take 1 tablet by mouth Daily. 30 tablet 5   • conjugated estrogens (PREMARIN) 0.625 MG/GM vaginal cream Insert  into the vagina 3 (Three) Times a Week. 30 g 5   • Diclofenac Sodium (VOLTAREN) 1 % gel gel Apply 4 g topically to the appropriate area as directed 4 (Four) Times a Day As Needed (joint pain). 500 g 5   • losartan (COZAAR) 50 MG tablet Take 1 tablet by mouth Daily. 30 tablet 5   • phentermine 37.5 MG capsule Take 1 capsule by mouth Every Morning. 30 capsule 5   • Synthroid 112 MCG tablet Take 1 tablet by mouth Daily. 30 tablet 5   • vitamin B-12 (CYANOCOBALAMIN) 500 MCG tablet Take 1 tablet by mouth Daily. 30 tablet 5   • Zoster Vac Recomb Adjuvanted (Shingrix) 50 MCG/0.5ML reconstituted suspension Inject 0.5 mL into the appropriate muscle as directed by prescriber See Admin Instructions. Repeat in 2-6 months 1 each 1   • ciprofloxacin (Cipro) 500 MG tablet Take 1 tablet by mouth 2 (Two) Times a Day for 7 days. 14 tablet 0   • fluconazole (Diflucan) 150 MG tablet Take 1 tablet by mouth Daily for 3 days. 3 tablet 0     No current facility-administered medications for this visit.       Past Medical History:  Past Medical History:   Diagnosis Date   • Depression    • Essential hypertension    • Hypothyroidism due to acquired atrophy of thyroid    • Osteopenia        Past Surgical History:  Past Surgical History:   Procedure Laterality Date   • COLONOSCOPY      about 10-12 years ago   • COLONOSCOPY N/A 6/20/2019    Procedure: COLONOSCOPY;  Surgeon: Kee Robert MD;  Location: [facility];  Service: Gastroenterology   • EYE SURGERY     • TUBAL ABDOMINAL LIGATION         Review of Systems:  Review of Systems   Constitutional: Negative for chills.   HENT: Negative.    Respiratory: Negative.    Gastrointestinal: Negative for nausea and vomiting.   Genitourinary: Positive for flank pain. Negative for frequency, hematuria and vaginal discharge.  "  Neurological: Negative.    Psychiatric/Behavioral: Negative.        Physical Exam:  Physical Exam  Vitals and nursing note reviewed.   Constitutional:       General: She is not in acute distress.     Appearance: Normal appearance. She is not ill-appearing.      Interventions: Face mask in place.   HENT:      Head: Normocephalic and atraumatic.      Nose: Nose normal.      Mouth/Throat:      Mouth: Mucous membranes are moist.      Pharynx: Oropharynx is clear.   Cardiovascular:      Rate and Rhythm: Normal rate and regular rhythm.   Pulmonary:      Effort: Pulmonary effort is normal. No respiratory distress.      Breath sounds: Normal breath sounds.   Abdominal:      Palpations: Abdomen is soft.   Musculoskeletal:         General: Normal range of motion.      Cervical back: Normal range of motion and neck supple.   Skin:     General: Skin is warm and dry.      Capillary Refill: Capillary refill takes less than 2 seconds.   Neurological:      Mental Status: She is alert and oriented to person, place, and time.   Psychiatric:         Mood and Affect: Mood normal.         Behavior: Behavior normal.         Vital Signs:   /80   Pulse 86   Temp 96.4 °F (35.8 °C)   Ht 167.6 cm (66\")   Wt 105 kg (231 lb)   SpO2 94%   BMI 37.28 kg/m²      Lab Results:   Office Visit on 10/07/2021   Component Date Value Ref Range Status   • Color 10/07/2021 Yellow  Yellow, Straw, Dark Yellow, Leslie Final   • Clarity, UA 10/07/2021 Clear  Clear Final   • Specific Gravity  10/07/2021 1.015  1.005 - 1.030 Final   • pH, Urine 10/07/2021 6.0  5.0 - 8.0 Final   • Leukocytes 10/07/2021 Large (3+)* Negative Final   • Nitrite, UA 10/07/2021 Negative  Negative Final   • Protein, POC 10/07/2021 Negative  Negative mg/dL Final   • Glucose, UA 10/07/2021 Negative  Negative, 1000 mg/dL (3+) mg/dL Final   • Ketones, UA 10/07/2021 Negative  Negative Final   • Urobilinogen, UA 10/07/2021 Normal  Normal Final   • Bilirubin 10/07/2021 Negative  " Negative Final   • Blood, UA 10/07/2021 2+* Negative Final   Lab on 10/01/2021   Component Date Value Ref Range Status   • COVID19 10/01/2021 Not Detected  Not Detected - Ref. Range Final   Lab on 09/29/2021   Component Date Value Ref Range Status   • Glucose 09/29/2021 83  65 - 99 mg/dL Final   • BUN 09/29/2021 17  8 - 23 mg/dL Final   • Creatinine 09/29/2021 1.07* 0.57 - 1.00 mg/dL Final   • Sodium 09/29/2021 137  136 - 145 mmol/L Final   • Potassium 09/29/2021 5.3* 3.5 - 5.2 mmol/L Final   • Chloride 09/29/2021 103  98 - 107 mmol/L Final   • CO2 09/29/2021 23.6  22.0 - 29.0 mmol/L Final   • Calcium 09/29/2021 9.5  8.6 - 10.5 mg/dL Final   • Total Protein 09/29/2021 7.4  6.0 - 8.5 g/dL Final   • Albumin 09/29/2021 4.40  3.50 - 5.20 g/dL Final   • ALT (SGPT) 09/29/2021 14  1 - 33 U/L Final   • AST (SGOT) 09/29/2021 20  1 - 32 U/L Final   • Alkaline Phosphatase 09/29/2021 95  39 - 117 U/L Final   • Total Bilirubin 09/29/2021 0.4  0.0 - 1.2 mg/dL Final   • eGFR Non African Amer 09/29/2021 50* >60 mL/min/1.73 Final   • Globulin 09/29/2021 3.0  gm/dL Final   • A/G Ratio 09/29/2021 1.5  g/dL Final   • BUN/Creatinine Ratio 09/29/2021 15.9  7.0 - 25.0 Final   • Anion Gap 09/29/2021 10.4  5.0 - 15.0 mmol/L Final   • Total Cholesterol 09/29/2021 180  0 - 200 mg/dL Final   • Triglycerides 09/29/2021 71  0 - 150 mg/dL Final   • HDL Cholesterol 09/29/2021 50  40 - 60 mg/dL Final   • LDL Cholesterol  09/29/2021 117* 0 - 100 mg/dL Final   • VLDL Cholesterol 09/29/2021 13  5 - 40 mg/dL Final   • LDL/HDL Ratio 09/29/2021 2.32   Final   • TSH 09/29/2021 4.010  0.270 - 4.200 uIU/mL Final   • 25 Hydroxy, Vitamin D 09/29/2021 56.7  30.0 - 100.0 ng/ml Final   • WBC 09/29/2021 5.90  3.40 - 10.80 10*3/mm3 Final   • RBC 09/29/2021 4.21  3.77 - 5.28 10*6/mm3 Final   • Hemoglobin 09/29/2021 12.5  12.0 - 15.9 g/dL Final   • Hematocrit 09/29/2021 39.0  34.0 - 46.6 % Final   • MCV 09/29/2021 92.6  79.0 - 97.0 fL Final   • MCH 09/29/2021 29.7   26.6 - 33.0 pg Final   • MCHC 09/29/2021 32.1  31.5 - 35.7 g/dL Final   • RDW 09/29/2021 12.9  12.3 - 15.4 % Final   • RDW-SD 09/29/2021 43.9  37.0 - 54.0 fl Final   • MPV 09/29/2021 10.9  6.0 - 12.0 fL Final   • Platelets 09/29/2021 409  140 - 450 10*3/mm3 Final   • Neutrophil % 09/29/2021 59.6  42.7 - 76.0 % Final   • Lymphocyte % 09/29/2021 24.7  19.6 - 45.3 % Final   • Monocyte % 09/29/2021 9.2  5.0 - 12.0 % Final   • Eosinophil % 09/29/2021 4.7  0.3 - 6.2 % Final   • Basophil % 09/29/2021 1.5  0.0 - 1.5 % Final   • Immature Grans % 09/29/2021 0.3  0.0 - 0.5 % Final   • Neutrophils, Absolute 09/29/2021 3.51  1.70 - 7.00 10*3/mm3 Final   • Lymphocytes, Absolute 09/29/2021 1.46  0.70 - 3.10 10*3/mm3 Final   • Monocytes, Absolute 09/29/2021 0.54  0.10 - 0.90 10*3/mm3 Final   • Eosinophils, Absolute 09/29/2021 0.28  0.00 - 0.40 10*3/mm3 Final   • Basophils, Absolute 09/29/2021 0.09  0.00 - 0.20 10*3/mm3 Final   • Immature Grans, Absolute 09/29/2021 0.02  0.00 - 0.05 10*3/mm3 Final   • nRBC 09/29/2021 0.0  0.0 - 0.2 /100 WBC Final       EKG Results:  No orders to display       Imaging Results:  No Images in the past 120 days found..              Assessment and Plan   Diagnoses and all orders for this visit:    1. Dysuria (Primary)  -     POCT urinalysis dipstick, automated  -     Urine Culture - Urine, Urine, Clean Catch; Future  -     Urine Culture - Urine, Urine, Clean Catch  -     ciprofloxacin (Cipro) 500 MG tablet; Take 1 tablet by mouth 2 (Two) Times a Day for 7 days.  Dispense: 14 tablet; Refill: 0    2. Vagina, candidiasis  -     fluconazole (Diflucan) 150 MG tablet; Take 1 tablet by mouth Daily for 3 days.  Dispense: 3 tablet; Refill: 0        Meds ordered during this visit:  New Medications Ordered This Visit   Medications   • ciprofloxacin (Cipro) 500 MG tablet     Sig: Take 1 tablet by mouth 2 (Two) Times a Day for 7 days.     Dispense:  14 tablet     Refill:  0   • fluconazole (Diflucan) 150 MG tablet      Sig: Take 1 tablet by mouth Daily for 3 days.     Dispense:  3 tablet     Refill:  0       Patient Instructions:  Patient instructions given for the following visit diagnosis:    ICD-10-CM ICD-9-CM   1. Dysuria  R30.0 788.1   2. Vagina, candidiasis  B37.3 112.1       Follow Up   No follow-ups on file.        This document has been electronically signed by MARCELO Monk  October 7, 2021 09:45 EDT    Patient was given instructions and counseling regarding her condition or for health maintenance advice. Please see specific information pulled into the AVS if appropriate.     Part of this note may be an electronic transcription/translation of spoken language to printed text using the Dragon Dictation System.

## 2021-10-09 LAB — BACTERIA SPEC AEROBE CULT: ABNORMAL

## 2021-10-11 NOTE — PROGRESS NOTES
Please call patient, inform her urine culture was positive for e. Coli. Continue antibiotic as prescribed until completed.

## 2021-12-28 ENCOUNTER — OFFICE VISIT (OUTPATIENT)
Dept: FAMILY MEDICINE CLINIC | Facility: CLINIC | Age: 76
End: 2021-12-28

## 2021-12-28 VITALS
TEMPERATURE: 98 F | SYSTOLIC BLOOD PRESSURE: 162 MMHG | WEIGHT: 230 LBS | BODY MASS INDEX: 36.96 KG/M2 | DIASTOLIC BLOOD PRESSURE: 80 MMHG | OXYGEN SATURATION: 98 % | HEART RATE: 103 BPM | HEIGHT: 66 IN

## 2021-12-28 DIAGNOSIS — B37.9 YEAST INFECTION: ICD-10-CM

## 2021-12-28 DIAGNOSIS — I10 ESSENTIAL HYPERTENSION: Chronic | ICD-10-CM

## 2021-12-28 DIAGNOSIS — R30.0 DYSURIA: Primary | ICD-10-CM

## 2021-12-28 LAB
BILIRUB BLD-MCNC: NEGATIVE MG/DL
CLARITY, POC: ABNORMAL
COLOR UR: YELLOW
EXPIRATION DATE: ABNORMAL
GLUCOSE UR STRIP-MCNC: NEGATIVE MG/DL
KETONES UR QL: NEGATIVE
LEUKOCYTE EST, POC: ABNORMAL
Lab: ABNORMAL
NITRITE UR-MCNC: NEGATIVE MG/ML
PH UR: 6 [PH] (ref 5–8)
PROT UR STRIP-MCNC: NEGATIVE MG/DL
RBC # UR STRIP: NEGATIVE /UL
SP GR UR: 1.01 (ref 1–1.03)
UROBILINOGEN UR QL: NORMAL

## 2021-12-28 PROCEDURE — 87186 SC STD MICRODIL/AGAR DIL: CPT | Performed by: NURSE PRACTITIONER

## 2021-12-28 PROCEDURE — 81003 URINALYSIS AUTO W/O SCOPE: CPT | Performed by: NURSE PRACTITIONER

## 2021-12-28 PROCEDURE — 99214 OFFICE O/P EST MOD 30 MIN: CPT | Performed by: NURSE PRACTITIONER

## 2021-12-28 PROCEDURE — 87086 URINE CULTURE/COLONY COUNT: CPT | Performed by: NURSE PRACTITIONER

## 2021-12-28 RX ORDER — AMOXICILLIN AND CLAVULANATE POTASSIUM 875; 125 MG/1; MG/1
1 TABLET, FILM COATED ORAL 2 TIMES DAILY
Qty: 14 TABLET | Refills: 0 | Status: SHIPPED | OUTPATIENT
Start: 2021-12-28 | End: 2022-01-21

## 2021-12-28 RX ORDER — FLUCONAZOLE 150 MG/1
150 TABLET ORAL DAILY
Qty: 3 TABLET | Refills: 0 | Status: SHIPPED | OUTPATIENT
Start: 2021-12-28 | End: 2022-01-21

## 2021-12-28 NOTE — PROGRESS NOTES
Chief Complaint  Urinary Tract Infection    Subjective          Tennille Gasca is a 76 y.o. female who presents today to [facility] for initial evaluation for urinary symptoms.    HPI:   Urinary Tract Infection   This is a new problem. The current episode started in the past 7 days. The problem occurs every urination. The problem has been unchanged. The quality of the pain is described as aching and burning. The pain is at a severity of 4/10. The pain is mild. Associated symptoms include urgency. Pertinent negatives include no hematuria.   Patient states she had a UTI in October, we had treated with Cipro and symptoms resolved. Previous urine culture was positive for e. Coli. Patient states she has since seen her Gyn and was prescribed an estrogen cream to use and this has helped until now.     Essential Hypertension- patient's blood pressure today is 162/80. Patient states she has been taking her blood pressure medication as prescribed. Lately she has been more stressed because of the holidays and had a lot to do. She states she does have a blood pressure monitor at home and plans on checking more often and trying more relaxation techniques and de-stressing.    Objective     Problem List:  Patient Active Problem List   Diagnosis   • Osteopenia   • Hypothyroidism due to acquired atrophy of thyroid   • Recurrent major depressive disorder, in partial remission (HCC)   • Vitamin D deficiency   • Morbid obesity (HCC)   • Menopausal symptom   • Healthcare maintenance   • Bilateral knee pain   • Encounter for screening mammogram for breast cancer   • Encounter for immunization   • Asymptomatic cholelithiasis   • Atrophic vaginitis   • Other fatigue   • Essential hypertension   • Grief reaction       Allergy:   Allergies   Allergen Reactions   • Macrobid [Nitrofurantoin] Swelling   • Sulfa Antibiotics Rash        Discontinued Medications:  There are no discontinued medications.    Current  Medications:   Current Outpatient Medications   Medication Sig Dispense Refill   • cholecalciferol (VITAMIN D3) 25 MCG (1000 UT) tablet Take 1 tablet by mouth Daily. 30 tablet 5   • conjugated estrogens (PREMARIN) 0.625 MG/GM vaginal cream Insert  into the vagina 3 (Three) Times a Week. 30 g 5   • Diclofenac Sodium (VOLTAREN) 1 % gel gel Apply 4 g topically to the appropriate area as directed 4 (Four) Times a Day As Needed (joint pain). 500 g 5   • losartan (COZAAR) 50 MG tablet Take 1 tablet by mouth Daily. 30 tablet 5   • Methenamine-Sodium Salicylate (CYSTEX PO) Take  by mouth.     • phentermine 37.5 MG capsule Take 1 capsule by mouth Every Morning. 30 capsule 5   • Synthroid 112 MCG tablet Take 1 tablet by mouth Daily. 30 tablet 5   • vitamin B-12 (CYANOCOBALAMIN) 500 MCG tablet Take 1 tablet by mouth Daily. 30 tablet 5   • Zoster Vac Recomb Adjuvanted (Shingrix) 50 MCG/0.5ML reconstituted suspension Inject 0.5 mL into the appropriate muscle as directed by prescriber See Admin Instructions. Repeat in 2-6 months 1 each 1   • amoxicillin-clavulanate (Augmentin) 875-125 MG per tablet Take 1 tablet by mouth 2 (Two) Times a Day. 14 tablet 0   • fluconazole (Diflucan) 150 MG tablet Take 1 tablet by mouth Daily. 3 tablet 0     No current facility-administered medications for this visit.       Past Medical History:  Past Medical History:   Diagnosis Date   • Depression    • Essential hypertension    • Hypothyroidism due to acquired atrophy of thyroid    • Osteopenia        Past Surgical History:  Past Surgical History:   Procedure Laterality Date   • COLONOSCOPY      about 10-12 years ago   • COLONOSCOPY N/A 6/20/2019    Procedure: COLONOSCOPY;  Surgeon: Kee Robert MD;  Location: [facility];  Service: Gastroenterology   • EYE SURGERY     • TUBAL ABDOMINAL LIGATION         Review of Systems:  Review of Systems   Constitutional: Negative.    HENT: Negative.    Eyes: Negative.    Respiratory: Negative.   "  Cardiovascular: Negative.    Gastrointestinal: Negative.    Genitourinary: Positive for dysuria and urgency. Negative for hematuria.   Musculoskeletal: Negative.    Skin: Negative.    Neurological: Negative.    Psychiatric/Behavioral: Negative.    All other systems reviewed and are negative.      Physical Exam:  Physical Exam  Vitals and nursing note reviewed.   Constitutional:       General: She is not in acute distress.     Appearance: Normal appearance. She is not ill-appearing.      Interventions: Face mask in place.   HENT:      Head: Normocephalic and atraumatic.      Nose: Nose normal.      Mouth/Throat:      Mouth: Mucous membranes are moist.      Pharynx: Oropharynx is clear.   Cardiovascular:      Rate and Rhythm: Normal rate and regular rhythm.   Pulmonary:      Effort: Pulmonary effort is normal. No respiratory distress.      Breath sounds: Normal breath sounds.   Abdominal:      Palpations: Abdomen is soft.   Musculoskeletal:         General: Normal range of motion.      Cervical back: Normal range of motion and neck supple.   Skin:     General: Skin is warm and dry.   Neurological:      Mental Status: She is alert and oriented to person, place, and time.   Psychiatric:         Mood and Affect: Mood normal.         Behavior: Behavior normal.         Vital Signs:   /80 (BP Location: Right arm, Patient Position: Sitting)   Pulse 103   Temp 98 °F (36.7 °C)   Ht 167.6 cm (66\")   Wt 104 kg (230 lb)   SpO2 98%   BMI 37.12 kg/m²      Lab Results:   Office Visit on 12/28/2021   Component Date Value Ref Range Status   • Color 12/28/2021 Yellow  Yellow, Straw, Dark Yellow, Leslie Final   • Clarity, UA 12/28/2021 Slightly Cloudy* Clear Final   • Specific Gravity  12/28/2021 1.015  1.005 - 1.030 Final   • pH, Urine 12/28/2021 6.0  5.0 - 8.0 Final   • Leukocytes 12/28/2021 Moderate (2+)* Negative Final   • Nitrite, UA 12/28/2021 Negative  Negative Final   • Protein, POC 12/28/2021 Negative  Negative " mg/dL Final   • Glucose, UA 12/28/2021 Negative  Negative, 1000 mg/dL (3+) mg/dL Final   • Ketones, UA 12/28/2021 Negative  Negative Final   • Urobilinogen, UA 12/28/2021 Normal  Normal Final   • Bilirubin 12/28/2021 Negative  Negative Final   • Blood, UA 12/28/2021 Negative  Negative Final   • Lot Number 12/28/2021 [id number]   Final   • Expiration Date 12/28/2021 1/08/2023   Final   Office Visit on 10/07/2021   Component Date Value Ref Range Status   • Color 10/07/2021 Yellow  Yellow, Straw, Dark Yellow, Leslie Final   • Clarity, UA 10/07/2021 Clear  Clear Final   • Specific Gravity  10/07/2021 1.015  1.005 - 1.030 Final   • pH, Urine 10/07/2021 6.0  5.0 - 8.0 Final   • Leukocytes 10/07/2021 Large (3+)* Negative Final   • Nitrite, UA 10/07/2021 Negative  Negative Final   • Protein, POC 10/07/2021 Negative  Negative mg/dL Final   • Glucose, UA 10/07/2021 Negative  Negative, 1000 mg/dL (3+) mg/dL Final   • Ketones, UA 10/07/2021 Negative  Negative Final   • Urobilinogen, UA 10/07/2021 Normal  Normal Final   • Bilirubin 10/07/2021 Negative  Negative Final   • Blood, UA 10/07/2021 2+* Negative Final   • Urine Culture 10/07/2021 50,000 CFU/mL Escherichia coli*  Final   Lab on 10/01/2021   Component Date Value Ref Range Status   • COVID19 10/01/2021 Not Detected  Not Detected - Ref. Range Final   Lab on 09/29/2021   Component Date Value Ref Range Status   • Glucose 09/29/2021 83  65 - 99 mg/dL Final   • BUN 09/29/2021 17  8 - 23 mg/dL Final   • Creatinine 09/29/2021 1.07* 0.57 - 1.00 mg/dL Final   • Sodium 09/29/2021 137  136 - 145 mmol/L Final   • Potassium 09/29/2021 5.3* 3.5 - 5.2 mmol/L Final   • Chloride 09/29/2021 103  98 - 107 mmol/L Final   • CO2 09/29/2021 23.6  22.0 - 29.0 mmol/L Final   • Calcium 09/29/2021 9.5  8.6 - 10.5 mg/dL Final   • Total Protein 09/29/2021 7.4  6.0 - 8.5 g/dL Final   • Albumin 09/29/2021 4.40  3.50 - 5.20 g/dL Final   • ALT (SGPT) 09/29/2021 14  1 - 33 U/L Final   • AST (SGOT)  09/29/2021 20  1 - 32 U/L Final   • Alkaline Phosphatase 09/29/2021 95  39 - 117 U/L Final   • Total Bilirubin 09/29/2021 0.4  0.0 - 1.2 mg/dL Final   • eGFR Non African Amer 09/29/2021 50* >60 mL/min/1.73 Final   • Globulin 09/29/2021 3.0  gm/dL Final   • A/G Ratio 09/29/2021 1.5  g/dL Final   • BUN/Creatinine Ratio 09/29/2021 15.9  7.0 - 25.0 Final   • Anion Gap 09/29/2021 10.4  5.0 - 15.0 mmol/L Final   • Total Cholesterol 09/29/2021 180  0 - 200 mg/dL Final   • Triglycerides 09/29/2021 71  0 - 150 mg/dL Final   • HDL Cholesterol 09/29/2021 50  40 - 60 mg/dL Final   • LDL Cholesterol  09/29/2021 117* 0 - 100 mg/dL Final   • VLDL Cholesterol 09/29/2021 13  5 - 40 mg/dL Final   • LDL/HDL Ratio 09/29/2021 2.32   Final   • TSH 09/29/2021 4.010  0.270 - 4.200 uIU/mL Final   • 25 Hydroxy, Vitamin D 09/29/2021 56.7  30.0 - 100.0 ng/ml Final   • WBC 09/29/2021 5.90  3.40 - 10.80 10*3/mm3 Final   • RBC 09/29/2021 4.21  3.77 - 5.28 10*6/mm3 Final   • Hemoglobin 09/29/2021 12.5  12.0 - 15.9 g/dL Final   • Hematocrit 09/29/2021 39.0  34.0 - 46.6 % Final   • MCV 09/29/2021 92.6  79.0 - 97.0 fL Final   • MCH 09/29/2021 29.7  26.6 - 33.0 pg Final   • MCHC 09/29/2021 32.1  31.5 - 35.7 g/dL Final   • RDW 09/29/2021 12.9  12.3 - 15.4 % Final   • RDW-SD 09/29/2021 43.9  37.0 - 54.0 fl Final   • MPV 09/29/2021 10.9  6.0 - 12.0 fL Final   • Platelets 09/29/2021 409  140 - 450 10*3/mm3 Final   • Neutrophil % 09/29/2021 59.6  42.7 - 76.0 % Final   • Lymphocyte % 09/29/2021 24.7  19.6 - 45.3 % Final   • Monocyte % 09/29/2021 9.2  5.0 - 12.0 % Final   • Eosinophil % 09/29/2021 4.7  0.3 - 6.2 % Final   • Basophil % 09/29/2021 1.5  0.0 - 1.5 % Final   • Immature Grans % 09/29/2021 0.3  0.0 - 0.5 % Final   • Neutrophils, Absolute 09/29/2021 3.51  1.70 - 7.00 10*3/mm3 Final   • Lymphocytes, Absolute 09/29/2021 1.46  0.70 - 3.10 10*3/mm3 Final   • Monocytes, Absolute 09/29/2021 0.54  0.10 - 0.90 10*3/mm3 Final   • Eosinophils, Absolute  09/29/2021 0.28  0.00 - 0.40 10*3/mm3 Final   • Basophils, Absolute 09/29/2021 0.09  0.00 - 0.20 10*3/mm3 Final   • Immature Grans, Absolute 09/29/2021 0.02  0.00 - 0.05 10*3/mm3 Final   • nRBC 09/29/2021 0.0  0.0 - 0.2 /100 WBC Final       EKG Results:  No orders to display       Imaging Results:  No Images in the past 120 days found..              Assessment and Plan   Diagnoses and all orders for this visit:    1. Dysuria (Primary)  Comments:  U/A abnormal, + for leuks  will culture  patient to start augmentin, take as prescribed  last BMP with cr of 1.07, GFR 50  Orders:  -     POCT urinalysis dipstick, automated  -     Urine Culture - Urine, Urine, Clean Catch; Future  -     Urine Culture - Urine, Urine, Clean Catch  -     amoxicillin-clavulanate (Augmentin) 875-125 MG per tablet; Take 1 tablet by mouth 2 (Two) Times a Day.  Dispense: 14 tablet; Refill: 0    2. Essential hypertension  Comments:  continue medications as prescribed  losartan 50 mg daily  check blood pressure daily  keep BP log    3. Yeast infection  Comments:  diflucan prescribed  patient gets yeast infections commonly after atb  Orders:  -     fluconazole (Diflucan) 150 MG tablet; Take 1 tablet by mouth Daily.  Dispense: 3 tablet; Refill: 0        Meds ordered during this visit:  New Medications Ordered This Visit   Medications   • amoxicillin-clavulanate (Augmentin) 875-125 MG per tablet     Sig: Take 1 tablet by mouth 2 (Two) Times a Day.     Dispense:  14 tablet     Refill:  0   • fluconazole (Diflucan) 150 MG tablet     Sig: Take 1 tablet by mouth Daily.     Dispense:  3 tablet     Refill:  0       Patient Instructions:  Patient instructions given for the following visit diagnosis:    ICD-10-CM ICD-9-CM   1. Dysuria  R30.0 788.1   2. Essential hypertension  I10 401.9   3. Yeast infection  B37.9 112.9       Follow Up   Return for Next scheduled follow up.        This document has been electronically signed by MARCELO Monk  December  28, 2021 15:13 EST    Patient was given instructions and counseling regarding her condition or for health maintenance advice. Please see specific information pulled into the AVS if appropriate.     Part of this note may be an electronic transcription/translation of spoken language to printed text using the Dragon Dictation System.

## 2022-01-02 LAB
BACTERIA UR CULT: ABNORMAL
BACTERIA UR CULT: ABNORMAL
OTHER ANTIBIOTIC SUSC ISLT: ABNORMAL

## 2022-01-21 ENCOUNTER — OFFICE VISIT (OUTPATIENT)
Dept: FAMILY MEDICINE CLINIC | Facility: CLINIC | Age: 77
End: 2022-01-21

## 2022-01-21 DIAGNOSIS — E66.01 MORBID OBESITY: ICD-10-CM

## 2022-01-21 DIAGNOSIS — M85.80 OSTEOPENIA, UNSPECIFIED LOCATION: ICD-10-CM

## 2022-01-21 DIAGNOSIS — I10 ESSENTIAL HYPERTENSION: Primary | ICD-10-CM

## 2022-01-21 DIAGNOSIS — M25.561 CHRONIC PAIN OF BOTH KNEES: ICD-10-CM

## 2022-01-21 DIAGNOSIS — E66.01 CLASS 2 SEVERE OBESITY WITH SERIOUS COMORBIDITY AND BODY MASS INDEX (BMI) OF 37.0 TO 37.9 IN ADULT, UNSPECIFIED OBESITY TYPE: ICD-10-CM

## 2022-01-21 DIAGNOSIS — M25.562 CHRONIC PAIN OF BOTH KNEES: ICD-10-CM

## 2022-01-21 DIAGNOSIS — N95.2 ATROPHIC VAGINITIS: ICD-10-CM

## 2022-01-21 DIAGNOSIS — F33.41 RECURRENT MAJOR DEPRESSIVE DISORDER, IN PARTIAL REMISSION: ICD-10-CM

## 2022-01-21 DIAGNOSIS — E03.4 HYPOTHYROIDISM DUE TO ACQUIRED ATROPHY OF THYROID: ICD-10-CM

## 2022-01-21 DIAGNOSIS — Z00.00 HEALTHCARE MAINTENANCE: ICD-10-CM

## 2022-01-21 DIAGNOSIS — E55.9 VITAMIN D DEFICIENCY: ICD-10-CM

## 2022-01-21 DIAGNOSIS — Z12.31 ENCOUNTER FOR SCREENING MAMMOGRAM FOR BREAST CANCER: ICD-10-CM

## 2022-01-21 DIAGNOSIS — N95.1 MENOPAUSAL SYMPTOM: ICD-10-CM

## 2022-01-21 DIAGNOSIS — G89.29 CHRONIC PAIN OF BOTH KNEES: ICD-10-CM

## 2022-01-21 PROBLEM — F43.21 GRIEF REACTION: Status: RESOLVED | Noted: 2021-03-22 | Resolved: 2022-01-21

## 2022-01-21 PROBLEM — F43.20 GRIEF REACTION: Status: RESOLVED | Noted: 2021-03-22 | Resolved: 2022-01-21

## 2022-01-21 PROCEDURE — 1170F FXNL STATUS ASSESSED: CPT | Performed by: GENERAL PRACTICE

## 2022-01-21 PROCEDURE — G0439 PPPS, SUBSEQ VISIT: HCPCS | Performed by: GENERAL PRACTICE

## 2022-01-21 PROCEDURE — 1159F MED LIST DOCD IN RCRD: CPT | Performed by: GENERAL PRACTICE

## 2022-01-21 NOTE — PATIENT INSTRUCTIONS
Advance Directive    Advance directives are legal documents that let you make choices ahead of time about your health care and medical treatment in case you become unable to communicate for yourself. Advance directives are a way for you to make known your wishes to family, friends, and health care providers. This can let others know about your end-of-life care if you become unable to communicate.  Discussing and writing advance directives should happen over time rather than all at once. Advance directives can be changed depending on your situation and what you want, even after you have signed the advance directives.  There are different types of advance directives, such as:  · Medical power of .  · Living will.  · Do not resuscitate (DNR) or do not attempt resuscitation (DNAR) order.  Health care proxy and medical power of   A health care proxy is also called a health care agent. This is a person who is appointed to make medical decisions for you in cases where you are unable to make the decisions yourself. Generally, people choose someone they know well and trust to represent their preferences. Make sure to ask this person for an agreement to act as your proxy. A proxy may have to exercise judgment in the event of a medical decision for which your wishes are not known.  A medical power of  is a legal document that names your health care proxy. Depending on the laws in your state, after the document is written, it may also need to be:  · Signed.  · Notarized.  · Dated.  · Copied.  · Witnessed.  · Incorporated into your medical record.  You may also want to appoint someone to manage your money in a situation in which you are unable to do so. This is called a durable power of  for finances. It is a separate legal document from the durable power of  for health care. You may choose the same person or someone different from your health care proxy to act as your agent in money  matters.  If you do not appoint a proxy, or if there is a concern that the proxy is not acting in your best interests, a court may appoint a guardian to act on your behalf.  Living will  A living will is a set of instructions that state your wishes about medical care when you cannot express them yourself. Health care providers should keep a copy of your living will in your medical record. You may want to give a copy to family members or friends. To alert caregivers in case of an emergency, you can place a card in your wallet to let them know that you have a living will and where they can find it. A living will is used if you become:  · Terminally ill.  · Disabled.  · Unable to communicate or make decisions.  Items to consider in your living will include:  · To use or not to use life-support equipment, such as dialysis machines and breathing machines (ventilators).  · A DNR or DNAR order. This tells health care providers not to use cardiopulmonary resuscitation (CPR) if breathing or heartbeat stops.  · To use or not to use tube feeding.  · To be given or not to be given food and fluids.  · Comfort (palliative) care when the goal becomes comfort rather than a cure.  · Donation of organs and tissues.  A living will does not give instructions for distributing your money and property if you should pass away.  DNR or DNAR  A DNR or DNAR order is a request not to have CPR in the event that your heart stops beating or you stop breathing. If a DNR or DNAR order has not been made and shared, a health care provider will try to help any patient whose heart has stopped or who has stopped breathing. If you plan to have surgery, talk with your health care provider about how your DNR or DNAR order will be followed if problems occur.  What if I do not have an advance directive?  If you do not have an advance directive, some states assign family decision makers to act on your behalf based on how closely you are related to them. Each  state has its own laws about advance directives. You may want to check with your health care provider, , or state representative about the laws in your state.  Summary  · Advance directives are the legal documents that allow you to make choices ahead of time about your health care and medical treatment in case you become unable to tell others about your care.  · The process of discussing and writing advance directives should happen over time. You can change the advance directives, even after you have signed them.  · Advance directives include DNR or DNAR orders, living mccurdy, and designating an agent as your medical power of .  This information is not intended to replace advice given to you by your health care provider. Make sure you discuss any questions you have with your health care provider.  Document Revised: 01/28/2021 Document Reviewed: 07/16/2020  Elsevier Patient Education © 2021 [employer]      Fall Prevention in the Home, Adult  Falls can cause injuries. They can happen to people of all ages. There are many things you can do to make your home safe and to help prevent falls. Ask for help when making these changes, if needed.  What actions can I take to prevent falls?  General Instructions  · Use good lighting in all rooms. Replace any light bulbs that burn out.  · Turn on the lights when you go into a dark area. Use night-lights.  · Keep items that you use often in easy-to-reach places. Lower the shelves around your home if necessary.  · Set up your furniture so you have a clear path. Avoid moving your furniture around.  · Do not have throw rugs and other things on the floor that can make you trip.  · Avoid walking on wet floors.  · If any of your floors are uneven, fix them.  · Add color or contrast paint or tape to clearly maximiliano and help you see:  ? Any grab bars or handrails.  ? First and last steps of stairways.  ? Where the edge of each step is.  · If you use a stepladder:  ? Make  sure that it is fully opened. Do not climb a closed stepladder.  ? Make sure that both sides of the stepladder are locked into place.  ? Ask someone to hold the stepladder for you while you use it.  · If there are any pets around you, be aware of where they are.  What can I do in the bathroom?         · Keep the floor dry. Clean up any water that spills onto the floor as soon as it happens.  · Remove soap buildup in the tub or shower regularly.  · Use non-skid mats or decals on the floor of the tub or shower.  · Attach bath mats securely with double-sided, non-slip rug tape.  · If you need to sit down in the shower, use a plastic, non-slip stool.  · Install grab bars by the toilet and in the tub and shower. Do not use towel bars as grab bars.  What can I do in the bedroom?  · Make sure that you have a light by your bed that is easy to reach.  · Do not use any sheets or blankets that are too big for your bed. They should not hang down onto the floor.  · Have a firm chair that has side arms. You can use this for support while you get dressed.  What can I do in the kitchen?  · Clean up any spills right away.  · If you need to reach something above you, use a strong step stool that has a grab bar.  · Keep electrical cords out of the way.  · Do not use floor polish or wax that makes floors slippery. If you must use wax, use non-skid floor wax.  What can I do with my stairs?  · Do not leave any items on the stairs.  · Make sure that you have a light switch at the top of the stairs and the bottom of the stairs. If you do not have them, ask someone to add them for you.  · Make sure that there are handrails on both sides of the stairs, and use them. Fix handrails that are broken or loose. Make sure that handrails are as long as the stairways.  · Install non-slip stair treads on all stairs in your home.  · Avoid having throw rugs at the top or bottom of the stairs. If you do have throw rugs, attach them to the floor with  carpet tape.  · Choose a carpet that does not hide the edge of the steps on the stairway.  · Check any carpeting to make sure that it is firmly attached to the stairs. Fix any carpet that is loose or worn.  What can I do on the outside of my home?  · Use bright outdoor lighting.  · Regularly fix the edges of walkways and driveways and fix any cracks.  · Remove anything that might make you trip as you walk through a door, such as a raised step or threshold.  · Trim any bushes or trees on the path to your home.  · Regularly check to see if handrails are loose or broken. Make sure that both sides of any steps have handrails.  · Install guardrails along the edges of any raised decks and porches.  · Clear walking paths of anything that might make someone trip, such as tools or rocks.  · Have any leaves, snow, or ice cleared regularly.  · Use sand or salt on walking paths during winter.  · Clean up any spills in your garage right away. This includes grease or oil spills.  What other actions can I take?  · Wear shoes that:  ? Have a low heel. Do not wear high heels.  ? Have rubber bottoms.  ? Are comfortable and fit you well.  ? Are closed at the toe. Do not wear open-toe sandals.  · Use tools that help you move around (mobility aids) if they are needed. These include:  ? Canes.  ? Walkers.  ? Scooters.  ? Crutches.  · Review your medicines with your doctor. Some medicines can make you feel dizzy. This can increase your chance of falling.  Ask your doctor what other things you can do to help prevent falls.  Where to find more information  · Centers for Disease Control and Prevention, [place]: https://cdc.gov  · [employer] [place] [employer]: https://fo4fmni.julianne.nih.gov  Contact a doctor if:  · You are afraid of falling at home.  · You feel weak, drowsy, or dizzy at home.  · You fall at home.  Summary  · There are many simple things that you can do to make your home safe and to help prevent falls.  · Ways to make your home  safe include removing tripping hazards and installing grab bars in the bathroom.  · Ask for help when making these changes in your home.  This information is not intended to replace advice given to you by your health care provider. Make sure you discuss any questions you have with your health care provider.  Document Revised: 04/09/2020 Document Reviewed: 08/02/2018  Elsevier Patient Education © 2021 [employer]      Mammogram  A mammogram is a low energy X-ray of the breasts that is done to check for abnormal changes. This procedure can screen for and detect any changes that may indicate breast cancer. Mammograms are regularly done on women. A man may have a mammogram if he has a lump or swelling in his breast. A mammogram can also identify other changes and variations in the breast, such as:  · Inflammation of the breast tissue (mastitis).  · An infected area that contains a collection of pus (abscess).  · A fluid-filled sac (cyst).  · Fibrocystic changes. This is when breast tissue becomes denser, which can make the tissue feel rope-like or uneven under the skin.  · Tumors that are not cancerous (benign).  Tell a health care provider:  · About any allergies you have.  · If you have breast implants.  · If you have had previous breast disease, biopsy, or surgery.  · If you are breastfeeding.  · If you are younger than age 25.  · If you have a family history of breast cancer.  · Whether you are pregnant or may be pregnant.  What are the risks?  Generally, this is a safe procedure. However, problems may occur, including:  · Exposure to radiation. Radiation levels are very low with this test.  · The results being misinterpreted.  · The need for further tests.  · The inability of the mammogram to detect certain cancers.  What happens before the procedure?  · Schedule your test about 1-2 weeks after your menstrual period if you are still menstruating. This is usually when your breasts are the least tender.  · If you  have had a mammogram done at a different facility in the past, get the mammogram X-rays or have them sent to your current exam facility. The new and old images will be compared.  · Wash your breasts and underarms on the day of the test.  · Do not wear deodorants, perfumes, lotions, or powders anywhere on your body on the day of the test.  · Remove any jewelry from your neck.  · Wear clothes that you can change into and out of easily.  What happens during the procedure?    · You will undress from the waist up and put on a gown that opens in the front.  · You will  front of the X-ray machine.  · Each breast will be placed between two plastic or glass plates. The plates will compress your breast for a few seconds. Try to stay as relaxed as possible during the procedure. This does not cause any harm to your breasts and any discomfort you feel will be very brief.  · X-rays will be taken from different angles of each breast.  The procedure may vary among health care providers and hospitals.  What happens after the procedure?  · The mammogram will be examined by a specialist (radiologist).  · You may need to repeat certain parts of the test, depending on the quality of the images. This is commonly done if the radiologist needs a better view of the breast tissue.  · You may resume your normal activities.  · It is up to you to get the results of your procedure. Ask your health care provider, or the department that is doing the procedure, when your results will be ready.  Summary  · A mammogram is a low energy X-ray of the breasts that is done to check for abnormal changes. A man may have a mammogram if he has a lump or swelling in his breast.  · If you have had a mammogram done at a different facility in the past, get the mammogram X-rays or have them sent to your current exam facility in order to compare them.  · Schedule your test about 1-2 weeks after your menstrual period if you are still menstruating.  · For  this test, each breast will be placed between two plastic or glass plates. The plates will compress your breast for a few seconds.  · Ask when your test results will be ready. Make sure you get your test results.  This information is not intended to replace advice given to you by your health care provider. Make sure you discuss any questions you have with your health care provider.  Document Revised: 08/08/2019 Document Reviewed: 08/08/2019  [employer] Patient Education © 2021 [employer] Inc.      Osteoporosis    Osteoporosis is when the bones get thin and weak. This can cause your bones to break (fracture) more easily.  What are the causes?  The exact cause of this condition is not known.  What increases the risk?  · Having family members with this condition.  · Not eating enough healthy foods.  · Taking certain medicines.  · Being female.  · Being age 50 or older.  · Smoking or using other products that contain nicotine or tobacco, such as e-cigarettes or chewing tobacco.  · Not exercising.  · Being of  or  ancestry.  · Having a small body frame.  What are the signs or symptoms?  A broken bone might be the first sign, especially if the break results from a fall or injury that usually would not cause a bone to break.  Other signs and symptoms include:  · Pain in the neck or low back.  · Being hunched over (stooped posture).  · Getting shorter.  How is this treated?  · Eating more foods with more calcium and vitamin D in them.  · Doing exercises.  · Stopping tobacco use.  · Limiting how much alcohol you drink.  · Taking medicines to slow bone loss or help make the bones stronger.  · Taking supplements of calcium and vitamin D every day.  · Taking medicines to replace chemicals in the body (hormone replacement medicines).  · Monitoring your levels of calcium and vitamin D.  The goal of treatment is to strengthen your bones and lower your risk for a bone break.  Follow these instructions at home:  Eating and  drinking  Eat plenty of calcium and vitamin D. These nutrients are good for your bones. Good sources of calcium and vitamin D include:  · Some fish, such as salmon and tuna.  · Foods that have calcium and vitamin D added to them (fortified foods), such as some breakfast cereals.  · Egg yolks.  · Cheese.  · Liver.    Activity  Do exercises as told by your doctor. Ask your doctor what exercises are safe for you. You should do:  · Exercises that make your muscles work to hold your body weight up (weight-bearing exercises). These include kassie chi, yoga, and walking.  · Exercises to make your muscles stronger. One example is lifting weights.  Lifestyle  · Do not drink alcohol if:  ? Your doctor tells you not to drink.  ? You are pregnant, may be pregnant, or are planning to become pregnant.  · If you drink alcohol:  ? Limit how much you use to:  § 0-1 drink a day for women.  § 0-2 drinks a day for men.  · Know how much alcohol is in your drink. In the [place] one drink equals one 12 oz bottle of beer (355 mL), one 5 oz glass of wine (148 mL), or one 1½ oz glass of hard liquor (44 mL).  · Do not smoke or use any products that contain nicotine or tobacco. If you need help quitting, ask your doctor.  Preventing falls  · Use tools to help you move around (mobility aids) as needed. These include canes, walkers, scooters, and crutches.  · Keep rooms well-lit.  · Put away things on the floor that could make you trip. These include cords and rugs.  · Install safety rails on stairs. Install grab bars in bathrooms.  · Use rubber mats in slippery areas, like bathrooms.  · Wear shoes that:  ? Fit you well.  ? Support your feet.  ? Have closed toes.  ? Have rubber soles or low heels.  · Tell your doctor about all of the medicines you are taking. Some medicines can make you more likely to fall.  General instructions  · Take over-the-counter and prescription medicines only as told by your doctor.  · Keep all follow-up visits.  Contact a  doctor if:  · You have not been tested (screened) for osteoporosis and you are:  ? A woman who is age 65 or older.  ? A man who is age 70 or older.  Get help right away if:  · You fall.  · You get hurt.  Summary  · Osteoporosis happens when your bones get thin and weak.  · Weak bones can break (fracture) more easily.  · Eat plenty of calcium and vitamin D. These are good for your bones.  · Tell your doctor about all of the medicines that you take.  This information is not intended to replace advice given to you by your health care provider. Make sure you discuss any questions you have with your health care provider.  Document Revised: 06/03/2021 Document Reviewed: 06/03/2021  Elsevier Patient Education © 2021 [employer]

## 2022-01-21 NOTE — PROGRESS NOTES
The ABCs of the Annual Wellness Visit  Subsequent Medicare Wellness Visit    Chief Complaint  Subsequent Medicare Wellness Visit    Subjective    History of Present Illness:  Tennille Gasca is a 76 y.o. female who presents for a Subsequent Medicare Wellness Visit.    Essential Hypertension  She remains on losartan 50 nightly with good control of her blood pressure at home and no apparent side effects. She continues to deny any chest pain, palpitations, lightheadedness, shortness of breath, or swelling of the ankles, and there is no history of any changes in her vision, strength, or sensation  Lab Results   Component Value Date    GLUCOSE 83 09/29/2021    BUN 17 09/29/2021    CREATININE 1.07 (H) 09/29/2021    EGFRIFNONA 50 (L) 09/29/2021    BCR 15.9 09/29/2021    K 5.3 (H) 09/29/2021    CO2 23.6 09/29/2021    CALCIUM 9.5 09/29/2021    ALBUMIN 4.40 09/29/2021    LABIL2 1.5 03/17/2016    AST 20 09/29/2021    ALT 14 09/29/2021     Lab Results   Component Value Date    ALKPHOS 95 09/29/2021     Lab Results   Component Value Date    CHOL 180 09/29/2021    CHLPL 197 03/17/2016    TRIG 71 09/29/2021    HDL 50 09/29/2021     (H) 09/29/2021     Hypothyroidism  She admits to fatigue, and troubles with weight gain. The hypothyroidism is due to Hashimoto's disease. She remains on levothyroxine 112 daily.    Lab Results   Component Value Date    TSH 4.010 09/29/2021     Knee Pain  She continues to have intermittent knee pain as previously described. There has been no change in the quality nor any new associated symptoms.  There is no history of any giving way or locking. The pain is aggravated by prolonged weightbearing and with ladders. Evaluation to date: none. Treatment to date: She has tried OTC topical diclofenac with a significant improvement and no apparent side effects    Depression  She continues to experience intermittent nervousness, and worrying.  She denies any depression at present and there is no history of  anhedonia, difficulty concentrating, impaired memory,  or suicidal thoughts.  With melatonin she continues to average 7-8 hours of sleep nightly.    Labs  Most recent vitamin D 56.7  Lab Results   Component Value Date    WBC 5.90 09/29/2021    HGB 12.5 09/29/2021    HCT 39.0 09/29/2021    MCV 92.6 09/29/2021     09/29/2021     The following portions of the patient's history were reviewed and   updated as appropriate: allergies, current medications, past family history, past medical history, past social history, past surgical history and problem list.    Compared to one year ago, the patient feels her physical   health is the same.    Compared to one year ago, the patient feels her mental   health is the same.    Recent Hospitalizations:  She was not admitted to the hospital during the last year.     Current Medical Providers:  Patient Care Team:  Alfonso Rubi MD as PCP - General  Alfonso Rubi MD as PCP - Family Medicine    Outpatient Medications Prior to Visit   Medication Sig Dispense Refill   • cholecalciferol (VITAMIN D3) 25 MCG (1000 UT) tablet Take 1 tablet by mouth Daily. 30 tablet 5   • conjugated estrogens (PREMARIN) 0.625 MG/GM vaginal cream Insert  into the vagina 3 (Three) Times a Week. 30 g 5   • Diclofenac Sodium (VOLTAREN) 1 % gel gel Apply 4 g topically to the appropriate area as directed 4 (Four) Times a Day As Needed (joint pain). 500 g 5   • losartan (COZAAR) 50 MG tablet Take 1 tablet by mouth Daily. 30 tablet 5   • Methenamine-Sodium Salicylate (CYSTEX PO) Take  by mouth.     • phentermine 37.5 MG capsule Take 1 capsule by mouth Every Morning. 30 capsule 5   • Synthroid 112 MCG tablet Take 1 tablet by mouth Daily. 30 tablet 5   • vitamin B-12 (CYANOCOBALAMIN) 500 MCG tablet Take 1 tablet by mouth Daily. 30 tablet 5   • amoxicillin-clavulanate (Augmentin) 875-125 MG per tablet Take 1 tablet by mouth 2 (Two) Times a Day. 14 tablet 0   • fluconazole (Diflucan) 150 MG  tablet Take 1 tablet by mouth Daily. 3 tablet 0   • Zoster Vac Recomb Adjuvanted (Shingrix) 50 MCG/0.5ML reconstituted suspension Inject 0.5 mL into the appropriate muscle as directed by prescriber See Admin Instructions. Repeat in 2-6 months 1 each 1     No facility-administered medications prior to visit.     No opioid medication identified on active medication list. I have reviewed chart for other potential  high risk medication/s and harmful drug interactions in the elderly.          Aspirin is not on active medication list.  Aspirin use is not indicated based on review of current medical condition/s. Risk of harm outweighs potential benefits.  .    Patient Active Problem List   Diagnosis   • Osteopenia   • Hypothyroidism due to acquired atrophy of thyroid   • Recurrent major depressive disorder, in partial remission ([facility])   • Vitamin D deficiency   • Class 2 severe obesity with serious comorbidity and body mass index (BMI) of 37.0 to 37.9 in adult ([facility])   • Menopausal symptom   • Healthcare maintenance   • Bilateral knee pain   • Encounter for screening mammogram for breast cancer   • Encounter for immunization   • Asymptomatic cholelithiasis   • Atrophic vaginitis   • Other fatigue   • Essential hypertension     Advance Care Planning  Advance Directive is not on file.  ACP discussion was held with the patient during this visit. Patient does not have an advance directive, information provided.    Review of Systems   Constitutional: Positive for fatigue. Negative for appetite change, chills, diaphoresis and fever.   HENT: Negative for congestion, ear pain, postnasal drip, rhinorrhea, sinus pressure, sneezing, sore throat, tinnitus and voice change.    Eyes: Negative for visual disturbance.   Respiratory: Negative for cough, shortness of breath and wheezing.    Cardiovascular: Negative for chest pain, palpitations and leg swelling.   Gastrointestinal: Negative for abdominal pain, blood in stool, constipation,  diarrhea, nausea and vomiting.   Endocrine: Negative for polydipsia and polyuria.   Genitourinary: Negative for dysuria, frequency, hematuria and urgency.   Musculoskeletal: Positive for arthralgias. Negative for back pain, joint swelling and myalgias.   Skin: Negative for rash.   Neurological: Negative for weakness, numbness and confusion.   Psychiatric/Behavioral: Negative for decreased concentration, dysphoric mood, sleep disturbance and suicidal ideas. The patient is nervous/anxious.         Objective    Vitals:    01/21/22 1420   BP: 124/65   Pulse: 91   Resp: 14   Temp: 98.2 °F (36.8 °C)   TempSrc: Temporal   SpO2: 97%   Weight: 104 kg (230 lb)     BMI Readings from Last 1 Encounters:   01/21/22 37.12 kg/m²   BMI is above normal parameters. Recommendations include: exercise counseling and nutrition counseling    Does the patient have evidence of cognitive impairment? No    Physical Exam  Constitutional:       General: She is not in acute distress.     Appearance: Normal appearance. She is well-developed. She is not diaphoretic.      Comments: Bright and in good spirits. No apparent distress. No pallor, jaundice, diaphoresis, or cyanosis.     HENT:      Head: Atraumatic.      Right Ear: Tympanic membrane, ear canal and external ear normal.      Left Ear: Tympanic membrane, ear canal and external ear normal.   Eyes:      Conjunctiva/sclera: Conjunctivae normal.   Neck:      Thyroid: No thyroid mass or thyromegaly.      Vascular: No carotid bruit or JVD.      Trachea: Trachea normal. No tracheal deviation.   Cardiovascular:      Rate and Rhythm: Normal rate and regular rhythm.      Heart sounds: Normal heart sounds, S1 normal and S2 normal. No murmur heard.  No gallop.    Pulmonary:      Effort: Pulmonary effort is normal.      Breath sounds: Normal breath sounds.   Chest:   Breasts:      Right: No supraclavicular adenopathy.      Left: No supraclavicular adenopathy.       Abdominal:      General: Bowel sounds  are normal. There is no distension or abdominal bruit.      Palpations: Abdomen is soft. There is no hepatomegaly, splenomegaly or mass.      Tenderness: There is no abdominal tenderness.      Hernia: No hernia is present.   Musculoskeletal:      Right lower leg: No edema.      Left lower leg: No edema.   Lymphadenopathy:      Head:      Right side of head: No submental, submandibular, tonsillar, preauricular, posterior auricular or occipital adenopathy.      Left side of head: No submental, submandibular, tonsillar, preauricular, posterior auricular or occipital adenopathy.      Cervical: No cervical adenopathy.      Upper Body:      Right upper body: No supraclavicular adenopathy.      Left upper body: No supraclavicular adenopathy.   Skin:     General: Skin is warm.      Coloration: Skin is not cyanotic, jaundiced or pale.      Findings: No rash.      Nails: There is no clubbing.   Neurological:      Mental Status: She is alert and oriented to person, place, and time.      Cranial Nerves: No cranial nerve deficit.      Motor: No tremor.      Coordination: Coordination normal.      Gait: Gait normal.   Psychiatric:         Attention and Perception: Attention normal.         Mood and Affect: Mood normal.         Speech: Speech normal.         Behavior: Behavior normal.         Thought Content: Thought content normal.       HEALTH RISK ASSESSMENT    Smoking Status:  Social History     Tobacco Use   Smoking Status Never Smoker   Smokeless Tobacco Never Used     Alcohol Consumption:  Social History     Substance and Sexual Activity   Alcohol Use No     Fall Risk Screen:  STEADI Fall Risk Assessment was completed, and patient is at LOW risk for falls.Assessment completed on:1/21/2022    Depression Screening:  PHQ-2/PHQ-9 Depression Screening 1/21/2022   Little interest or pleasure in doing things 0   Feeling down, depressed, or hopeless 0   Total Score 0     Health Habits and Functional and Cognitive  Screening:  Functional & Cognitive Status 1/21/2022   Do you have difficulty preparing food and eating? No   Do you have difficulty bathing yourself, getting dressed or grooming yourself? No   Do you have difficulty using the toilet? No   Do you have difficulty moving around from place to place? No   Do you have trouble with steps or getting out of a bed or a chair? No   Current Diet Unhealthy Diet   Dental Exam Not up to date   Eye Exam Up to date   Exercise (times per week) 7 times per week   Current Exercises Include Walking   Do you need help using the phone?  No   Are you deaf or do you have serious difficulty hearing?  No   Do you need help with transportation? No   Do you need help shopping? No   Do you need help preparing meals?  No   Do you need help with housework?  No   Do you need help with laundry? No   Do you need help taking your medications? No   Do you need help managing money? No   Do you ever drive or ride in a car without wearing a seat belt? No   Have you felt unusual stress, anger or loneliness in the last month? No   Who do you live with? Spouse   If you need help, do you have trouble finding someone available to you? No   Have you been bothered in the last four weeks by sexual problems? No   Do you have difficulty concentrating, remembering or making decisions? No     Age-appropriate Screening Schedule:  Refer to the list below for future screening recommendations based on patient's age, sex and/or medical conditions. Orders for these recommended tests are listed in the plan section. The patient has been provided with a written plan.    Health Maintenance   Topic Date Due   • ZOSTER VACCINE (2 of 3) 03/10/2010   • TDAP/TD VACCINES (2 - Td or Tdap) 05/08/2022   • MAMMOGRAM  05/14/2023   • DXA SCAN  05/14/2023   • INFLUENZA VACCINE  Completed        Assessment/Plan   CMS Preventative Services Quick Reference  Risk Factors Identified During Encounter  Chronic Pain   Depression/Dysphoria  Fall  Risk-High or Moderate  Immunizations Discussed/Encouraged (specific Immunizations; Shingrix  Obesity/Overweight   The above risks/problems have been discussed with the patient.  Follow up actions/plans if indicated are seen below in the Assessment/Plan Section.  Pertinent information has been shared with the patient in the After Visit Summary.    Diagnoses and all orders for this visit:    1. Essential hypertension      Hypertension: at goal. Evidence of target organ damage: none.  Encouraged to continue to work on diet and exercise plan.   Continue current medication    2. Hypothyroidism due to acquired atrophy of thyroid  Clinically and bio-chemically euthyroid.  Continue current medication.    3. Class 2 severe obesity with serious comorbidity and body mass index (BMI) of 37.0 to 37.9 in adult, unspecified obesity type ([facility])  As above.  Continue current medication    4. Vitamin D deficiency  Continue supplementation with monitoring.    5. Atrophic vaginitis  Continue current medication    6. Menopausal symptom    7. Healthcare maintenance  Patient has already received a flu shot along with a third dose of the Moderna COVID-19 vaccine this fall  Reminded that she is due for Shingrix.  Prescription emailed  We will arrange an updated mammogram in May  We will discuss an updated cologuard at her return  -     Zoster Vac Recomb Adjuvanted (Shingrix) 50 MCG/0.5ML reconstituted suspension; Inject 0.5 mL into the appropriate muscle as directed by prescriber See Admin Instructions. Repeat in 2-6 months  Dispense: 1 each; Refill: 1    8. Recurrent major depressive disorder, in partial remission (HCC)  Stable.  Supportive therapy.   Continue current medication.    9. Chronic pain of both knees  Reminded regarding symptomatic treatment.   Continue current medication    10. Osteopenia, unspecified location  Encouraged to continue to pursue weight bearing activities while exercising joint protection.  We will plan on updating  a DEXA scan next year        Follow Up:   Return in about 6 months (around 7/21/2022).     An After Visit Summary and PPPS were made available to the patient.

## 2022-01-22 VITALS
TEMPERATURE: 98.2 F | HEART RATE: 91 BPM | SYSTOLIC BLOOD PRESSURE: 124 MMHG | WEIGHT: 230 LBS | OXYGEN SATURATION: 97 % | DIASTOLIC BLOOD PRESSURE: 65 MMHG | RESPIRATION RATE: 14 BRPM | BODY MASS INDEX: 37.12 KG/M2

## 2022-01-22 RX ORDER — PHENTERMINE HYDROCHLORIDE 37.5 MG/1
37.5 CAPSULE ORAL EVERY MORNING
Qty: 30 CAPSULE | Refills: 5 | Status: SHIPPED | OUTPATIENT
Start: 2022-01-22 | End: 2022-07-21 | Stop reason: SDUPTHER

## 2022-01-22 RX ORDER — LOSARTAN POTASSIUM 50 MG/1
50 TABLET ORAL DAILY
Qty: 30 TABLET | Refills: 5 | Status: SHIPPED | OUTPATIENT
Start: 2022-01-22 | End: 2022-07-21 | Stop reason: SDUPTHER

## 2022-01-22 RX ORDER — LEVOTHYROXINE SODIUM 112 MCG
112 TABLET ORAL DAILY
Qty: 30 TABLET | Refills: 5 | Status: SHIPPED | OUTPATIENT
Start: 2022-01-22 | End: 2022-07-21 | Stop reason: SDUPTHER

## 2022-03-08 ENCOUNTER — OFFICE VISIT (OUTPATIENT)
Dept: FAMILY MEDICINE CLINIC | Facility: CLINIC | Age: 77
End: 2022-03-08

## 2022-03-08 VITALS
OXYGEN SATURATION: 100 % | HEIGHT: 66 IN | BODY MASS INDEX: 36.96 KG/M2 | TEMPERATURE: 97.6 F | DIASTOLIC BLOOD PRESSURE: 80 MMHG | HEART RATE: 103 BPM | SYSTOLIC BLOOD PRESSURE: 154 MMHG | WEIGHT: 230 LBS

## 2022-03-08 DIAGNOSIS — R30.0 DYSURIA: Primary | ICD-10-CM

## 2022-03-08 LAB
BILIRUB BLD-MCNC: NEGATIVE MG/DL
CLARITY, POC: ABNORMAL
COLOR UR: YELLOW
EXPIRATION DATE: ABNORMAL
GLUCOSE UR STRIP-MCNC: NEGATIVE MG/DL
LEUKOCYTE EST, POC: ABNORMAL
Lab: ABNORMAL
NITRITE UR-MCNC: NEGATIVE MG/ML
PH UR: 6 [PH] (ref 5–8)
PROT UR STRIP-MCNC: NEGATIVE MG/DL
RBC # UR STRIP: NEGATIVE /UL
SP GR UR: 1.01 (ref 1–1.03)
UROBILINOGEN UR QL: NORMAL

## 2022-03-08 PROCEDURE — 87086 URINE CULTURE/COLONY COUNT: CPT | Performed by: NURSE PRACTITIONER

## 2022-03-08 PROCEDURE — 81003 URINALYSIS AUTO W/O SCOPE: CPT | Performed by: NURSE PRACTITIONER

## 2022-03-08 PROCEDURE — 87077 CULTURE AEROBIC IDENTIFY: CPT | Performed by: NURSE PRACTITIONER

## 2022-03-08 PROCEDURE — 87186 SC STD MICRODIL/AGAR DIL: CPT | Performed by: NURSE PRACTITIONER

## 2022-03-08 PROCEDURE — 99213 OFFICE O/P EST LOW 20 MIN: CPT | Performed by: NURSE PRACTITIONER

## 2022-03-08 RX ORDER — FLUCONAZOLE 150 MG/1
150 TABLET ORAL DAILY
Qty: 3 TABLET | Refills: 0 | Status: SHIPPED | OUTPATIENT
Start: 2022-03-08 | End: 2022-04-25 | Stop reason: SDUPTHER

## 2022-03-08 RX ORDER — AMOXICILLIN AND CLAVULANATE POTASSIUM 875; 125 MG/1; MG/1
1 TABLET, FILM COATED ORAL 2 TIMES DAILY
Qty: 14 TABLET | Refills: 0 | Status: CANCELLED | OUTPATIENT
Start: 2022-03-08 | End: 2022-03-15

## 2022-03-08 RX ORDER — CIPROFLOXACIN 500 MG/1
500 TABLET, FILM COATED ORAL 2 TIMES DAILY
Qty: 14 TABLET | Refills: 0 | Status: SHIPPED | OUTPATIENT
Start: 2022-03-08 | End: 2022-03-15

## 2022-03-08 NOTE — PROGRESS NOTES
Chief Complaint  Dysuria    Subjective          Tennille Gasca is a 77 y.o. female who presents today to [facility] for initial evaluation for dysuria.    HPI:   Urinary Tract Infection   This is a new problem. The current episode started in the past 7 days. The problem occurs every urination. The problem has been unchanged. The quality of the pain is described as aching and burning. The pain is moderate. There has been no fever. Associated symptoms include frequency and urgency. Pertinent negatives include no chills, discharge, flank pain, hematuria or vomiting. She has tried increased fluids for the symptoms. The treatment provided mild relief. Her past medical history is significant for recurrent UTIs.         Objective     Problem List:  Patient Active Problem List   Diagnosis   • Osteopenia   • Hypothyroidism due to acquired atrophy of thyroid   • Recurrent major depressive disorder, in partial remission (HCC)   • Vitamin D deficiency   • Class 2 severe obesity with serious comorbidity and body mass index (BMI) of 37.0 to 37.9 in adult (HCC)   • Menopausal symptom   • Healthcare maintenance   • Bilateral knee pain   • Encounter for screening mammogram for breast cancer   • Encounter for immunization   • Asymptomatic cholelithiasis   • Atrophic vaginitis   • Other fatigue   • Essential hypertension       Allergy:   Allergies   Allergen Reactions   • Macrobid [Nitrofurantoin] Swelling   • Sulfa Antibiotics Rash        Discontinued Medications:  There are no discontinued medications.    Current Medications:   Current Outpatient Medications   Medication Sig Dispense Refill   • cholecalciferol (VITAMIN D3) 25 MCG (1000 UT) tablet Take 1 tablet by mouth Daily. 30 tablet 5   • conjugated estrogens (PREMARIN) 0.625 MG/GM vaginal cream Insert  into the vagina 3 (Three) Times a Week. 30 g 5   • Diclofenac Sodium (VOLTAREN) 1 % gel gel Apply 4 g topically to the appropriate area as  directed 4 (Four) Times a Day As Needed (joint pain). 500 g 5   • losartan (COZAAR) 50 MG tablet Take 1 tablet by mouth Daily. 30 tablet 5   • Methenamine-Sodium Salicylate (CYSTEX PO) Take  by mouth.     • phentermine 37.5 MG capsule Take 1 capsule by mouth Every Morning. 30 capsule 5   • Synthroid 112 MCG tablet Take 1 tablet by mouth Daily. 30 tablet 5   • vitamin B-12 (CYANOCOBALAMIN) 500 MCG tablet Take 1 tablet by mouth Daily. 30 tablet 5   • Zoster Vac Recomb Adjuvanted (Shingrix) 50 MCG/0.5ML reconstituted suspension Inject 0.5 mL into the appropriate muscle as directed by prescriber See Admin Instructions. Repeat in 2-6 months 1 each 1   • ciprofloxacin (Cipro) 500 MG tablet Take 1 tablet by mouth 2 (Two) Times a Day for 7 days. 14 tablet 0   • fluconazole (Diflucan) 150 MG tablet Take 1 tablet by mouth Daily. 3 tablet 0     No current facility-administered medications for this visit.       Past Medical History:  Past Medical History:   Diagnosis Date   • Depression    • Essential hypertension    • Hypothyroidism due to acquired atrophy of thyroid    • Osteopenia        Past Surgical History:  Past Surgical History:   Procedure Laterality Date   • COLONOSCOPY      about 10-12 years ago   • COLONOSCOPY N/A 6/20/2019    Procedure: COLONOSCOPY;  Surgeon: Kee Robert MD;  Location: [facility];  Service: Gastroenterology   • EYE SURGERY     • TUBAL ABDOMINAL LIGATION         Review of Systems:  Review of Systems   Constitutional: Negative for chills.   Gastrointestinal: Negative for vomiting.   Genitourinary: Positive for frequency and urgency. Negative for flank pain and hematuria.   All other systems reviewed and are negative.      Physical Exam:  Physical Exam  Vitals and nursing note reviewed.   Constitutional:       General: She is not in acute distress.     Appearance: Normal appearance. She is not ill-appearing.      Interventions: Face mask in place.   HENT:      Head: Normocephalic and  "atraumatic.   Cardiovascular:      Rate and Rhythm: Normal rate.   Pulmonary:      Effort: Pulmonary effort is normal. No respiratory distress.   Abdominal:      Palpations: Abdomen is soft.   Skin:     General: Skin is warm and dry.   Neurological:      Mental Status: She is alert and oriented to person, place, and time.   Psychiatric:         Mood and Affect: Mood normal.         Behavior: Behavior normal.         Vital Signs:   /80 (BP Location: Right arm, Patient Position: Sitting)   Pulse 103   Temp 97.6 °F (36.4 °C)   Ht 167.6 cm (66\")   Wt 104 kg (230 lb)   SpO2 100%   BMI 37.12 kg/m²      Lab Results:   Office Visit on 03/08/2022   Component Date Value Ref Range Status   • Color 03/08/2022 Yellow  Yellow, Straw, Dark Yellow, Leslie Final   • Clarity, UA 03/08/2022 Slightly Cloudy (A) Clear Final   • Specific Gravity  03/08/2022 1.015  1.005 - 1.030 Final   • pH, Urine 03/08/2022 6.0  5.0 - 8.0 Final   • Leukocytes 03/08/2022 Large (3+) (A) Negative Final   • Nitrite, UA 03/08/2022 Negative  Negative Final   • Protein, POC 03/08/2022 Negative  Negative mg/dL Final   • Glucose, UA 03/08/2022 Negative  Negative, 1000 mg/dL (3+) mg/dL Final   • Urobilinogen, UA 03/08/2022 Normal  Normal Final   • Bilirubin 03/08/2022 Negative  Negative Final   • Blood, UA 03/08/2022 Negative  Negative Final   • Lot Number 03/08/2022 [id number]   Final   • Expiration Date 03/08/2022 1/08/23   Final   Office Visit on 12/28/2021   Component Date Value Ref Range Status   • Color 12/28/2021 Yellow  Yellow, Straw, Dark Yellow, Leslie Final   • Clarity, UA 12/28/2021 Slightly Cloudy (A) Clear Final   • Specific Gravity  12/28/2021 1.015  1.005 - 1.030 Final   • pH, Urine 12/28/2021 6.0  5.0 - 8.0 Final   • Leukocytes 12/28/2021 Moderate (2+) (A) Negative Final   • Nitrite, UA 12/28/2021 Negative  Negative Final   • Protein, POC 12/28/2021 Negative  Negative mg/dL Final   • Glucose, UA 12/28/2021 Negative  Negative, 1000 " mg/dL (3+) mg/dL Final   • Ketones, UA 12/28/2021 Negative  Negative Final   • Urobilinogen, UA 12/28/2021 Normal  Normal Final   • Bilirubin 12/28/2021 Negative  Negative Final   • Blood, UA 12/28/2021 Negative  Negative Final   • Lot Number 12/28/2021 [id number]   Final   • Expiration Date 12/28/2021 1/08/2023   Final   • Urine Culture 12/28/2021 Final report (A)  Final   • Result 1 12/28/2021 Escherichia coli (A)  Final    Cefazolin <=4 ug/mL  Cefazolin with an CINTHYA <=16 predicts susceptibility to the oral agents  cefaclor, cefdinir, cefpodoxime, cefprozil, cefuroxime, cephalexin,  and loracarbef when used for therapy of uncomplicated urinary tract  infections due to E. coli, Klebsiella pneumoniae, and Proteus  mirabilis.  10,000-25,000 colony forming units per mL   • Susceptibility Testing 12/28/2021 Comment   Final          ** S = Susceptible; I = Intermediate; R = Resistant **                     P = Positive; N = Negative              MICS are expressed in micrograms per mL     Antibiotic                 RSLT#1    RSLT#2    RSLT#3    RSLT#4  Amoxicillin/Clavulanic Acid    S  Ampicillin                     S  Cefepime                       S  Ceftriaxone                    S  Cefuroxime                     S  Ciprofloxacin                  S  Ertapenem                      S  Gentamicin                     S  Imipenem                       S  Levofloxacin                   S  Meropenem                      S  Nitrofurantoin                 S  Piperacillin/Tazobactam        S  Tetracycline                   S  Tobramycin                     S  Trimethoprim/Sulfa             S   Office Visit on 10/07/2021   Component Date Value Ref Range Status   • Color 10/07/2021 Yellow  Yellow, Straw, Dark Yellow, Leslie Final   • Clarity, UA 10/07/2021 Clear  Clear Final   • Specific Gravity  10/07/2021 1.015  1.005 - 1.030 Final   • pH, Urine 10/07/2021 6.0  5.0 - 8.0 Final   • Leukocytes 10/07/2021 Large (3+) (A)  Negative Final   • Nitrite, UA 10/07/2021 Negative  Negative Final   • Protein, POC 10/07/2021 Negative  Negative mg/dL Final   • Glucose, UA 10/07/2021 Negative  Negative, 1000 mg/dL (3+) mg/dL Final   • Ketones, UA 10/07/2021 Negative  Negative Final   • Urobilinogen, UA 10/07/2021 Normal  Normal Final   • Bilirubin 10/07/2021 Negative  Negative Final   • Blood, UA 10/07/2021 2+ (A) Negative Final   • Urine Culture 10/07/2021 50,000 CFU/mL Escherichia coli (A)  Final   Lab on 10/01/2021   Component Date Value Ref Range Status   • COVID19 10/01/2021 Not Detected  Not Detected - Ref. Range Final   Lab on 09/29/2021   Component Date Value Ref Range Status   • Glucose 09/29/2021 83  65 - 99 mg/dL Final   • BUN 09/29/2021 17  8 - 23 mg/dL Final   • Creatinine 09/29/2021 1.07 (A) 0.57 - 1.00 mg/dL Final   • Sodium 09/29/2021 137  136 - 145 mmol/L Final   • Potassium 09/29/2021 5.3 (A) 3.5 - 5.2 mmol/L Final   • Chloride 09/29/2021 103  98 - 107 mmol/L Final   • CO2 09/29/2021 23.6  22.0 - 29.0 mmol/L Final   • Calcium 09/29/2021 9.5  8.6 - 10.5 mg/dL Final   • Total Protein 09/29/2021 7.4  6.0 - 8.5 g/dL Final   • Albumin 09/29/2021 4.40  3.50 - 5.20 g/dL Final   • ALT (SGPT) 09/29/2021 14  1 - 33 U/L Final   • AST (SGOT) 09/29/2021 20  1 - 32 U/L Final   • Alkaline Phosphatase 09/29/2021 95  39 - 117 U/L Final   • Total Bilirubin 09/29/2021 0.4  0.0 - 1.2 mg/dL Final   • eGFR Non African Amer 09/29/2021 50 (A) >60 mL/min/1.73 Final   • Globulin 09/29/2021 3.0  gm/dL Final   • A/G Ratio 09/29/2021 1.5  g/dL Final   • BUN/Creatinine Ratio 09/29/2021 15.9  7.0 - 25.0 Final   • Anion Gap 09/29/2021 10.4  5.0 - 15.0 mmol/L Final   • Total Cholesterol 09/29/2021 180  0 - 200 mg/dL Final   • Triglycerides 09/29/2021 71  0 - 150 mg/dL Final   • HDL Cholesterol 09/29/2021 50  40 - 60 mg/dL Final   • LDL Cholesterol  09/29/2021 117 (A) 0 - 100 mg/dL Final   • VLDL Cholesterol 09/29/2021 13  5 - 40 mg/dL Final   • LDL/HDL Ratio  09/29/2021 2.32   Final   • TSH 09/29/2021 4.010  0.270 - 4.200 uIU/mL Final   • 25 Hydroxy, Vitamin D 09/29/2021 56.7  30.0 - 100.0 ng/ml Final   • WBC 09/29/2021 5.90  3.40 - 10.80 10*3/mm3 Final   • RBC 09/29/2021 4.21  3.77 - 5.28 10*6/mm3 Final   • Hemoglobin 09/29/2021 12.5  12.0 - 15.9 g/dL Final   • Hematocrit 09/29/2021 39.0  34.0 - 46.6 % Final   • MCV 09/29/2021 92.6  79.0 - 97.0 fL Final   • MCH 09/29/2021 29.7  26.6 - 33.0 pg Final   • MCHC 09/29/2021 32.1  31.5 - 35.7 g/dL Final   • RDW 09/29/2021 12.9  12.3 - 15.4 % Final   • RDW-SD 09/29/2021 43.9  37.0 - 54.0 fl Final   • MPV 09/29/2021 10.9  6.0 - 12.0 fL Final   • Platelets 09/29/2021 409  140 - 450 10*3/mm3 Final   • Neutrophil % 09/29/2021 59.6  42.7 - 76.0 % Final   • Lymphocyte % 09/29/2021 24.7  19.6 - 45.3 % Final   • Monocyte % 09/29/2021 9.2  5.0 - 12.0 % Final   • Eosinophil % 09/29/2021 4.7  0.3 - 6.2 % Final   • Basophil % 09/29/2021 1.5  0.0 - 1.5 % Final   • Immature Grans % 09/29/2021 0.3  0.0 - 0.5 % Final   • Neutrophils, Absolute 09/29/2021 3.51  1.70 - 7.00 10*3/mm3 Final   • Lymphocytes, Absolute 09/29/2021 1.46  0.70 - 3.10 10*3/mm3 Final   • Monocytes, Absolute 09/29/2021 0.54  0.10 - 0.90 10*3/mm3 Final   • Eosinophils, Absolute 09/29/2021 0.28  0.00 - 0.40 10*3/mm3 Final   • Basophils, Absolute 09/29/2021 0.09  0.00 - 0.20 10*3/mm3 Final   • Immature Grans, Absolute 09/29/2021 0.02  0.00 - 0.05 10*3/mm3 Final   • nRBC 09/29/2021 0.0  0.0 - 0.2 /100 WBC Final       EKG Results:  No orders to display       Imaging Results:  No Images in the past 120 days found..              Assessment and Plan   Diagnoses and all orders for this visit:    1. Dysuria (Primary)  Comments:  likely UTI based on patient's medical hx of UTIs  U/A + leuks  will culture urine  Orders:  -     POCT urinalysis dipstick, automated  -     Urine Culture - Urine, Urine, Clean Catch; Future  -     Urine Culture - Urine, Urine, Clean Catch  -      ciprofloxacin (Cipro) 500 MG tablet; Take 1 tablet by mouth 2 (Two) Times a Day for 7 days.  Dispense: 14 tablet; Refill: 0  -     fluconazole (Diflucan) 150 MG tablet; Take 1 tablet by mouth Daily.  Dispense: 3 tablet; Refill: 0        Meds ordered during this visit:  New Medications Ordered This Visit   Medications   • ciprofloxacin (Cipro) 500 MG tablet     Sig: Take 1 tablet by mouth 2 (Two) Times a Day for 7 days.     Dispense:  14 tablet     Refill:  0   • fluconazole (Diflucan) 150 MG tablet     Sig: Take 1 tablet by mouth Daily.     Dispense:  3 tablet     Refill:  0       Patient Instructions:  Patient instructions given for the following visit diagnosis:    ICD-10-CM ICD-9-CM   1. Dysuria  R30.0 788.1       Follow Up   Return if symptoms worsen or fail to improve.        This document has been electronically signed by MARCELO Monk  March 8, 2022 13:57 EST    Patient was given instructions and counseling regarding her condition or for health maintenance advice. Please see specific information pulled into the AVS if appropriate.     Part of this note may be an electronic transcription/translation of spoken language to printed text using the Dragon Dictation System.

## 2022-03-10 LAB — BACTERIA SPEC AEROBE CULT: ABNORMAL

## 2022-03-10 NOTE — PROGRESS NOTES
Please call patient and let her know that her urine was positive for bacterial growth. She was prescribed cipro at her appointment. She should continue taking as prescribed until finished.

## 2022-04-25 ENCOUNTER — LAB (OUTPATIENT)
Dept: FAMILY MEDICINE CLINIC | Facility: CLINIC | Age: 77
End: 2022-04-25

## 2022-04-25 ENCOUNTER — TELEPHONE (OUTPATIENT)
Dept: FAMILY MEDICINE CLINIC | Facility: CLINIC | Age: 77
End: 2022-04-25

## 2022-04-25 DIAGNOSIS — R30.0 DYSURIA: Primary | ICD-10-CM

## 2022-04-25 DIAGNOSIS — R30.0 DYSURIA: ICD-10-CM

## 2022-04-25 LAB
BILIRUB BLD-MCNC: NEGATIVE MG/DL
CLARITY, POC: CLEAR
COLOR UR: YELLOW
EXPIRATION DATE: NORMAL
GLUCOSE UR STRIP-MCNC: NEGATIVE MG/DL
KETONES UR QL: NEGATIVE
LEUKOCYTE EST, POC: NEGATIVE
Lab: NORMAL
NITRITE UR-MCNC: NEGATIVE MG/ML
PH UR: 6 [PH] (ref 5–8)
PROT UR STRIP-MCNC: NEGATIVE MG/DL
RBC # UR STRIP: NEGATIVE /UL
SP GR UR: 1.01 (ref 1–1.03)
UROBILINOGEN UR QL: NORMAL

## 2022-04-25 PROCEDURE — 81003 URINALYSIS AUTO W/O SCOPE: CPT | Performed by: GENERAL PRACTICE

## 2022-04-25 RX ORDER — CEFDINIR 300 MG/1
300 CAPSULE ORAL 2 TIMES DAILY
Qty: 10 CAPSULE | Refills: 0 | Status: SHIPPED | OUTPATIENT
Start: 2022-04-25 | End: 2022-04-25

## 2022-04-25 RX ORDER — FLUCONAZOLE 150 MG/1
150 TABLET ORAL DAILY
Qty: 3 TABLET | Refills: 0 | Status: SHIPPED | OUTPATIENT
Start: 2022-04-25 | End: 2022-07-21

## 2022-04-25 NOTE — TELEPHONE ENCOUNTER
Pt is aware of this information.       ----- Message from Alfonso Rubi MD sent at 4/25/2022  1:06 PM EDT -----  Emailed script for cefdinir  If she can drop off a urine for culture here or at [facility] first it might be helpful    ----- Message -----  From: Caroline Gacria MA  Sent: 4/25/2022  11:45 AM EDT  To: Alfonso Rubi MD    Pt called and stated that she has a UTI infection. She wanted to know if you could send her in some antibiotics?

## 2022-04-26 NOTE — PROGRESS NOTES
Spoke with pt about the following per Dr. Rubi. [facility]      -- Please let her know that her urine is normal She may have a yeast infection - I will send fluconazole

## 2022-05-16 ENCOUNTER — APPOINTMENT (OUTPATIENT)
Dept: MAMMOGRAPHY | Facility: HOSPITAL | Age: 77
End: 2022-05-16

## 2022-06-08 ENCOUNTER — APPOINTMENT (OUTPATIENT)
Dept: MAMMOGRAPHY | Facility: HOSPITAL | Age: 77
End: 2022-06-08

## 2022-07-08 ENCOUNTER — HOSPITAL ENCOUNTER (OUTPATIENT)
Dept: MAMMOGRAPHY | Facility: HOSPITAL | Age: 77
Discharge: HOME | End: 2022-07-08
Admitting: GENERAL PRACTICE

## 2022-07-08 DIAGNOSIS — Z00.00 HEALTHCARE MAINTENANCE: ICD-10-CM

## 2022-07-08 DIAGNOSIS — Z12.31 ENCOUNTER FOR SCREENING MAMMOGRAM FOR BREAST CANCER: ICD-10-CM

## 2022-07-08 PROCEDURE — 77067 SCR MAMMO BI INCL CAD: CPT | Performed by: RADIOLOGY

## 2022-07-08 PROCEDURE — 77067 SCR MAMMO BI INCL CAD: CPT

## 2022-07-08 PROCEDURE — 77063 BREAST TOMOSYNTHESIS BI: CPT | Performed by: RADIOLOGY

## 2022-07-08 PROCEDURE — 77063 BREAST TOMOSYNTHESIS BI: CPT

## 2022-07-21 ENCOUNTER — OFFICE VISIT (OUTPATIENT)
Dept: FAMILY MEDICINE CLINIC | Facility: CLINIC | Age: 77
End: 2022-07-21

## 2022-07-21 VITALS
BODY MASS INDEX: 36.32 KG/M2 | RESPIRATION RATE: 14 BRPM | WEIGHT: 226 LBS | OXYGEN SATURATION: 99 % | HEART RATE: 97 BPM | SYSTOLIC BLOOD PRESSURE: 128 MMHG | DIASTOLIC BLOOD PRESSURE: 72 MMHG | HEIGHT: 66 IN | TEMPERATURE: 98.3 F

## 2022-07-21 DIAGNOSIS — F33.41 RECURRENT MAJOR DEPRESSIVE DISORDER, IN PARTIAL REMISSION: ICD-10-CM

## 2022-07-21 DIAGNOSIS — L98.9 SKIN LESION: ICD-10-CM

## 2022-07-21 DIAGNOSIS — E55.9 VITAMIN D DEFICIENCY: ICD-10-CM

## 2022-07-21 DIAGNOSIS — N95.1 MENOPAUSAL SYMPTOM: ICD-10-CM

## 2022-07-21 DIAGNOSIS — I10 ESSENTIAL HYPERTENSION: Primary | ICD-10-CM

## 2022-07-21 DIAGNOSIS — N95.2 ATROPHIC VAGINITIS: ICD-10-CM

## 2022-07-21 DIAGNOSIS — M25.562 CHRONIC PAIN OF BOTH KNEES: ICD-10-CM

## 2022-07-21 DIAGNOSIS — M25.561 CHRONIC PAIN OF BOTH KNEES: ICD-10-CM

## 2022-07-21 DIAGNOSIS — E03.4 HYPOTHYROIDISM DUE TO ACQUIRED ATROPHY OF THYROID: ICD-10-CM

## 2022-07-21 DIAGNOSIS — G89.29 CHRONIC PAIN OF BOTH KNEES: ICD-10-CM

## 2022-07-21 DIAGNOSIS — M85.80 OSTEOPENIA, UNSPECIFIED LOCATION: ICD-10-CM

## 2022-07-21 DIAGNOSIS — Z00.00 HEALTHCARE MAINTENANCE: ICD-10-CM

## 2022-07-21 DIAGNOSIS — E66.01 CLASS 2 SEVERE OBESITY WITH SERIOUS COMORBIDITY AND BODY MASS INDEX (BMI) OF 37.0 TO 37.9 IN ADULT, UNSPECIFIED OBESITY TYPE: ICD-10-CM

## 2022-07-21 PROCEDURE — 99214 OFFICE O/P EST MOD 30 MIN: CPT | Performed by: GENERAL PRACTICE

## 2022-07-21 RX ORDER — LOSARTAN POTASSIUM 50 MG/1
50 TABLET ORAL DAILY
Qty: 30 TABLET | Refills: 5 | Status: SHIPPED | OUTPATIENT
Start: 2022-07-21 | End: 2022-11-17 | Stop reason: SDUPTHER

## 2022-07-21 RX ORDER — PHENTERMINE HYDROCHLORIDE 37.5 MG/1
37.5 CAPSULE ORAL EVERY MORNING
Qty: 30 CAPSULE | Refills: 5 | Status: SHIPPED | OUTPATIENT
Start: 2022-07-21 | End: 2022-11-17 | Stop reason: SDUPTHER

## 2022-07-21 RX ORDER — LEVOTHYROXINE SODIUM 112 MCG
112 TABLET ORAL DAILY
Qty: 30 TABLET | Refills: 5 | Status: SHIPPED | OUTPATIENT
Start: 2022-07-21 | End: 2022-11-17 | Stop reason: SDUPTHER

## 2022-07-21 NOTE — PROGRESS NOTES
Subjective   Tennille Gasca is a 77 y.o. female.     Chief Complaint  She returns for a scheduled reassessment of multiple medical problems including essential hypertension, hypothyroidism, chronic knee pain, chronic depression, and a recent skin lesion    History of Present Illness     Skin Lesion  She gives an approximate 2 week history of a spot over her right inner calf.  This appeared suddenly and has not changed significantly.  To date there have been no associated symptoms.    Essential Hypertension  She remains on losartan 50 nightly with good control of her blood pressure at home and no apparent side effects. She continues to deny any chest pain, palpitations, lightheadedness, shortness of breath, or swelling of the ankles, and there is no history of any changes in her vision, strength, or sensation    Hypothyroidism  She admits to fatigue, and struggles with her weight. The hypothyroidism is due to Hashimoto's disease. She remains on levothyroxine 112 daily.  She has had no recent labs    Knee Pain  She continues to have intermittent knee pain as previously described. There has been no change in the quality nor any new associated symptoms.  There is no history of any giving way or locking. The pain is aggravated by prolonged weightbearing and with ladders. Evaluation to date: none. Treatment to date: She is using topical diclofenac with a significant improvement and no apparent side effects    Depression  She continues to experience intermittent nervousness, and worrying.  There is no history of any depression at present and she continues to deny any anhedonia, difficulty concentrating, impaired memory,  or suicidal thoughts.  With melatonin she continues to average 7-8 hours of sleep nightly.    The following portions of the patient's history were reviewed and updated as appropriate: allergies, current medications, past medical history, past social history and problem list.    Review of Systems    Constitutional: Positive for fatigue. Negative for appetite change, chills, fever and unexpected weight change.   HENT: Negative for congestion, ear pain, rhinorrhea, sneezing and sore throat.    Eyes: Negative for visual disturbance.   Respiratory: Negative for cough, shortness of breath and wheezing.    Cardiovascular: Negative for chest pain, palpitations and leg swelling.   Gastrointestinal: Negative for abdominal pain, blood in stool, constipation, diarrhea, nausea and vomiting.   Genitourinary: Negative for dysuria and hematuria.   Musculoskeletal: Positive for arthralgias. Negative for back pain, joint swelling and myalgias.   Skin: Negative for rash.        Lesion left calf   Neurological: Negative for weakness, numbness and headaches.   Psychiatric/Behavioral: Negative for dysphoric mood, sleep disturbance and suicidal ideas. The patient is nervous/anxious.      Objective   Physical Exam  Constitutional:       General: She is not in acute distress.     Appearance: Normal appearance. She is well-developed. She is not diaphoretic.      Comments: Bright and in good spirits.  Mild antalgic gait.  No apparent distress at rest. No pallor, jaundice, diaphoresis, or cyanosis.     HENT:      Head: Atraumatic.      Right Ear: Tympanic membrane, ear canal and external ear normal.      Left Ear: Tympanic membrane, ear canal and external ear normal.   Eyes:      Conjunctiva/sclera: Conjunctivae normal.   Neck:      Thyroid: No thyroid mass or thyromegaly.      Vascular: No carotid bruit or JVD.      Trachea: Trachea normal. No tracheal deviation.   Cardiovascular:      Rate and Rhythm: Normal rate and regular rhythm.      Heart sounds: Normal heart sounds, S1 normal and S2 normal. No murmur heard.    No gallop.   Pulmonary:      Effort: Pulmonary effort is normal.      Breath sounds: Normal breath sounds.   Chest:   Breasts:      Right: No supraclavicular adenopathy.      Left: No supraclavicular adenopathy.        Abdominal:      General: Bowel sounds are normal. There is no distension.      Palpations: There is no hepatomegaly.   Musculoskeletal:      Right lower leg: No edema.      Left lower leg: No edema.   Lymphadenopathy:      Head:      Right side of head: No submental, submandibular, tonsillar, preauricular, posterior auricular or occipital adenopathy.      Left side of head: No submental, submandibular, tonsillar, preauricular, posterior auricular or occipital adenopathy.      Cervical: No cervical adenopathy.      Upper Body:      Right upper body: No supraclavicular adenopathy.      Left upper body: No supraclavicular adenopathy.   Skin:     General: Skin is warm.      Coloration: Skin is not cyanotic, jaundiced or pale.      Findings: Lesion (12 x 6 mm erythematous papule right mid medial calf with central keratinous cap.  Quite firm.  No tenderness) present. No rash.      Nails: There is no clubbing.   Neurological:      Mental Status: She is alert and oriented to person, place, and time.      Cranial Nerves: No cranial nerve deficit.      Motor: No tremor.      Coordination: Coordination normal.      Gait: Gait normal.   Psychiatric:         Attention and Perception: Attention normal.         Mood and Affect: Mood normal.         Speech: Speech normal.         Behavior: Behavior normal.         Thought Content: Thought content normal.       Assessment & Plan   Problems Addressed this Visit        Cardiac and Vasculature    Essential hypertension   Hypertension: at goal. Evidence of target organ damage: none.  Encouraged to continue to work on diet and exercise plan.   Continue current medication    Relevant Medications    losartan (COZAAR) 50 MG tablet       Endocrine and Metabolic    Class 2 severe obesity with serious comorbidity and body mass index (BMI) of 37.0 to 37.9 in adult (HCC)  As above.   Continue current medication.    Relevant Medications    phentermine 37.5 MG capsule    Hypothyroidism due  to acquired atrophy of thyroid  Clinically euthyroid.  Continue current medication.  Updated labs will be drawn at her return.    Relevant Medications    Synthroid 112 MCG tablet    Vitamin D deficiency       Genitourinary and Reproductive     Atrophic vaginitis    Relevant Medications    conjugated estrogens (PREMARIN) 0.625 MG/GM vaginal cream (Start on 7/22/2022)    Menopausal symptom    Relevant Medications    conjugated estrogens (PREMARIN) 0.625 MG/GM vaginal cream (Start on 7/22/2022)       Health Encounters    Healthcare maintenance  Recommended a second COVID-19 booster (fourth dose) along with an updated Tdap  Reminded to get a flu shot when available  We will discuss an updated cologuard at her return       Mental Health    Recurrent major depressive disorder, in partial remission (HCC)    Stable.  Supportive therapy.   Encouraged to report if any worse or if any new symptoms or concerns.           Musculoskeletal and Injuries    Bilateral knee pain  Reminded regarding symptomatic treatment.   Continue current medication    Relevant Medications    Diclofenac Sodium (VOLTAREN) 1 % gel gel    Osteopenia       Skin    Skin lesion  Right mid medial calf.  Appearance suggestive of a keratoacanthoma  Reviewed options and agreed on excisional biopsy.  This will be arranged with Ijeoma Jones PA-C  Encouraged to report if any worse or if any new associated symptoms in the meantime    Relevant Medications    Diclofenac Sodium (VOLTAREN) 1 % gel gel      Diagnoses       Codes Comments    Essential hypertension    -  Primary ICD-10-CM: I10  ICD-9-CM: 401.9     Class 2 severe obesity with serious comorbidity and body mass index (BMI) of 37.0 to 37.9 in adult, unspecified obesity type (HCC)     ICD-10-CM: E66.01, Z68.37  ICD-9-CM: 278.01, V85.37     Hypothyroidism due to acquired atrophy of thyroid     ICD-10-CM: E03.4  ICD-9-CM: 244.8, 246.8     Vitamin D deficiency     ICD-10-CM: E55.9  ICD-9-CM: 268.9     Menopausal  symptom     ICD-10-CM: N95.1  ICD-9-CM: 627.2     Healthcare maintenance     ICD-10-CM: Z00.00  ICD-9-CM: V70.0     Recurrent major depressive disorder, in partial remission (HCC)     ICD-10-CM: F33.41  ICD-9-CM: 296.35     Chronic pain of both knees     ICD-10-CM: M25.561, M25.562, G89.29  ICD-9-CM: 719.46, 338.29     Osteopenia, unspecified location     ICD-10-CM: M85.80  ICD-9-CM: 733.90     Atrophic vaginitis     ICD-10-CM: N95.2  ICD-9-CM: 627.3     Skin lesion     ICD-10-CM: L98.9  ICD-9-CM: 709.9

## 2022-09-06 ENCOUNTER — PROCEDURE VISIT (OUTPATIENT)
Dept: FAMILY MEDICINE CLINIC | Facility: CLINIC | Age: 77
End: 2022-09-06

## 2022-09-06 VITALS
WEIGHT: 226 LBS | DIASTOLIC BLOOD PRESSURE: 74 MMHG | SYSTOLIC BLOOD PRESSURE: 118 MMHG | HEART RATE: 97 BPM | TEMPERATURE: 97.1 F | BODY MASS INDEX: 36.32 KG/M2 | HEIGHT: 66 IN | OXYGEN SATURATION: 98 %

## 2022-09-06 DIAGNOSIS — I10 ESSENTIAL HYPERTENSION: Chronic | ICD-10-CM

## 2022-09-06 DIAGNOSIS — D48.5 NEOPLASM OF UNCERTAIN BEHAVIOR OF SKIN: Primary | ICD-10-CM

## 2022-09-06 DIAGNOSIS — E03.4 HYPOTHYROIDISM DUE TO ACQUIRED ATROPHY OF THYROID: Chronic | ICD-10-CM

## 2022-09-06 PROCEDURE — 99213 OFFICE O/P EST LOW 20 MIN: CPT | Performed by: PHYSICIAN ASSISTANT

## 2022-09-06 PROCEDURE — 11602 EXC TR-EXT MAL+MARG 1.1-2 CM: CPT | Performed by: PHYSICIAN ASSISTANT

## 2022-09-06 RX ORDER — UBIDECARENONE 100 MG
100 CAPSULE ORAL DAILY
COMMUNITY

## 2022-09-07 DIAGNOSIS — E03.4 HYPOTHYROIDISM DUE TO ACQUIRED ATROPHY OF THYROID: ICD-10-CM

## 2022-09-07 DIAGNOSIS — E55.9 VITAMIN D DEFICIENCY: ICD-10-CM

## 2022-09-07 DIAGNOSIS — F33.41 RECURRENT MAJOR DEPRESSIVE DISORDER, IN PARTIAL REMISSION: ICD-10-CM

## 2022-09-07 DIAGNOSIS — R53.83 OTHER FATIGUE: ICD-10-CM

## 2022-09-07 DIAGNOSIS — M85.80 OSTEOPENIA, UNSPECIFIED LOCATION: ICD-10-CM

## 2022-09-07 DIAGNOSIS — I10 ESSENTIAL HYPERTENSION: Primary | ICD-10-CM

## 2022-09-10 NOTE — PATIENT INSTRUCTIONS
Wound Care, Adult  Taking care of your wound properly can help to prevent pain, infection, and scarring. It can also help your wound heal more quickly. Follow instructions from your health care provider about how to care for your wound.  Supplies needed:  Soap and water.  Wound cleanser.  Gauze.  If needed, a clean bandage (dressing) or other type of wound dressing material to cover or place in the wound. Follow your health care provider's instructions about what dressing supplies to use.  Cream or ointment to apply to the wound, if told by your health care provider.  How to care for your wound  Cleaning the wound  Ask your health care provider how to clean the wound. This may include:  Using mild soap and water or a wound cleanser.  Using a clean gauze to pat the wound dry after cleaning it. Do not rub or scrub the wound.  Dressing care  Wash your hands with soap and water for at least 20 seconds before and after you change the dressing. If soap and water are not available, use hand .  Change your dressing as told by your health care provider. This may include:  Cleaning or rinsing out (irrigating) the wound.  Placing a dressing over the wound or in the wound (packing).  Covering the wound with an outer dressing.  Leave any stitches (sutures), skin glue, or adhesive strips in place. These skin closures may need to stay in place for 2 weeks or longer. If adhesive strip edges start to loosen and curl up, you may trim the loose edges. Do not remove adhesive strips completely unless your health care provider tells you to do that.  Ask your health care provider when you can leave the wound uncovered.  Checking for infection  Check your wound area every day for signs of infection. Check for:  More redness, swelling, or pain.  Fluid or blood.  Warmth.  Pus or a bad smell.    Follow these instructions at home  Medicines  If you were prescribed an antibiotic medicine, cream, or ointment, take or apply it as told by  your health care provider. Do not stop using the antibiotic even if your condition improves.  If you were prescribed pain medicine, take it 30 minutes before you do any wound care or as told by your health care provider.  Take over-the-counter and prescription medicines only as told by your health care provider.  Eating and drinking  Eat a diet that includes protein, vitamin A, vitamin C, and other nutrient-rich foods to help the wound heal.  Foods rich in protein include meat, fish, eggs, dairy, beans, and nuts.  Foods rich in vitamin A include carrots and dark green, leafy vegetables.  Foods rich in vitamin C include citrus fruits, tomatoes, broccoli, and peppers.  Drink enough fluid to keep your urine pale yellow.  General instructions  Do not take baths, swim, use a hot tub, or do anything that would put the wound underwater until your health care provider approves. Ask your health care provider if you may take showers. You may only be allowed to take sponge baths.  Do not scratch or pick at the wound. Keep it covered as told by your health care provider.  Return to your normal activities as told by your health care provider. Ask your health care provider what activities are safe for you.  Protect your wound from the sun when you are outside for the first 6 months, or for as long as told by your health care provider. Cover up the scar area or apply sunscreen that has an SPF of at least 30.  Do not use any products that contain nicotine or tobacco, such as cigarettes, e-cigarettes, and chewing tobacco. These may delay wound healing. If you need help quitting, ask your health care provider.  Keep all follow-up visits as told by your health care provider. This is important.  Contact a health care provider if:  You received a tetanus shot and you have swelling, severe pain, redness, or bleeding at the injection site.  Your pain is not controlled with medicine.  You have any of these signs of infection:  More  redness, swelling, or pain around the wound.  Fluid or blood coming from the wound.  Warmth coming from the wound.  Pus or a bad smell coming from the wound.  A fever or chills.  You are nauseous or you vomit.  You are dizzy.  Get help right away if:  You have a red streak of skin near the area around your wound.  Your wound has been closed with staples, sutures, skin glue, or adhesive strips and it begins to open up and separate.  Your wound is bleeding, and the bleeding does not stop with gentle pressure.  You have a rash.  You faint.  You have trouble breathing.  These symptoms may represent a serious problem that is an emergency. Do not wait to see if the symptoms will go away. Get medical help right away. Call your local emergency services (911 in the [place] Do not drive yourself to the hospital.  Summary  Always wash your hands with soap and water for at least 20 seconds before and after changing your dressing.  Change your dressing as told by your health care provider.  To help with healing, eat foods that are rich in protein, vitamin A, vitamin C, and other nutrients.  Check your wound every day for signs of infection. Contact your health care provider if you suspect that your wound is infected.  This information is not intended to replace advice given to you by your health care provider. Make sure you discuss any questions you have with your health care provider.  Document Revised: 10/02/2020 Document Reviewed: 10/02/2020  [employer]vier Patient Education © 2021 [employer]

## 2022-09-10 NOTE — PROGRESS NOTES
"Subjective   Tennille Gasca is a 77 y.o. female.       Chief Complaint -skin lesion    History of Present Illness -    ROS    Skin lesion-  She complains of a skin lesion on her right lower leg.  She states that she noticed it about a month ago and it has significantly grown in size since that time.    Hypertension-  Controlled with losartan    Hypothyroidism-stable with Synthroid 112 mcg daily    The following portions of the patient's history were reviewed and updated as appropriate: allergies, current medications, past family history, past medical history, past social history, past surgical history and problem list.    Review of Systems    Objective  Vital signs:  /74   Pulse 97   Temp 97.1 °F (36.2 °C) (Temporal)   Ht 167.6 cm (66\")   Wt 103 kg (226 lb)   SpO2 98%   BMI 36.48 kg/m²     Physical Exam  Vitals and nursing note reviewed.   Constitutional:       Appearance: Normal appearance. She is well-developed.   Eyes:      Extraocular Movements: Extraocular movements intact.      Conjunctiva/sclera: Conjunctivae normal.   Cardiovascular:      Rate and Rhythm: Normal rate and regular rhythm.      Heart sounds: Normal heart sounds. No murmur heard.  Pulmonary:      Effort: Pulmonary effort is normal. No respiratory distress.      Breath sounds: Normal breath sounds. No wheezing.   Musculoskeletal:         General: No tenderness.   Skin:     General: Skin is warm and dry.      Findings: No rash.      Comments: 12 x 6 mm erythematous papule right mid medial calf with central keratinous cap.  Quite firm.  No tenderness.   Neurological:      Mental Status: She is alert and oriented to person, place, and time.   Psychiatric:         Mood and Affect: Mood normal.         Behavior: Behavior normal.         Thought Content: Thought content normal.         The following data was reviewed by: CONY Berman on 09/06/2022:  CMP    CMP 9/29/21   Glucose 83   BUN 17   Creatinine 1.07 (A)   eGFR Non  " Am 50 (A)   Sodium 137   Potassium 5.3 (A)   Chloride 103   Calcium 9.5   Albumin 4.40   Total Bilirubin 0.4   Alkaline Phosphatase 95   AST (SGOT) 20   ALT (SGPT) 14   (A) Abnormal value            CBC w/diff    CBC w/Diff 9/29/21   WBC 5.90   RBC 4.21   Hemoglobin 12.5   Hematocrit 39.0   MCV 92.6   MCH 29.7   MCHC 32.1   RDW 12.9   Platelets 409   Neutrophil Rel % 59.6   Immature Granulocyte Rel % 0.3   Lymphocyte Rel % 24.7   Monocyte Rel % 9.2   Eosinophil Rel % 4.7   Basophil Rel % 1.5           TSH    TSH 9/29/21   TSH 4.010                  Assessment & Plan     Diagnoses and all orders for this visit:    1. Neoplasm of uncertain behavior of skin (Primary)  Comments:  Skin lesion excised from right lower extremity today and sent for pathology  Orders:  -     Tissue Pathology Exam; Future  -     Tissue Pathology Exam    2. Essential hypertension  Comments:  Continue to monitor BP and pulse daily  Continue losartan    3. Hypothyroidism due to acquired atrophy of thyroid  Comments:  Continue Synthroid 112 mcg daily      Procedure: Excision of skin lesion right leg  Provider: Ijeoma Jones PA-C  Indication: Skin lesion right lower extremity growing larger and suspicious for malignancy  Timeout was taken to verify correct patient procedure and location  Description: The right lower extremity was prepped and draped in sterile fashion.  1% lidocaine with epinephrine was used for local anesthesia.  The skin lesion measuring approximately 12 x 6 mm was excised and sent for pathology.  The wound was closed using 4-0 Ethilon suture in simple interrupted fashion.  Pressure was held and sterile dressings were placed.  No complications  Estimated blood loss  Patient tolerated procedure well  Plan postop follow-up in office in 10 days        Patient was given instructions and counseling regarding his condition or for health maintenance advice. Please see specific information pulled into the AVS if appropriate      This  document has been electronically signed by:  Ijeoma Jones PA-C

## 2022-09-16 ENCOUNTER — OFFICE VISIT (OUTPATIENT)
Dept: FAMILY MEDICINE CLINIC | Facility: CLINIC | Age: 77
End: 2022-09-16

## 2022-09-16 VITALS
DIASTOLIC BLOOD PRESSURE: 76 MMHG | BODY MASS INDEX: 36.32 KG/M2 | TEMPERATURE: 98.3 F | OXYGEN SATURATION: 98 % | HEART RATE: 76 BPM | SYSTOLIC BLOOD PRESSURE: 130 MMHG | HEIGHT: 66 IN | WEIGHT: 226 LBS

## 2022-09-16 DIAGNOSIS — C44.722 SQUAMOUS CELL CARCINOMA, LEG, RIGHT: Primary | ICD-10-CM

## 2022-09-16 DIAGNOSIS — I10 ESSENTIAL HYPERTENSION: Chronic | ICD-10-CM

## 2022-09-16 PROCEDURE — 99213 OFFICE O/P EST LOW 20 MIN: CPT | Performed by: PHYSICIAN ASSISTANT

## 2022-09-21 NOTE — PROGRESS NOTES
"Subjective   Tennille Gasca is a 77 y.o. female.       Chief Complaint -postop visit/hypertension    History of Present Illness -    ROS    Postop-  Patient had skin lesion excised from right leg on 9/6/2022.  She reports no complications and is here today to go over pathology results and have sutures removed.    9/6/2022 pathology report of skin lesion right leg revealed invasive, well-differentiated squamous cell carcinoma, keratoacanthoma Type, margins uninvolved.    Hypertension-  Controlled with losartan.  Patient states that blood pressure has remained well controlled despite the use of phentermine.      The following portions of the patient's history were reviewed and updated as appropriate: allergies, current medications, past family history, past medical history, past social history, past surgical history and problem list.    Review of Systems    Objective  Vital signs:  /76   Pulse 76   Temp 98.3 °F (36.8 °C) (Temporal)   Ht 167.6 cm (66\")   Wt 103 kg (226 lb)   SpO2 98%   BMI 36.48 kg/m²     Physical Exam  Vitals and nursing note reviewed.   Constitutional:       Appearance: Normal appearance. She is well-developed.   Eyes:      Extraocular Movements: Extraocular movements intact.      Conjunctiva/sclera: Conjunctivae normal.   Cardiovascular:      Rate and Rhythm: Normal rate and regular rhythm.      Heart sounds: Normal heart sounds. No murmur heard.  Pulmonary:      Effort: Pulmonary effort is normal. No respiratory distress.      Breath sounds: Normal breath sounds. No wheezing.   Musculoskeletal:         General: No tenderness.   Skin:     General: Skin is warm and dry.      Findings: No rash.      Comments: Sutures of right lower extremity are noted to have routine healing without erythema.  All sutures were removed today and patient tolerated procedure well.   Neurological:      Mental Status: She is alert and oriented to person, place, and time.   Psychiatric:         Mood and Affect: " Mood normal.         Behavior: Behavior normal.         Thought Content: Thought content normal.         The following data was reviewed by: CONY Berman on 09/16/2022:  CMP    CMP 9/29/21   Glucose 83   BUN 17   Creatinine 1.07 (A)   eGFR Non African Am 50 (A)   Sodium 137   Potassium 5.3 (A)   Chloride 103   Calcium 9.5   Albumin 4.40   Total Bilirubin 0.4   Alkaline Phosphatase 95   AST (SGOT) 20   ALT (SGPT) 14   (A) Abnormal value            CBC w/diff    CBC w/Diff 9/29/21   WBC 5.90   RBC 4.21   Hemoglobin 12.5   Hematocrit 39.0   MCV 92.6   MCH 29.7   MCHC 32.1   RDW 12.9   Platelets 409   Neutrophil Rel % 59.6   Immature Granulocyte Rel % 0.3   Lymphocyte Rel % 24.7   Monocyte Rel % 9.2   Eosinophil Rel % 4.7   Basophil Rel % 1.5           Data reviewed: Pathology results reviewed       Assessment & Plan     Diagnoses and all orders for this visit:    1. Squamous cell carcinoma, leg, right (Primary)  Comments:  Pathology results reviewed with patient today and margins are uninvolved  Sutures removed and wound is healing well.  Patient tolerated suture removal well.  Steri-Strips placed.  Patient advised of her increased risk of future skin cancers and advised to do full body skin checks routinely and report any skin changes.    2. Essential hypertension  Comments:  Continue losartan  Advised patient if BP elevates with use of phentermine then discontinue phentermine            Patient was given instructions and counseling regarding his condition or for health maintenance advice. Please see specific information pulled into the AVS if appropriate      This document has been electronically signed by:  Ijeoma Jones PA-C

## 2022-10-20 ENCOUNTER — OFFICE VISIT (OUTPATIENT)
Dept: FAMILY MEDICINE CLINIC | Facility: CLINIC | Age: 77
End: 2022-10-20

## 2022-10-20 VITALS
DIASTOLIC BLOOD PRESSURE: 100 MMHG | SYSTOLIC BLOOD PRESSURE: 144 MMHG | HEART RATE: 82 BPM | TEMPERATURE: 97.3 F | WEIGHT: 225 LBS | HEIGHT: 66 IN | BODY MASS INDEX: 36.16 KG/M2 | OXYGEN SATURATION: 98 %

## 2022-10-20 DIAGNOSIS — I10 ESSENTIAL HYPERTENSION: Chronic | ICD-10-CM

## 2022-10-20 DIAGNOSIS — E66.01 CLASS 2 SEVERE OBESITY DUE TO EXCESS CALORIES WITH SERIOUS COMORBIDITY AND BODY MASS INDEX (BMI) OF 36.0 TO 36.9 IN ADULT: Chronic | ICD-10-CM

## 2022-10-20 DIAGNOSIS — T81.30XA WOUND DEHISCENCE: Primary | ICD-10-CM

## 2022-10-20 DIAGNOSIS — E03.4 HYPOTHYROIDISM DUE TO ACQUIRED ATROPHY OF THYROID: Chronic | ICD-10-CM

## 2022-10-20 PROCEDURE — 99214 OFFICE O/P EST MOD 30 MIN: CPT | Performed by: PHYSICIAN ASSISTANT

## 2022-10-23 NOTE — PROGRESS NOTES
"Subjective   Tennille Gasca is a 77 y.o. female.       Chief Complaint -skin redness    History of Present Illness -    ROS    Skin redness-  She complains of redness on right lower extremity where squamous cell carcinoma was excised on 9/6/2022.  The sutures were removed on 9/16/2022 and Steri-Strips placed.  Patient states that she has not gotten the area wet and has not removed the Steri-Strips since that time.  The area is erythematous with the Steri-Strips and a bandage still in place.    Hypertension-likely elevated today due to anxiety and discomfort from right lower extremity issue    Hypothyroidism-stable with Synthroid 112 mcg daily    Obesity-  Patient does have weight loss of 1 pound in the past month with the use of Adipex..  She states that she is active regularly and is eating healthier food options.    The following portions of the patient's history were reviewed and updated as appropriate: allergies, current medications, past family history, past medical history, past social history, past surgical history and problem list.    Review of Systems    Objective  Vital signs:  /100   Pulse 82   Temp 97.3 °F (36.3 °C) (Temporal)   Ht 167.6 cm (66\")   Wt 102 kg (225 lb)   SpO2 98%   BMI 36.32 kg/m²     Physical Exam  Vitals and nursing note reviewed.   Constitutional:       Appearance: Normal appearance. She is well-developed.   Eyes:      Extraocular Movements: Extraocular movements intact.      Conjunctiva/sclera: Conjunctivae normal.   Cardiovascular:      Rate and Rhythm: Normal rate and regular rhythm.      Heart sounds: Normal heart sounds. No murmur heard.  Pulmonary:      Effort: Pulmonary effort is normal. No respiratory distress.      Breath sounds: Normal breath sounds. No wheezing.   Musculoskeletal:         General: No tenderness.   Skin:     General: Skin is warm and dry.      Findings: No rash.      Comments: Right anterior lower extremity noted to have the wound dehiscence from " previous squamous cell carcinoma excision.  The Steri-Strips and bandage were removed and localized erythema noted   Neurological:      Mental Status: She is alert and oriented to person, place, and time.   Psychiatric:         Mood and Affect: Mood normal.         Behavior: Behavior normal.         Thought Content: Thought content normal.         The following data was reviewed by: CONY Berman on 10/20/2022:      Lab Results   Component Value Date    GLUCOSE 83 09/29/2021    BUN 17 09/29/2021    CREATININE 1.07 (H) 09/29/2021    EGFRIFNONA 50 (L) 09/29/2021    BCR 15.9 09/29/2021    K 5.3 (H) 09/29/2021    CO2 23.6 09/29/2021    CALCIUM 9.5 09/29/2021    ALBUMIN 4.40 09/29/2021    LABIL2 1.5 03/17/2016    AST 20 09/29/2021    ALT 14 09/29/2021     Lab Results   Component Value Date    TSH 4.010 09/29/2021                    Assessment & Plan     Diagnoses and all orders for this visit:    1. Wound dehiscence (Primary)  Comments:  Discussed proper wound care and all bandage was removed  Start Bactroban and Dial soap washes  Just to ensure clarification we discussed patient should be getting the area wet and washing with Dial soap twice daily.  Patient verbalized understanding of home wound care.    Orders:  -     mupirocin (BACTROBAN) 2 % ointment; Apply 1 application topically to the appropriate area as directed 3 (Three) Times a Day.  Dispense: 30 g; Refill: 0    2. Essential hypertension  Comments:  Likely elevated secondary to acute leg issue  Advised to monitor BP and pulse and reevaluate according to data    3. Hypothyroidism due to acquired atrophy of thyroid  Comments:  Continue Synthroid 112 mcg daily    4. Class 2 severe obesity due to excess calories with serious comorbidity and body mass index (BMI) of 36.0 to 36.9 in adult (HCC)  Comments:  Advised to hold Adipex secondary to hypertension  Advise 30 minutes CV activity daily   Advised small portion sizes            Patient was given  instructions and counseling regarding his condition or for health maintenance advice. Please see specific information pulled into the AVS if appropriate      This document has been electronically signed by:  Ijeoma Jones PA-C

## 2022-11-10 ENCOUNTER — LAB (OUTPATIENT)
Dept: FAMILY MEDICINE CLINIC | Facility: CLINIC | Age: 77
End: 2022-11-10

## 2022-11-10 DIAGNOSIS — M85.80 OSTEOPENIA, UNSPECIFIED LOCATION: ICD-10-CM

## 2022-11-10 DIAGNOSIS — E03.4 HYPOTHYROIDISM DUE TO ACQUIRED ATROPHY OF THYROID: ICD-10-CM

## 2022-11-10 DIAGNOSIS — E55.9 VITAMIN D DEFICIENCY: ICD-10-CM

## 2022-11-10 DIAGNOSIS — R53.83 OTHER FATIGUE: ICD-10-CM

## 2022-11-10 DIAGNOSIS — I10 ESSENTIAL HYPERTENSION: ICD-10-CM

## 2022-11-10 LAB
25(OH)D3 SERPL-MCNC: 66 NG/ML (ref 30–100)
ALBUMIN SERPL-MCNC: 4.3 G/DL (ref 3.5–5.2)
ALBUMIN/GLOB SERPL: 1.4 G/DL
ALP SERPL-CCNC: 117 U/L (ref 39–117)
ALT SERPL W P-5'-P-CCNC: 13 U/L (ref 1–33)
ANION GAP SERPL CALCULATED.3IONS-SCNC: 12.2 MMOL/L (ref 5–15)
AST SERPL-CCNC: 19 U/L (ref 1–32)
BASOPHILS # BLD AUTO: 0.11 10*3/MM3 (ref 0–0.2)
BASOPHILS NFR BLD AUTO: 1.8 % (ref 0–1.5)
BILIRUB SERPL-MCNC: 0.3 MG/DL (ref 0–1.2)
BUN SERPL-MCNC: 15 MG/DL (ref 8–23)
BUN/CREAT SERPL: 15.2 (ref 7–25)
CALCIUM SPEC-SCNC: 9.3 MG/DL (ref 8.6–10.5)
CHLORIDE SERPL-SCNC: 98 MMOL/L (ref 98–107)
CHOLEST SERPL-MCNC: 189 MG/DL (ref 0–200)
CO2 SERPL-SCNC: 25.8 MMOL/L (ref 22–29)
CREAT SERPL-MCNC: 0.99 MG/DL (ref 0.57–1)
DEPRECATED RDW RBC AUTO: 40.8 FL (ref 37–54)
EGFRCR SERPLBLD CKD-EPI 2021: 59.6 ML/MIN/1.73
EOSINOPHIL # BLD AUTO: 0.3 10*3/MM3 (ref 0–0.4)
EOSINOPHIL NFR BLD AUTO: 4.8 % (ref 0.3–6.2)
ERYTHROCYTE [DISTWIDTH] IN BLOOD BY AUTOMATED COUNT: 12.3 % (ref 12.3–15.4)
GLOBULIN UR ELPH-MCNC: 3.1 GM/DL
GLUCOSE SERPL-MCNC: 94 MG/DL (ref 65–99)
HCT VFR BLD AUTO: 39.1 % (ref 34–46.6)
HDLC SERPL-MCNC: 59 MG/DL (ref 40–60)
HGB BLD-MCNC: 13.1 G/DL (ref 12–15.9)
IMM GRANULOCYTES # BLD AUTO: 0.01 10*3/MM3 (ref 0–0.05)
IMM GRANULOCYTES NFR BLD AUTO: 0.2 % (ref 0–0.5)
LDLC SERPL CALC-MCNC: 116 MG/DL (ref 0–100)
LDLC/HDLC SERPL: 1.95 {RATIO}
LYMPHOCYTES # BLD AUTO: 1.49 10*3/MM3 (ref 0.7–3.1)
LYMPHOCYTES NFR BLD AUTO: 24 % (ref 19.6–45.3)
MCH RBC QN AUTO: 30.3 PG (ref 26.6–33)
MCHC RBC AUTO-ENTMCNC: 33.5 G/DL (ref 31.5–35.7)
MCV RBC AUTO: 90.3 FL (ref 79–97)
MONOCYTES # BLD AUTO: 0.5 10*3/MM3 (ref 0.1–0.9)
MONOCYTES NFR BLD AUTO: 8.1 % (ref 5–12)
NEUTROPHILS NFR BLD AUTO: 3.79 10*3/MM3 (ref 1.7–7)
NEUTROPHILS NFR BLD AUTO: 61.1 % (ref 42.7–76)
NRBC BLD AUTO-RTO: 0 /100 WBC (ref 0–0.2)
PLATELET # BLD AUTO: 440 10*3/MM3 (ref 140–450)
PMV BLD AUTO: 10.8 FL (ref 6–12)
POTASSIUM SERPL-SCNC: 4.1 MMOL/L (ref 3.5–5.2)
PROT SERPL-MCNC: 7.4 G/DL (ref 6–8.5)
RBC # BLD AUTO: 4.33 10*6/MM3 (ref 3.77–5.28)
SODIUM SERPL-SCNC: 136 MMOL/L (ref 136–145)
TRIGL SERPL-MCNC: 75 MG/DL (ref 0–150)
TSH SERPL DL<=0.05 MIU/L-ACNC: 5.98 UIU/ML (ref 0.27–4.2)
VIT B12 BLD-MCNC: >2000 PG/ML (ref 211–946)
VLDLC SERPL-MCNC: 14 MG/DL (ref 5–40)
WBC NRBC COR # BLD: 6.2 10*3/MM3 (ref 3.4–10.8)

## 2022-11-10 PROCEDURE — 82306 VITAMIN D 25 HYDROXY: CPT | Performed by: GENERAL PRACTICE

## 2022-11-10 PROCEDURE — 80061 LIPID PANEL: CPT | Performed by: GENERAL PRACTICE

## 2022-11-10 PROCEDURE — 82607 VITAMIN B-12: CPT | Performed by: GENERAL PRACTICE

## 2022-11-10 PROCEDURE — 85025 COMPLETE CBC W/AUTO DIFF WBC: CPT | Performed by: GENERAL PRACTICE

## 2022-11-10 PROCEDURE — 80053 COMPREHEN METABOLIC PANEL: CPT | Performed by: GENERAL PRACTICE

## 2022-11-10 PROCEDURE — 84443 ASSAY THYROID STIM HORMONE: CPT | Performed by: GENERAL PRACTICE

## 2022-11-17 ENCOUNTER — OFFICE VISIT (OUTPATIENT)
Dept: FAMILY MEDICINE CLINIC | Facility: CLINIC | Age: 77
End: 2022-11-17

## 2022-11-17 VITALS
HEART RATE: 70 BPM | BODY MASS INDEX: 36 KG/M2 | SYSTOLIC BLOOD PRESSURE: 139 MMHG | HEIGHT: 66 IN | DIASTOLIC BLOOD PRESSURE: 75 MMHG | OXYGEN SATURATION: 100 % | RESPIRATION RATE: 14 BRPM | TEMPERATURE: 98.6 F | WEIGHT: 224 LBS

## 2022-11-17 DIAGNOSIS — E03.4 HYPOTHYROIDISM DUE TO ACQUIRED ATROPHY OF THYROID: ICD-10-CM

## 2022-11-17 DIAGNOSIS — I10 ESSENTIAL HYPERTENSION: Primary | ICD-10-CM

## 2022-11-17 DIAGNOSIS — N95.1 MENOPAUSAL SYMPTOM: ICD-10-CM

## 2022-11-17 DIAGNOSIS — G89.29 CHRONIC PAIN OF BOTH KNEES: ICD-10-CM

## 2022-11-17 DIAGNOSIS — E66.01 CLASS 2 SEVERE OBESITY WITH SERIOUS COMORBIDITY AND BODY MASS INDEX (BMI) OF 36.0 TO 36.9 IN ADULT, UNSPECIFIED OBESITY TYPE: ICD-10-CM

## 2022-11-17 DIAGNOSIS — K80.20 ASYMPTOMATIC CHOLELITHIASIS: ICD-10-CM

## 2022-11-17 DIAGNOSIS — E66.01 CLASS 2 SEVERE OBESITY WITH SERIOUS COMORBIDITY AND BODY MASS INDEX (BMI) OF 37.0 TO 37.9 IN ADULT, UNSPECIFIED OBESITY TYPE: ICD-10-CM

## 2022-11-17 DIAGNOSIS — E55.9 VITAMIN D DEFICIENCY: ICD-10-CM

## 2022-11-17 DIAGNOSIS — M25.561 CHRONIC PAIN OF BOTH KNEES: ICD-10-CM

## 2022-11-17 DIAGNOSIS — M25.562 CHRONIC PAIN OF BOTH KNEES: ICD-10-CM

## 2022-11-17 DIAGNOSIS — F33.41 RECURRENT MAJOR DEPRESSIVE DISORDER, IN PARTIAL REMISSION: ICD-10-CM

## 2022-11-17 DIAGNOSIS — Z00.00 HEALTHCARE MAINTENANCE: ICD-10-CM

## 2022-11-17 DIAGNOSIS — Z85.828 HISTORY OF NONMELANOMA SKIN CANCER: ICD-10-CM

## 2022-11-17 DIAGNOSIS — M85.80 OSTEOPENIA, UNSPECIFIED LOCATION: ICD-10-CM

## 2022-11-17 DIAGNOSIS — Z23 ENCOUNTER FOR IMMUNIZATION: ICD-10-CM

## 2022-11-17 PROCEDURE — G0008 ADMIN INFLUENZA VIRUS VAC: HCPCS | Performed by: GENERAL PRACTICE

## 2022-11-17 PROCEDURE — 90662 IIV NO PRSV INCREASED AG IM: CPT | Performed by: GENERAL PRACTICE

## 2022-11-17 PROCEDURE — 99214 OFFICE O/P EST MOD 30 MIN: CPT | Performed by: GENERAL PRACTICE

## 2022-11-17 RX ORDER — LOSARTAN POTASSIUM 50 MG/1
50 TABLET ORAL DAILY
Qty: 30 TABLET | Refills: 5 | Status: SHIPPED | OUTPATIENT
Start: 2022-11-17

## 2022-11-17 RX ORDER — PHENTERMINE HYDROCHLORIDE 37.5 MG/1
37.5 CAPSULE ORAL EVERY MORNING
Qty: 30 CAPSULE | Refills: 5 | Status: SHIPPED | OUTPATIENT
Start: 2022-11-17

## 2022-11-17 RX ORDER — LEVOTHYROXINE SODIUM 112 MCG
112 TABLET ORAL DAILY
Qty: 30 TABLET | Refills: 5 | Status: SHIPPED | OUTPATIENT
Start: 2022-11-17

## 2022-11-17 NOTE — PROGRESS NOTES
Subjective   Tennille Gasca is a 77 y.o. female.     Chief Complaint  She returns for a scheduled reassessment of multiple medical problems including essential hypertension, hypothyroidism, knee pain, chronic depression, and previous nonmelanoma skin cancer    History of Present Illness     Essential Hypertension  She remains on losartan 50 nightly with good control of her blood pressure at home and no apparent side effects. She continues to deny any chest pain, palpitations, lightheadedness, shortness of breath, or swelling of the ankles, and there is no history of any changes in her vision, strength, or sensation  Lab Results   Component Value Date    GLUCOSE 94 11/10/2022    BUN 15 11/10/2022    CREATININE 0.99 11/10/2022    EGFRIFNONA 50 (L) 09/29/2021    BCR 15.2 11/10/2022    K 4.1 11/10/2022    CO2 25.8 11/10/2022    CALCIUM 9.3 11/10/2022    ALBUMIN 4.30 11/10/2022    LABIL2 1.5 03/17/2016    AST 19 11/10/2022    ALT 13 11/10/2022     Lab Results   Component Value Date    ALKPHOS 117 11/10/2022     Hypothyroidism  She admits to fatigue, and struggles with her weight. Her hypothyroidism is due to Hashimoto's disease. She remains on levothyroxine 112 daily.   Lab Results   Component Value Date    TSH 5.980 (H) 11/10/2022     Knee Pain  She continues to have intermittent knee pain as previously described. There has been no change in the quality nor any new associated symptoms.  There is no history of any giving way or locking. The pain is aggravated by prolonged weightbearing and with ladders. Evaluation to date: none. Treatment to date: She is using topical diclofenac with a significant improvement and no apparent side effects    Depression  She has a long history of intermittent nervousness, and worrying.  There is no history of any depression at present and she continues to deny any anhedonia, difficulty concentrating, impaired memory,  or suicidal thoughts.  With melatonin she continues to average 7-8  hours of sleep nightly.    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  She denies any new skin lesions    Labs  Most recent vitamin D 66 with a B12 of >2000  Lab Results   Component Value Date    CHOL 189 11/10/2022    CHLPL 197 03/17/2016    TRIG 75 11/10/2022    HDL 59 11/10/2022     (H) 11/10/2022     Lab Results   Component Value Date    WBC 6.20 11/10/2022    HGB 13.1 11/10/2022    HCT 39.1 11/10/2022    MCV 90.3 11/10/2022     11/10/2022     The following portions of the patient's history were reviewed and updated as appropriate: allergies, current medications, past medical history, past social history and problem list.    Review of Systems   Constitutional: Positive for fatigue. Negative for appetite change, chills, fever and unexpected weight change.   HENT: Negative for congestion, ear pain, rhinorrhea, sneezing and sore throat.    Eyes: Negative for visual disturbance.   Respiratory: Negative for cough, shortness of breath and wheezing.    Cardiovascular: Negative for chest pain, palpitations and leg swelling.   Gastrointestinal: Negative for abdominal pain, blood in stool, constipation, diarrhea, nausea and vomiting.   Genitourinary: Negative for dysuria and hematuria.   Musculoskeletal: Positive for arthralgias. Negative for back pain, joint swelling and myalgias.   Skin: Negative for rash.   Neurological: Negative for weakness, numbness and headaches.   Psychiatric/Behavioral: Negative for dysphoric mood, sleep disturbance and suicidal ideas. The patient is nervous/anxious.      Objective   Physical Exam  Constitutional:       General: She is not in acute distress.     Appearance: Normal appearance. She is well-developed. She is not diaphoretic.      Comments: Bright and in good spirits.  Mild  antalgic gait.  No apparent distress at rest. No pallor, jaundice, diaphoresis, or cyanosis.     HENT:      Head: Atraumatic.      Right Ear: Tympanic membrane, ear canal and external ear normal.      Left Ear: Tympanic membrane, ear canal and external ear normal.   Eyes:      Conjunctiva/sclera: Conjunctivae normal.   Neck:      Thyroid: No thyroid mass or thyromegaly.      Vascular: No carotid bruit or JVD.      Trachea: Trachea normal. No tracheal deviation.   Cardiovascular:      Rate and Rhythm: Normal rate and regular rhythm.      Heart sounds: Normal heart sounds, S1 normal and S2 normal. No murmur heard.    No gallop.   Pulmonary:      Effort: Pulmonary effort is normal.      Breath sounds: Normal breath sounds.   Abdominal:      General: Bowel sounds are normal. There is no distension.   Musculoskeletal:      Right lower leg: No edema.      Left lower leg: No edema.   Lymphadenopathy:      Head:      Right side of head: No submental, submandibular, tonsillar, preauricular, posterior auricular or occipital adenopathy.      Left side of head: No submental, submandibular, tonsillar, preauricular, posterior auricular or occipital adenopathy.      Cervical: No cervical adenopathy.      Upper Body:      Right upper body: No supraclavicular adenopathy.      Left upper body: No supraclavicular adenopathy.   Skin:     General: Skin is warm.      Coloration: Skin is not cyanotic, jaundiced or pale.      Findings: No rash.      Nails: There is no clubbing.      Comments: Clean re-epithelialized wound right inner calf   Neurological:      Mental Status: She is alert and oriented to person, place, and time.      Cranial Nerves: No cranial nerve deficit.      Motor: No tremor.      Coordination: Coordination normal.      Gait: Gait normal.   Psychiatric:         Attention and Perception: Attention normal.         Mood and Affect: Mood normal.         Speech: Speech normal.         Behavior: Behavior normal.          Thought Content: Thought content normal.       Assessment & Plan   Problems Addressed this Visit        Cardiac and Vasculature    Essential hypertension    Hypertension: marginal. Evidence of target organ damage: none.  Encouraged to continue to work on diet and exercise plan.   Continue current medication  Reminded to continue to do home blood pressure checks and asked to bring monitor and log to next visit.    Relevant Medications    losartan (COZAAR) 50 MG tablet       Endocrine and Metabolic    Class 2 severe obesity with serious comorbidity and body mass index (BMI) of 36.0 to 36.9 in adult (HCC)  As above.   Continue current medication.    Relevant Medications    phentermine 37.5 MG capsule    Hypothyroidism due to acquired atrophy of thyroid  Clinically euthyroid.  Continue current medication.    Relevant Medications    Synthroid 112 MCG tablet    Vitamin D deficiency       Gastrointestinal Abdominal     Asymptomatic cholelithiasis       Genitourinary and Reproductive     Menopausal symptom    Relevant Medications    Estrogens Conjugated (PREMARIN) 0.625 MG/GM vaginal cream       Health Encounters    Healthcare maintenance  Flu shot administered.  Recommended an updated bivalent COVID-19 vaccination.  Reminded that she is due for Shingrix       Hematology and Neoplasia    History of nonmelanoma skin cancer  Encouraged report if any skin changes       Mental Health    Recurrent major depressive disorder, in partial remission ([facility])  Stable.  Supportive therapy.   Encouraged to report if any worse or if any new symptoms or concerns.               Musculoskeletal and Injuries    Bilateral knee pain  Reminded regarding symptomatic treatment.   Continue current medication  Encouraged to report if any worse or if any new symptoms or concerns.    Relevant Medications    Diclofenac Sodium (VOLTAREN) 1 % gel gel    Osteopenia            Diagnoses       Codes Comments    Essential hypertension    -  Primary ICD-10-CM:  I10  ICD-9-CM: 401.9     Vitamin D deficiency     ICD-10-CM: E55.9  ICD-9-CM: 268.9     Hypothyroidism due to acquired atrophy of thyroid     ICD-10-CM: E03.4  ICD-9-CM: 244.8, 246.8     Class 2 severe obesity with serious comorbidity and body mass index (BMI) of 36.0 to 36.9 in adult, unspecified obesity type (HCC)     ICD-10-CM: E66.01, Z68.36  ICD-9-CM: 278.01, V85.36     Asymptomatic cholelithiasis     ICD-10-CM: K80.20  ICD-9-CM: 574.20     Menopausal symptom     ICD-10-CM: N95.1  ICD-9-CM: 627.2     Healthcare maintenance     ICD-10-CM: Z00.00  ICD-9-CM: V70.0     Recurrent major depressive disorder, in partial remission (HCC)     ICD-10-CM: F33.41  ICD-9-CM: 296.35     Osteopenia, unspecified location     ICD-10-CM: M85.80  ICD-9-CM: 733.90     Encounter for immunization     ICD-10-CM: Z23  ICD-9-CM: V03.89     History of nonmelanoma skin cancer     ICD-10-CM: Z85.828  ICD-9-CM: V10.83     Chronic pain of both knees     ICD-10-CM: M25.561, M25.562, G89.29  ICD-9-CM: 719.46, 338.29     Class 2 severe obesity with serious comorbidity and body mass index (BMI) of 37.0 to 37.9 in adult, unspecified obesity type (HCC)     ICD-10-CM: E66.01, Z68.37  ICD-9-CM: 278.01, V85.37

## 2023-02-16 ENCOUNTER — OFFICE VISIT (OUTPATIENT)
Dept: FAMILY MEDICINE CLINIC | Facility: CLINIC | Age: 78
End: 2023-02-16
Payer: MEDICARE

## 2023-02-16 ENCOUNTER — HOSPITAL ENCOUNTER (OUTPATIENT)
Dept: RESPIRATORY THERAPY | Facility: HOSPITAL | Age: 78
Discharge: HOME | End: 2023-02-16
Payer: MEDICARE

## 2023-02-16 DIAGNOSIS — R06.09 DYSPNEA ON EXERTION: Primary | ICD-10-CM

## 2023-02-16 DIAGNOSIS — I10 ESSENTIAL HYPERTENSION: Chronic | ICD-10-CM

## 2023-02-16 DIAGNOSIS — R00.2 PALPITATIONS: ICD-10-CM

## 2023-02-16 LAB
QT INTERVAL: 352 MS
QTC INTERVAL: 430 MS

## 2023-02-16 PROCEDURE — 93010 ELECTROCARDIOGRAM REPORT: CPT | Performed by: INTERNAL MEDICINE

## 2023-02-16 PROCEDURE — 93005 ELECTROCARDIOGRAM TRACING: CPT | Performed by: NURSE PRACTITIONER

## 2023-02-16 PROCEDURE — 99214 OFFICE O/P EST MOD 30 MIN: CPT | Performed by: NURSE PRACTITIONER

## 2023-02-16 PROCEDURE — 93225 XTRNL ECG REC<48 HRS REC: CPT

## 2023-02-16 NOTE — PROGRESS NOTES
History of Present Illness  Tennille Gasca is a 77 y.o. female who presents to the office today presents to the office today complaining of shortness of breath on exertion which started after Eleanor.  She thought initially it would just go away but it has continued.  She has been diagnosed with hypertension and hypothyroidism.    Shortness of Breath  This is a new problem. The current episode started more than 1 month ago. The problem occurs daily. The problem has been gradually worsening. Pertinent negatives include no chest pain, fever, headaches, hemoptysis, leg swelling, orthopnea, rash, vomiting or wheezing. She has tried rest for the symptoms. There is no history of allergies, aspirin allergies, asthma, CAD, chronic lung disease, COPD, DVT, a heart failure, PE or pneumonia. She does take 162 mg of aspirin daily.  Tennille reports her dyspnea is not related to nausea, diaphoresis or chest pain pressure.  She reports no shortness of breath when she is resting or laying down.  She has no history of smoking or secondhand smoke.    Hypertension  Losartan 50 mg and  mg daily.    Hypothyroidism  Synthroid 112 mcg daily    The following portions of the patient's history were reviewed and updated as appropriate: allergies, current medications, past family history, past medical history, past social history, past surgical history and problem list.    Review of Systems   Constitutional: Negative for activity change, appetite change, chills, diaphoresis, fatigue, fever and unexpected weight change.   HENT: Negative.  Negative for congestion, postnasal drip and rhinorrhea.    Eyes: Negative for visual disturbance.   Respiratory: Positive for shortness of breath (On exertion). Negative for cough, hemoptysis, chest tightness and wheezing.    Cardiovascular: Positive for palpitations. Negative for chest pain, orthopnea and leg swelling.   Gastrointestinal: Negative for nausea and vomiting.   Endocrine: Negative for  "cold intolerance, heat intolerance, polydipsia, polyphagia and polyuria.   Musculoskeletal: Positive for arthralgias (Bilateral knee pain).   Skin: Negative for color change and rash.   Neurological: Positive for dizziness and light-headedness. Negative for tremors, speech difficulty, weakness and headaches.   Hematological: Negative for adenopathy.   Psychiatric/Behavioral: Negative for confusion and decreased concentration. The patient is not nervous/anxious.    All other systems reviewed and are negative.    Vital signs:  /90 (BP Location: Left arm, Patient Position: Sitting, Cuff Size: Adult)   Pulse 96   Temp 97.1 °F (36.2 °C) (Temporal)   Resp 14   Ht 167.6 cm (65.98\")   Wt 101 kg (222 lb)   SpO2 99%   BMI 35.85 kg/m²     Physical Exam  Vitals and nursing note reviewed.   Constitutional:       General: She is not in acute distress.     Appearance: She is well-developed.      Comments: Slow and cautious gait   HENT:      Head: Normocephalic.      Nose: Nose normal.   Eyes:      General: No scleral icterus.        Right eye: No discharge.         Left eye: No discharge.      Conjunctiva/sclera: Conjunctivae normal.   Neck:      Thyroid: No thyromegaly.      Vascular: No JVD.   Cardiovascular:      Rate and Rhythm: Normal rate and regular rhythm.      Heart sounds: Normal heart sounds. No murmur heard.    No friction rub.   Pulmonary:      Effort: Pulmonary effort is normal. No respiratory distress.      Breath sounds: Normal breath sounds. No wheezing or rales.   Abdominal:      General: Bowel sounds are normal. There is no distension.      Palpations: Abdomen is soft.      Tenderness: There is no abdominal tenderness.   Musculoskeletal:         General: Tenderness present.      Cervical back: Neck supple.      Right lower leg: No edema.      Left lower leg: No edema.   Lymphadenopathy:      Cervical: No cervical adenopathy.   Skin:     General: Skin is warm and dry.      Capillary Refill: Capillary " refill takes less than 2 seconds.      Findings: No erythema or rash.   Neurological:      Mental Status: She is alert and oriented to person, place, and time.   Psychiatric:         Behavior: Behavior normal.         Thought Content: Thought content normal.         Judgment: Judgment normal.       Result Review : Reviewed EKG which was completed at [facility] on 2/16/2023 and read by Dr. Tariq:  Normal sinus rhythm Normal ECG    Class 2 Severe Obesity (BMI >=35 and <=39.9). Obesity-related health conditions include the following: hypertension and osteoarthritis. Obesity is improving with treatment. BMI  is above average; BMI management plan is completed.  Provided information on portion control and increasing exercise.     Assessment & Plan     Diagnoses and all orders for this visit:    1. Dyspnea on exertion (Primary)  Comments:  Treatment options reviewed.  Stress test and echo ordered  Orders:  -     Adult Transthoracic Echo Complete W/ Cont if Necessary Per Protocol; Future  -     Stress Test With Myocardial Perfusion - One Day; Future    2. Palpitations  Comments:  48 Hr Holter  Orders:  -     Holter Monitor - 48 Hour  -     ECG 12 Lead  -     Adult Transthoracic Echo Complete W/ Cont if Necessary Per Protocol; Future    3. Essential hypertension  Comments:  Continue Losartan  Orders:  -     Adult Transthoracic Echo Complete W/ Cont if Necessary Per Protocol; Future    Follow Up Will follow after tests completed  Findings and recommendations discussed with Tennille. Reviewed treatment options.  Return reviewed and if occur to seek evaluation or if symptoms worsen.  Lifestyle modifications reinforced including nutrition and activity recommendations.  Tennille will follow up with me after her tests are completed sooner if problems/concerns occur.  Tennille was given instructions and counseling regarding her condition or for health maintenance advice. Please see specific information pulled into the AVS if  appropriate    This document has been electronically signed by:

## 2023-02-18 VITALS
RESPIRATION RATE: 14 BRPM | OXYGEN SATURATION: 99 % | DIASTOLIC BLOOD PRESSURE: 90 MMHG | SYSTOLIC BLOOD PRESSURE: 140 MMHG | HEART RATE: 96 BPM | WEIGHT: 222 LBS | HEIGHT: 66 IN | BODY MASS INDEX: 35.68 KG/M2 | TEMPERATURE: 97.1 F

## 2023-02-18 PROBLEM — R00.2 PALPITATIONS: Status: ACTIVE | Noted: 2023-02-18

## 2023-02-18 RX ORDER — ASPIRIN 81 MG/1
162 TABLET ORAL DAILY
COMMUNITY

## 2023-03-01 PROCEDURE — 93227 XTRNL ECG REC<48 HR R&I: CPT | Performed by: INTERNAL MEDICINE

## 2023-03-16 ENCOUNTER — HOSPITAL ENCOUNTER (OUTPATIENT)
Dept: NUCLEAR MEDICINE | Facility: HOSPITAL | Age: 78
Discharge: HOME | End: 2023-03-16
Payer: MEDICARE

## 2023-03-16 ENCOUNTER — HOSPITAL ENCOUNTER (OUTPATIENT)
Dept: CARDIOLOGY | Facility: HOSPITAL | Age: 78
Discharge: HOME | End: 2023-03-16
Payer: MEDICARE

## 2023-03-16 DIAGNOSIS — I10 ESSENTIAL HYPERTENSION: Chronic | ICD-10-CM

## 2023-03-16 DIAGNOSIS — R00.2 PALPITATIONS: ICD-10-CM

## 2023-03-16 DIAGNOSIS — R06.09 DYSPNEA ON EXERTION: ICD-10-CM

## 2023-03-16 LAB
BH CV NUCLEAR PRIOR STUDY: 3
BH CV REST NUCLEAR ISOTOPE DOSE: 10.3 MCI
BH CV STRESS BP STAGE 1: NORMAL
BH CV STRESS COMMENTS STAGE 1: NORMAL
BH CV STRESS DOSE REGADENOSON STAGE 1: 0.4
BH CV STRESS DURATION MIN STAGE 1: 0
BH CV STRESS DURATION SEC STAGE 1: 10
BH CV STRESS HR STAGE 1: 85
BH CV STRESS NUCLEAR ISOTOPE DOSE: 30.5 MCI
BH CV STRESS PROTOCOL 1: NORMAL
BH CV STRESS RECOVERY BP: NORMAL MMHG
BH CV STRESS RECOVERY HR: 76 BPM
BH CV STRESS STAGE 1: 1
MAXIMAL PREDICTED HEART RATE: 144 BPM
PERCENT MAX PREDICTED HR: 59.03 %
STRESS BASELINE BP: NORMAL MMHG
STRESS BASELINE HR: 71 BPM
STRESS PERCENT HR: 69 %
STRESS POST PEAK BP: NORMAL MMHG
STRESS POST PEAK HR: 85 BPM
STRESS TARGET HR: 122 BPM

## 2023-03-16 PROCEDURE — 93017 CV STRESS TEST TRACING ONLY: CPT

## 2023-03-16 PROCEDURE — 25010000002 REGADENOSON 0.4 MG/5ML SOLUTION: Performed by: NURSE PRACTITIONER

## 2023-03-16 PROCEDURE — 93018 CV STRESS TEST I&R ONLY: CPT | Performed by: INTERNAL MEDICINE

## 2023-03-16 PROCEDURE — 78452 HT MUSCLE IMAGE SPECT MULT: CPT

## 2023-03-16 PROCEDURE — 93306 TTE W/DOPPLER COMPLETE: CPT

## 2023-03-16 PROCEDURE — A9500 TC99M SESTAMIBI: HCPCS | Performed by: NURSE PRACTITIONER

## 2023-03-16 PROCEDURE — 93306 TTE W/DOPPLER COMPLETE: CPT | Performed by: INTERNAL MEDICINE

## 2023-03-16 PROCEDURE — 0 TECHNETIUM SESTAMIBI: Performed by: NURSE PRACTITIONER

## 2023-03-16 PROCEDURE — 78452 HT MUSCLE IMAGE SPECT MULT: CPT | Performed by: INTERNAL MEDICINE

## 2023-03-16 RX ADMIN — REGADENOSON 0.4 MG: 0.08 INJECTION, SOLUTION INTRAVENOUS at 11:49

## 2023-03-16 RX ADMIN — TECHNETIUM TC 99M SESTAMIBI 1 DOSE: 1 INJECTION INTRAVENOUS at 10:05

## 2023-03-16 RX ADMIN — TECHNETIUM TC 99M SESTAMIBI 1 DOSE: 1 INJECTION INTRAVENOUS at 11:49

## 2023-03-19 LAB
BH CV ECHO MEAS - ACS: 1.8 CM
BH CV ECHO MEAS - AO MAX PG: 11.8 MMHG
BH CV ECHO MEAS - AO MEAN PG: 6 MMHG
BH CV ECHO MEAS - AO ROOT DIAM: 3.1 CM
BH CV ECHO MEAS - AO V2 MAX: 172 CM/SEC
BH CV ECHO MEAS - AO V2 VTI: 34.3 CM
BH CV ECHO MEAS - AVA(I,D): 1.9 CM2
BH CV ECHO MEAS - EDV(CUBED): 107.9 ML
BH CV ECHO MEAS - EDV(MOD-SP4): 65.5 ML
BH CV ECHO MEAS - EF(MOD-SP4): 70.7 %
BH CV ECHO MEAS - ESV(CUBED): 28.8 ML
BH CV ECHO MEAS - ESV(MOD-SP4): 19.2 ML
BH CV ECHO MEAS - FS: 35.6 %
BH CV ECHO MEAS - IVS/LVPW: 1.08 CM
BH CV ECHO MEAS - IVSD: 1.23 CM
BH CV ECHO MEAS - LA DIMENSION: 3.5 CM
BH CV ECHO MEAS - LAT PEAK E' VEL: 9.9 CM/SEC
BH CV ECHO MEAS - LV DIASTOLIC VOL/BSA (35-75): 31.3 CM2
BH CV ECHO MEAS - LV MASS(C)D: 212.4 GRAMS
BH CV ECHO MEAS - LV MAX PG: 8.4 MMHG
BH CV ECHO MEAS - LV MEAN PG: 4 MMHG
BH CV ECHO MEAS - LV SYSTOLIC VOL/BSA (12-30): 9.2 CM2
BH CV ECHO MEAS - LV V1 MAX: 145 CM/SEC
BH CV ECHO MEAS - LV V1 VTI: 28.7 CM
BH CV ECHO MEAS - LVIDD: 4.8 CM
BH CV ECHO MEAS - LVIDS: 3.1 CM
BH CV ECHO MEAS - LVOT AREA: 2.27 CM2
BH CV ECHO MEAS - LVOT DIAM: 1.7 CM
BH CV ECHO MEAS - LVPWD: 1.14 CM
BH CV ECHO MEAS - MED PEAK E' VEL: 7.2 CM/SEC
BH CV ECHO MEAS - MV A MAX VEL: 95.1 CM/SEC
BH CV ECHO MEAS - MV E MAX VEL: 74.1 CM/SEC
BH CV ECHO MEAS - MV E/A: 0.78
BH CV ECHO MEAS - PA ACC TIME: 0.1 SEC
BH CV ECHO MEAS - PA PR(ACCEL): 33.1 MMHG
BH CV ECHO MEAS - RAP SYSTOLE: 10 MMHG
BH CV ECHO MEAS - RVSP: 46.7 MMHG
BH CV ECHO MEAS - SI(MOD-SP4): 22.1 ML/M2
BH CV ECHO MEAS - SV(LVOT): 65.1 ML
BH CV ECHO MEAS - SV(MOD-SP4): 46.3 ML
BH CV ECHO MEAS - TAPSE (>1.6): 2.23 CM
BH CV ECHO MEAS - TR MAX PG: 36.7 MMHG
BH CV ECHO MEAS - TR MAX VEL: 303 CM/SEC
BH CV ECHO MEASUREMENTS AVERAGE E/E' RATIO: 8.67
LEFT ATRIUM VOLUME INDEX: 22.1 ML/M2
MAXIMAL PREDICTED HEART RATE: 144 BPM
STRESS TARGET HR: 122 BPM

## 2023-04-18 ENCOUNTER — OFFICE VISIT (OUTPATIENT)
Dept: FAMILY MEDICINE CLINIC | Facility: CLINIC | Age: 78
End: 2023-04-18
Payer: MEDICARE

## 2023-04-18 DIAGNOSIS — E03.4 HYPOTHYROIDISM DUE TO ACQUIRED ATROPHY OF THYROID: ICD-10-CM

## 2023-04-18 DIAGNOSIS — F33.41 RECURRENT MAJOR DEPRESSIVE DISORDER, IN PARTIAL REMISSION: ICD-10-CM

## 2023-04-18 DIAGNOSIS — Z12.31 ENCOUNTER FOR SCREENING MAMMOGRAM FOR BREAST CANCER: ICD-10-CM

## 2023-04-18 DIAGNOSIS — M85.80 OSTEOPENIA, UNSPECIFIED LOCATION: ICD-10-CM

## 2023-04-18 DIAGNOSIS — Z85.828 HISTORY OF NONMELANOMA SKIN CANCER: ICD-10-CM

## 2023-04-18 DIAGNOSIS — E66.01 CLASS 2 SEVERE OBESITY WITH SERIOUS COMORBIDITY AND BODY MASS INDEX (BMI) OF 36.0 TO 36.9 IN ADULT, UNSPECIFIED OBESITY TYPE: ICD-10-CM

## 2023-04-18 DIAGNOSIS — M25.561 CHRONIC PAIN OF BOTH KNEES: ICD-10-CM

## 2023-04-18 DIAGNOSIS — I47.1 PAROXYSMAL SVT (SUPRAVENTRICULAR TACHYCARDIA): ICD-10-CM

## 2023-04-18 DIAGNOSIS — R53.83 OTHER FATIGUE: ICD-10-CM

## 2023-04-18 DIAGNOSIS — E66.01 CLASS 2 SEVERE OBESITY WITH SERIOUS COMORBIDITY AND BODY MASS INDEX (BMI) OF 37.0 TO 37.9 IN ADULT, UNSPECIFIED OBESITY TYPE: ICD-10-CM

## 2023-04-18 DIAGNOSIS — N95.1 MENOPAUSAL SYMPTOM: ICD-10-CM

## 2023-04-18 DIAGNOSIS — I10 ESSENTIAL HYPERTENSION: Primary | ICD-10-CM

## 2023-04-18 DIAGNOSIS — Z00.00 HEALTHCARE MAINTENANCE: ICD-10-CM

## 2023-04-18 DIAGNOSIS — M25.562 CHRONIC PAIN OF BOTH KNEES: ICD-10-CM

## 2023-04-18 DIAGNOSIS — E55.9 VITAMIN D DEFICIENCY: ICD-10-CM

## 2023-04-18 DIAGNOSIS — G89.29 CHRONIC PAIN OF BOTH KNEES: ICD-10-CM

## 2023-04-18 NOTE — PROGRESS NOTES
Subjective   Tennille Gasca is a 78 y.o. female.     Chief Complaint  She returns for a scheduled reassessment of multiple medical problems including essential hypertension, hypothyroidism, chronic knee pain, and depression    History of Present Illness     Essential Hypertension  With the warmer weather, she has been more active and has noted some improvement in her shortness of breath.  She denies any orthopnea or PND and there is no history of any chest pain, palpitations lightheadedness, or some of the ankles.  She remains on losartan 50 nightly with good control of her blood pressure at home and no apparent side effects.  48-hour Holter monitoring completed on 3/1/2023 revealed 2 short 8-14 beat runs of SVT (possible atrial flutter).  Echocardiogram performed on 3/19/2023 was reported as showing grade 1 diastolic dysfunction, sclerosis of the aortic valve with mild stenosis, mild MR and TR, but normal systolic function with an estimated EF of 61 to 65%.  Stress testing performed the same day revealed no evidence of inducible ischemia    Hypothyroidism  She admits to fatigue, and struggles with her weight. Her hypothyroidism is due to Hashimoto's disease. She remains on levothyroxine 112 daily.     Knee Pain  She continues to have intermittent knee pain as previously described. There has been no change in the quality nor any new associated symptoms.  There is no history of any giving way or locking. The pain is aggravated by prolonged weightbearing and with ladders. Evaluation to date: none. Treatment to date: She is using topical diclofenac with a significant improvement and no apparent side effects    Depression  She has a long history of intermittent nervousness, and worrying.  There is no history of any depression at present and she continues to deny any anhedonia, difficulty concentrating, impaired memory,  or suicidal thoughts.  With melatonin she continues to average 7-8 hours of sleep  nightly.    Previous Nonmelanoma Skin Cancer  She denies any new skin lesions    The following portions of the patient's history were reviewed and updated as appropriate: allergies, current medications, past medical history, past social history and problem list.    Review of Systems   Constitutional: Positive for fatigue. Negative for appetite change, chills, fever and unexpected weight change.   HENT: Negative for congestion, ear pain, rhinorrhea, sneezing and sore throat.    Eyes: Negative for visual disturbance.   Respiratory: Positive for shortness of breath (with above average exertion). Negative for cough and wheezing.    Cardiovascular: Negative for chest pain, palpitations and leg swelling.   Gastrointestinal: Negative for abdominal pain, blood in stool, constipation, diarrhea, nausea and vomiting.   Genitourinary: Negative for dysuria and hematuria.   Musculoskeletal: Positive for arthralgias. Negative for back pain, joint swelling and myalgias.   Skin: Negative for rash.   Neurological: Negative for weakness, numbness and headaches.   Psychiatric/Behavioral: Negative for dysphoric mood, sleep disturbance and suicidal ideas. The patient is nervous/anxious.      Objective   Physical Exam  Constitutional:       General: She is not in acute distress.     Appearance: Normal appearance. She is well-developed. She is not diaphoretic.      Comments: Bright and in good spirits. No apparent distress. No pallor, jaundice, diaphoresis, or cyanosis.   HENT:      Head: Atraumatic.      Right Ear: Tympanic membrane, ear canal and external ear normal.      Left Ear: Tympanic membrane, ear canal and external ear normal.      Mouth/Throat:      Lips: No lesions.      Mouth: Mucous membranes are moist. No oral lesions.      Pharynx: No oropharyngeal exudate or posterior oropharyngeal erythema.   Eyes:      General: Lids are normal.      Extraocular Movements: Extraocular movements intact.      Conjunctiva/sclera:  Conjunctivae normal.      Pupils: Pupils are equal.   Neck:      Thyroid: No thyroid mass or thyromegaly.      Vascular: No carotid bruit or JVD.      Trachea: Trachea normal. No tracheal deviation.   Cardiovascular:      Rate and Rhythm: Normal rate and regular rhythm.      Heart sounds: Normal heart sounds, S1 normal and S2 normal. No murmur heard.    No gallop.   Pulmonary:      Effort: Pulmonary effort is normal.      Breath sounds: Normal breath sounds.   Abdominal:      General: Bowel sounds are normal. There is no distension.   Musculoskeletal:      Right lower leg: No edema.      Left lower leg: No edema.   Lymphadenopathy:      Head:      Right side of head: No submental, submandibular, tonsillar, preauricular, posterior auricular or occipital adenopathy.      Left side of head: No submental, submandibular, tonsillar, preauricular, posterior auricular or occipital adenopathy.      Cervical: No cervical adenopathy.      Upper Body:      Right upper body: No supraclavicular adenopathy.      Left upper body: No supraclavicular adenopathy.   Skin:     General: Skin is warm.      Coloration: Skin is not cyanotic, jaundiced or pale.      Findings: No rash.      Nails: There is no clubbing.   Neurological:      Mental Status: She is alert and oriented to person, place, and time.      Cranial Nerves: No cranial nerve deficit, dysarthria or facial asymmetry.      Sensory: No sensory deficit.      Motor: No tremor.      Coordination: Coordination normal.      Gait: Gait normal.   Psychiatric:         Attention and Perception: Attention normal.         Mood and Affect: Mood normal.         Speech: Speech normal.         Behavior: Behavior normal.         Thought Content: Thought content normal.       Assessment & Plan   Problems Addressed this Visit        Cardiac and Vasculature    Essential hypertension   Hypertension: at goal. Evidence of target organ damage: grade 1 diastolic dysfunction on recent  echocardiogram.  Encouraged to continue to work on diet and exercise plan.   Continue current medication    Relevant Medications    losartan (COZAAR) 50 MG tablet    Paroxysmal SVT (supraventricular tachycardia)  Several short episodes of SVT (possible atrial flutter) on recent Holter monitoring.  Reviewed findings and agreed on a longer period of monitoring if she should experience any palpitations or any lightheadedness, increased shortness of breath etc.  Encouraged to avoid caffeine and decongestants       Endocrine and Metabolic    Class 2 severe obesity with serious comorbidity and body mass index (BMI) of 36.0 to 36.9 in adult  Encouraged to continue to work on her diet and exercise plan.  Advised that phentermine might increase her risk of SVT and other arrhythmias.  Patient like to continue for now but will consider    Relevant Medications    phentermine 37.5 MG capsule    Hypothyroidism due to acquired atrophy of thyroid  Clinically and bio-chemically euthyroid.  Continue current medication.    Relevant Medications    Synthroid 112 MCG tablet    Vitamin D deficiency       Genitourinary and Reproductive     Encounter for screening mammogram for breast cancer    Relevant Orders    Mammo Screening Digital Tomosynthesis Bilateral With CAD    Menopausal symptom    Relevant Medications    Estrogens Conjugated (PREMARIN) 0.625 MG/GM vaginal cream       Health Encounters    Healthcare maintenance  Reminded that she is due for Shingrix  We will arrange an updated mammogram    Relevant Orders    Mammo Screening Digital Tomosynthesis Bilateral With CAD       Hematology and Neoplasia    History of nonmelanoma skin cancer       Mental Health    Recurrent major depressive disorder, in partial remission    Significant situational component.   Supportive therapy.   Encouraged to report if any worse or if any new symptoms or concerns.           Musculoskeletal and Injuries    Bilateral knee pain  Reminded regarding  symptomatic treatment.   Continue current medication    Relevant Medications    Diclofenac Sodium (VOLTAREN) 1 % gel gel    Osteopenia  Encouraged to continue to pursue weight bearing activities while exercising joint protection.  We will plan on updating a DEXA scan in 2024            Diagnoses       Codes Comments    Essential hypertension    -  Primary ICD-10-CM: I10  ICD-9-CM: 401.9     Hypothyroidism due to acquired atrophy of thyroid     ICD-10-CM: E03.4  ICD-9-CM: 244.8, 246.8     Vitamin D deficiency     ICD-10-CM: E55.9  ICD-9-CM: 268.9     Class 2 severe obesity with serious comorbidity and body mass index (BMI) of 36.0 to 36.9 in adult, unspecified obesity type     ICD-10-CM: E66.01, Z68.36  ICD-9-CM: 278.01, V85.36     Menopausal symptom     ICD-10-CM: N95.1  ICD-9-CM: 627.2     Healthcare maintenance     ICD-10-CM: Z00.00  ICD-9-CM: V70.0     History of nonmelanoma skin cancer     ICD-10-CM: Z85.828  ICD-9-CM: V10.83     Recurrent major depressive disorder, in partial remission     ICD-10-CM: F33.41  ICD-9-CM: 296.35     Osteopenia, unspecified location     ICD-10-CM: M85.80  ICD-9-CM: 733.90     Encounter for screening mammogram for breast cancer     ICD-10-CM: Z12.31  ICD-9-CM: V76.12     Paroxysmal SVT (supraventricular tachycardia)     ICD-10-CM: I47.1  ICD-9-CM: 427.0     Chronic pain of both knees     ICD-10-CM: M25.561, M25.562, G89.29  ICD-9-CM: 719.46, 338.29     Other fatigue     ICD-10-CM: R53.83  ICD-9-CM: 780.79     Class 2 severe obesity with serious comorbidity and body mass index (BMI) of 37.0 to 37.9 in adult, unspecified obesity type     ICD-10-CM: E66.01, Z68.37  ICD-9-CM: 278.01, V85.37

## 2023-04-19 VITALS
WEIGHT: 221 LBS | BODY MASS INDEX: 35.52 KG/M2 | DIASTOLIC BLOOD PRESSURE: 80 MMHG | RESPIRATION RATE: 14 BRPM | OXYGEN SATURATION: 100 % | SYSTOLIC BLOOD PRESSURE: 128 MMHG | HEIGHT: 66 IN | HEART RATE: 95 BPM | TEMPERATURE: 98.2 F

## 2023-04-19 PROBLEM — I47.1 PAROXYSMAL SVT (SUPRAVENTRICULAR TACHYCARDIA): Status: ACTIVE | Noted: 2023-02-18

## 2023-04-19 PROBLEM — R53.83 OTHER FATIGUE: Status: RESOLVED | Noted: 2019-05-06 | Resolved: 2023-04-19

## 2023-04-19 PROBLEM — I47.10 PAROXYSMAL SVT (SUPRAVENTRICULAR TACHYCARDIA): Status: ACTIVE | Noted: 2023-02-18

## 2023-04-19 RX ORDER — PHENTERMINE HYDROCHLORIDE 37.5 MG/1
37.5 CAPSULE ORAL EVERY MORNING
Qty: 30 CAPSULE | Refills: 5 | Status: SHIPPED | OUTPATIENT
Start: 2023-04-19

## 2023-04-19 RX ORDER — LEVOTHYROXINE SODIUM 112 MCG
112 TABLET ORAL DAILY
Qty: 30 TABLET | Refills: 5 | Status: SHIPPED | OUTPATIENT
Start: 2023-04-19

## 2023-04-19 RX ORDER — LOSARTAN POTASSIUM 50 MG/1
50 TABLET ORAL DAILY
Qty: 30 TABLET | Refills: 5 | Status: SHIPPED | OUTPATIENT
Start: 2023-04-19

## 2023-06-05 ENCOUNTER — OFFICE VISIT (OUTPATIENT)
Dept: FAMILY MEDICINE CLINIC | Facility: CLINIC | Age: 78
End: 2023-06-05
Payer: MEDICARE

## 2023-06-05 VITALS
HEIGHT: 66 IN | OXYGEN SATURATION: 93 % | BODY MASS INDEX: 35.77 KG/M2 | WEIGHT: 222.6 LBS | SYSTOLIC BLOOD PRESSURE: 180 MMHG | TEMPERATURE: 97.7 F | HEART RATE: 98 BPM | DIASTOLIC BLOOD PRESSURE: 110 MMHG

## 2023-06-05 DIAGNOSIS — N30.01 ACUTE CYSTITIS WITH HEMATURIA: Primary | ICD-10-CM

## 2023-06-05 DIAGNOSIS — I10 PRIMARY HYPERTENSION: ICD-10-CM

## 2023-06-05 DIAGNOSIS — R03.0 ELEVATED BLOOD PRESSURE READING: ICD-10-CM

## 2023-06-05 LAB
BILIRUB BLD-MCNC: NEGATIVE MG/DL
CLARITY, POC: CLEAR
COLOR UR: YELLOW
EXPIRATION DATE: ABNORMAL
GLUCOSE UR STRIP-MCNC: NEGATIVE MG/DL
KETONES UR QL: NEGATIVE
LEUKOCYTE EST, POC: ABNORMAL
Lab: ABNORMAL
NITRITE UR-MCNC: NEGATIVE MG/ML
PH UR: 6 [PH] (ref 5–8)
PROT UR STRIP-MCNC: NEGATIVE MG/DL
RBC # UR STRIP: ABNORMAL /UL
SP GR UR: 1 (ref 1–1.03)
UROBILINOGEN UR QL: NORMAL

## 2023-06-05 PROCEDURE — 1159F MED LIST DOCD IN RCRD: CPT | Performed by: PHYSICIAN ASSISTANT

## 2023-06-05 PROCEDURE — 99214 OFFICE O/P EST MOD 30 MIN: CPT | Performed by: PHYSICIAN ASSISTANT

## 2023-06-05 PROCEDURE — 3077F SYST BP >= 140 MM HG: CPT | Performed by: PHYSICIAN ASSISTANT

## 2023-06-05 PROCEDURE — 3080F DIAST BP >= 90 MM HG: CPT | Performed by: PHYSICIAN ASSISTANT

## 2023-06-05 PROCEDURE — 1160F RVW MEDS BY RX/DR IN RCRD: CPT | Performed by: PHYSICIAN ASSISTANT

## 2023-06-05 PROCEDURE — 81003 URINALYSIS AUTO W/O SCOPE: CPT | Performed by: PHYSICIAN ASSISTANT

## 2023-06-05 RX ORDER — FLUCONAZOLE 150 MG/1
150 TABLET ORAL
Qty: 2 TABLET | Refills: 0 | Status: SHIPPED | OUTPATIENT
Start: 2023-06-05

## 2023-06-05 RX ORDER — CEFDINIR 300 MG/1
300 CAPSULE ORAL 2 TIMES DAILY
Qty: 14 CAPSULE | Refills: 0 | Status: SHIPPED | OUTPATIENT
Start: 2023-06-05 | End: 2023-06-12

## 2023-06-05 NOTE — PATIENT INSTRUCTIONS
Hypertension, Adult  Hypertension is another name for high blood pressure. High blood pressure forces your heart to work harder to pump blood. This can cause problems over time.  There are two numbers in a blood pressure reading. There is a top number (systolic) over a bottom number (diastolic). It is best to have a blood pressure that is below 120/80.  What are the causes?  The cause of this condition is not known. Some other conditions can lead to high blood pressure.  What increases the risk?  Some lifestyle factors can make you more likely to develop high blood pressure:  Smoking.  Not getting enough exercise or physical activity.  Being overweight.  Having too much fat, sugar, calories, or salt (sodium) in your diet.  Drinking too much alcohol.  Other risk factors include:  Having any of these conditions:  Heart disease.  Diabetes.  High cholesterol.  Kidney disease.  Obstructive sleep apnea.  Having a family history of high blood pressure and high cholesterol.  Age. The risk increases with age.  Stress.  What are the signs or symptoms?  High blood pressure may not cause symptoms. Very high blood pressure (hypertensive crisis) may cause:  Headache.  Fast or uneven heartbeats (palpitations).  Shortness of breath.  Nosebleed.  Vomiting or feeling like you may vomit (nauseous).  Changes in how you see.  Very bad chest pain.  Feeling dizzy.  Seizures.  How is this treated?  This condition is treated by making healthy lifestyle changes, such as:  Eating healthy foods.  Exercising more.  Drinking less alcohol.  Your doctor may prescribe medicine if lifestyle changes do not help enough and if:  Your top number is above 130.  Your bottom number is above 80.  Your personal target blood pressure may vary.  Follow these instructions at home:  Eating and drinking    If told, follow the DASH eating plan. To follow this plan:  Fill one half of your plate at each meal with fruits and vegetables.  Fill one fourth of your plate  at each meal with whole grains. Whole grains include whole-wheat pasta, brown rice, and whole-grain bread.  Eat or drink low-fat dairy products, such as skim milk or low-fat yogurt.  Fill one fourth of your plate at each meal with low-fat (lean) proteins. Low-fat proteins include fish, chicken without skin, eggs, beans, and tofu.  Avoid fatty meat, cured and processed meat, or chicken with skin.  Avoid pre-made or processed food.  Limit the amount of salt in your diet to less than 1,500 mg each day.  Do not drink alcohol if:  Your doctor tells you not to drink.  You are pregnant, may be pregnant, or are planning to become pregnant.  If you drink alcohol:  Limit how much you have to:  0-1 drink a day for women.  0-2 drinks a day for men.  Know how much alcohol is in your drink. In the [place] one drink equals one 12 oz bottle of beer (355 mL), one 5 oz glass of wine (148 mL), or one 1½ oz glass of hard liquor (44 mL).  Lifestyle    Work with your doctor to stay at a healthy weight or to lose weight. Ask your doctor what the best weight is for you.  Get at least 30 minutes of exercise that causes your heart to beat faster (aerobic exercise) most days of the week. This may include walking, swimming, or biking.  Get at least 30 minutes of exercise that strengthens your muscles (resistance exercise) at least 3 days a week. This may include lifting weights or doing Pilates.  Do not smoke or use any products that contain nicotine or tobacco. If you need help quitting, ask your doctor.  Check your blood pressure at home as told by your doctor.  Keep all follow-up visits.  Medicines  Take over-the-counter and prescription medicines only as told by your doctor. Follow directions carefully.  Do not skip doses of blood pressure medicine. The medicine does not work as well if you skip doses. Skipping doses also puts you at risk for problems.  Ask your doctor about side effects or reactions to medicines that you should watch  for.  Contact a doctor if:  You think you are having a reaction to the medicine you are taking.  You have headaches that keep coming back.  You feel dizzy.  You have swelling in your ankles.  You have trouble with your vision.  Get help right away if:  You get a very bad headache.  You start to feel mixed up (confused).  You feel weak or numb.  You feel faint.  You have very bad pain in your:  Chest.  Belly (abdomen).  You vomit more than once.  You have trouble breathing.  These symptoms may be an emergency. Get help right away. Call 911.  Do not wait to see if the symptoms will go away.  Do not drive yourself to the hospital.  Summary  Hypertension is another name for high blood pressure.  High blood pressure forces your heart to work harder to pump blood.  For most people, a normal blood pressure is less than 120/80.  Making healthy choices can help lower blood pressure. If your blood pressure does not get lower with healthy choices, you may need to take medicine.  This information is not intended to replace advice given to you by your health care provider. Make sure you discuss any questions you have with your health care provider.  Document Revised: 10/06/2022 Document Reviewed: 10/06/2022  [employer] Patient Education © 2022 [employer] Low-Sodium Eating Plan  Sodium, which is an element that makes up salt, helps you maintain a healthy balance of fluids in your body. Too much sodium can increase your blood pressure and cause fluid and waste to be held in your body.  Your health care provider or dietitian may recommend following this plan if you have high blood pressure (hypertension), kidney disease, liver disease, or heart failure. Eating less sodium can help lower your blood pressure, reduce swelling, and protect your heart, liver, and kidneys.  What are tips for following this plan?  Reading food labels  The Nutrition Facts label lists the amount of sodium in one serving of the food. If you eat more than  "one serving, you must multiply the listed amount of sodium by the number of servings.  Choose foods with less than 140 mg of sodium per serving.  Avoid foods with 300 mg of sodium or more per serving.  Shopping    Look for lower-sodium products, often labeled as \"low-sodium\" or \"no salt added.\"  Always check the sodium content, even if foods are labeled as \"unsalted\" or \"no salt added.\"  Buy fresh foods.  Avoid canned foods and pre-made or frozen meals.  Avoid canned, cured, or processed meats.  Buy breads that have less than 80 mg of sodium per slice.  Cooking    Eat more home-cooked food and less restaurant, buffet, and fast food.  Avoid adding salt when cooking. Use salt-free seasonings or herbs instead of table salt or sea salt. Check with your health care provider or pharmacist before using salt substitutes.  Cook with plant-based oils, such as canola, sunflower, or olive oil.  Meal planning  When eating at a restaurant, ask that your food be prepared with less salt or no salt, if possible. Avoid dishes labeled as brined, pickled, cured, smoked, or made with soy sauce, miso, or teriyaki sauce.  Avoid foods that contain MSG (monosodium glutamate). MSG is sometimes added to Chinese food, bouillon, and some canned foods.  Make meals that can be grilled, baked, poached, roasted, or steamed. These are generally made with less sodium.  General information  Most people on this plan should limit their sodium intake to 1,500-2,000 mg (milligrams) of sodium each day.  What foods should I eat?  Fruits  Fresh, frozen, or canned fruit. Fruit juice.  Vegetables  Fresh or frozen vegetables. \"No salt added\" canned vegetables. \"No salt added\" tomato sauce and paste. Low-sodium or reduced-sodium tomato and vegetable juice.  Grains  Low-sodium cereals, including oats, puffed wheat and rice, and shredded wheat. Low-sodium crackers. Unsalted rice. Unsalted pasta. Low-sodium bread. Whole-grain breads and whole-grain pasta.  Meats " and other proteins  Fresh or frozen (no salt added) meat, poultry, seafood, and fish. Low-sodium canned tuna and salmon. Unsalted nuts. Dried peas, beans, and lentils without added salt. Unsalted canned beans. Eggs. Unsalted nut butters.  Dairy  Milk. Soy milk. Cheese that is naturally low in sodium, such as ricotta cheese, fresh mozzarella, or Swiss cheese. Low-sodium or reduced-sodium cheese. Cream cheese. Yogurt.  Seasonings and condiments  Fresh and dried herbs and spices. Salt-free seasonings. Low-sodium mustard and ketchup. Sodium-free salad dressing. Sodium-free light mayonnaise. Fresh or refrigerated horseradish. Lemon juice. Vinegar.  Other foods  Homemade, reduced-sodium, or low-sodium soups. Unsalted popcorn and pretzels. Low-salt or salt-free chips.  The items listed above may not be a complete list of foods and beverages you can eat. Contact a dietitian for more information.  What foods should I avoid?  Vegetables  Sauerkraut, pickled vegetables, and relishes. Olives. French fries. Onion rings. Regular canned vegetables (not low-sodium or reduced-sodium). Regular canned tomato sauce and paste (not low-sodium or reduced-sodium). Regular tomato and vegetable juice (not low-sodium or reduced-sodium). Frozen vegetables in sauces.  Grains  Instant hot cereals. Bread stuffing, pancake, and biscuit mixes. Croutons. Seasoned rice or pasta mixes. Noodle soup cups. Boxed or frozen macaroni and cheese. Regular salted crackers. Self-rising flour.  Meats and other proteins  Meat or fish that is salted, canned, smoked, spiced, or pickled. Precooked or cured meat, such as sausages or meat loaves. Sylvester. Ham. Pepperoni. Hot dogs. Corned beef. Chipped beef. Salt pork. Jerky. Pickled herring. Anchovies and sardines. Regular canned tuna. Salted nuts.  Dairy  Processed cheese and cheese spreads. Hard cheeses. Cheese curds. Blue cheese. Feta cheese. String cheese. Regular cottage cheese. Buttermilk. Canned milk.  Fats and  oils  Salted butter. Regular margarine. Ghee. Sylvester fat.  Seasonings and condiments  Onion salt, garlic salt, seasoned salt, table salt, and sea salt. Canned and packaged gravies. Worcestershire sauce. Tartar sauce. Barbecue sauce. Teriyaki sauce. Soy sauce, including reduced-sodium. Steak sauce. Fish sauce. Oyster sauce. Cocktail sauce. Horseradish that you find on the shelf. Regular ketchup and mustard. Meat flavorings and tenderizers. Bouillon cubes. Hot sauce. Pre-made or packaged marinades. Pre-made or packaged taco seasonings. Relishes. Regular salad dressings. Salsa.  Other foods  Salted popcorn and pretzels. Corn chips and puffs. Potato and tortilla chips. Canned or dried soups. Pizza. Frozen entrees and pot pies.  The items listed above may not be a complete list of foods and beverages you should avoid. Contact a dietitian for more information.  Summary  Eating less sodium can help lower your blood pressure, reduce swelling, and protect your heart, liver, and kidneys.  Most people on this plan should limit their sodium intake to 1,500-2,000 mg (milligrams) of sodium each day.  Canned, boxed, and frozen foods are high in sodium. Restaurant foods, fast foods, and pizza are also very high in sodium. You also get sodium by adding salt to food.  Try to cook at home, eat more fresh fruits and vegetables, and eat less fast food and canned, processed, or prepared foods.  This information is not intended to replace advice given to you by your health care provider. Make sure you discuss any questions you have with your health care provider.  Document Revised: 01/22/2021 Document Reviewed: 11/18/2020  [employer] Patient Education © 2022 [employer]

## 2023-06-05 NOTE — PROGRESS NOTES
Subjective        Chief Complaint  Urinary Tract Infection, Back Pain, and Abdominal Pain    Subjective      History of Present Illness  Tennille Gasca is a 78 y.o. female who presents today to [facility] for Urinary Tract Infection, Back Pain, and Abdominal Pain. She has a past medical history of depression, Essential hypertension, Hypothyroidism, and Osteopenia.    Urinary Tract Infection, Back Pain, and Abdominal Pain:  Tennille presents the office today with complaints of possible UTI. She reports 1 day of dysuria.  She has been hurting slightly in her left flank as well.  She has had chills and subjective fever.  She has not appreciated any hematuria or cloudy appearance of the urine.  She has been taking some AZO pills.  She denies any recent UTI or antibiotic therapy.    Hypertension:  Elevated blood pressure reading:  Initial blood pressure in the office today is 170/110.  Repeat is 180/110.  She is currently managed on losartan 50 mg daily.  She reports BP at home has been running around 147 systolic, however, she has not been monitoring it routinely.  She denies any symptoms of headache, visual disturbances, chest pains, or shortness of breath.  She does report eating a lot of salt.  Is also on phentermine which she reports she has been on for some time and has had weight loss with this.      Current Outpatient Medications:     aspirin 81 MG EC tablet, Take 2 tablets by mouth Daily., Disp: , Rfl:     cholecalciferol (VITAMIN D3) 25 MCG (1000 UT) tablet, Take 1 tablet by mouth Daily., Disp: 30 tablet, Rfl: 5    coenzyme Q10 100 MG capsule, Take 1 capsule by mouth Daily., Disp: , Rfl:     Diclofenac Sodium (VOLTAREN) 1 % gel gel, Apply 4 g topically to the appropriate area as directed 4 (Four) Times a Day As Needed (joint pain)., Disp: 500 g, Rfl: 5    Estrogens Conjugated (PREMARIN) 0.625 MG/GM vaginal cream, Insert  into the vagina 3 (Three) Times a Week., Disp: 30 g,  "Rfl: 5    losartan (COZAAR) 50 MG tablet, Take 1 tablet by mouth Daily., Disp: 30 tablet, Rfl: 5    phentermine 37.5 MG capsule, Take 1 capsule by mouth Every Morning., Disp: 30 capsule, Rfl: 5    Synthroid 112 MCG tablet, Take 1 tablet by mouth Daily., Disp: 30 tablet, Rfl: 5    vitamin B-12 (CYANOCOBALAMIN) 500 MCG tablet, Take 1 tablet by mouth Daily., Disp: 30 tablet, Rfl: 5    cefdinir (OMNICEF) 300 MG capsule, Take 1 capsule by mouth 2 (Two) Times a Day for 7 days., Disp: 14 capsule, Rfl: 0    fluconazole (DIFLUCAN) 150 MG tablet, Take 1 tablet by mouth Every 72 (Seventy-Two) Hours As Needed (Yeast) for up to 2 doses., Disp: 2 tablet, Rfl: 0      Allergies   Allergen Reactions    Macrobid [Nitrofurantoin] Swelling    Sulfa Antibiotics Rash     Objective     Objective   Vital Signs:  Blood Pressure (Abnormal) 180/110   Pulse 98   Temperature 97.7 °F (36.5 °C) (Temporal)   Height 167.6 cm (65.98\")   Weight 101 kg (222 lb 9.6 oz)   Oxygen Saturation 93%   Body Mass Index 35.95 kg/m²   Estimated body mass index is 35.95 kg/m² as calculated from the following:    Height as of this encounter: 167.6 cm (65.98\").    Weight as of this encounter: 101 kg (222 lb 9.6 oz).        Past Medical History:   Diagnosis Date    Depression     Essential hypertension     Hypothyroidism due to acquired atrophy of thyroid     Osteopenia      Past Surgical History:   Procedure Laterality Date    COLONOSCOPY      about 10-12 years ago    COLONOSCOPY N/A 6/20/2019    Procedure: COLONOSCOPY;  Surgeon: Kee Robert MD;  Location: [facility];  Service: Gastroenterology    EYE SURGERY      TUBAL ABDOMINAL LIGATION       Social History     Socioeconomic History    Marital status:      Spouse name: shukri    Number of children: 1    Years of education: 12   Tobacco Use    Smoking status: Never     Passive exposure: Never    Smokeless tobacco: Never   Substance and Sexual Activity    Alcohol use: No    Drug use: No    " Sexual activity: Defer      Physical Exam  Vitals and nursing note reviewed.   Constitutional:       General: She is not in acute distress.     Appearance: She is well-developed. She is not diaphoretic.   HENT:      Head: Normocephalic and atraumatic.   Eyes:      General: No scleral icterus.        Right eye: No discharge.         Left eye: No discharge.      Conjunctiva/sclera: Conjunctivae normal.   Cardiovascular:      Rate and Rhythm: Normal rate and regular rhythm.      Heart sounds: Normal heart sounds. No murmur heard.    No friction rub. No gallop.   Pulmonary:      Effort: Pulmonary effort is normal. No respiratory distress.      Breath sounds: Normal breath sounds. No wheezing or rales.   Chest:      Chest wall: No tenderness.   Abdominal:      Tenderness: There is no right CVA tenderness or left CVA tenderness.   Musculoskeletal:         General: Normal range of motion.      Cervical back: Normal range of motion and neck supple.   Skin:     General: Skin is warm and dry.      Coloration: Skin is not pale.      Findings: No erythema or rash.   Neurological:      Mental Status: She is alert and oriented to person, place, and time.   Psychiatric:         Behavior: Behavior normal.      Result Review :  The following data was reviewed by: CONY Ortega on 06/05/2023:  TSH   Date Value Ref Range Status   11/10/2022 5.980 (H) 0.270 - 4.200 uIU/mL Final     HDL Cholesterol   Date Value Ref Range Status   11/10/2022 59 40 - 60 mg/dL Final     LDL Cholesterol    Date Value Ref Range Status   11/10/2022 116 (H) 0 - 100 mg/dL Final     Triglycerides   Date Value Ref Range Status   11/10/2022 75 0 - 150 mg/dL Final     Total Cholesterol   Date Value Ref Range Status   03/17/2016 197 0 - 200 mg/dL Final     Comment:       Cholesterol Reference Range:      Desirable                 < 200mg/dl      Borderline High        200-239mg/dl      High Risk                 > 239mg/dl           Assessment / Plan          Assessment   Diagnoses and all orders for this visit:    1. Acute cystitis with hematuria (Primary)  In the office shows 3+ leukocytes and trace of blood. Specimen sent for urine culture.  She has allergies to nitrofurantoin and sulfa antibiotics.  Treat with cefdinir 300 mg twice daily x7 days.  She requested a Rx for Diflucan as she frequently gets yeast infections with taking antibiotics. Fluconazole 150 mg 1 tablet every 72 hours as needed for yeast for up to 2 doses sent to pharmacy.  Return to clinic if no improvement noted or if symptoms are worsening.   -     POCT urinalysis dipstick, automated  -     Urine Culture - Urine, Urine, Clean Catch; Future    2. Elevated blood pressure reading  3. Primary hypertension  BP is significantly elevated at up to 180/110 in the office today.  Discussed risks of uncontrolled high blood pressure affecting small vessels in the brain, eyes, heart, kidneys etc.  Discussed recommendation of adjusting her antihypertensive regimen as well as weaning off of phentermine due to known side effect of hypertension.  She feels her symptoms are related to stress and her UTI.  She declines adjustment of her hypertensive regimen and weaning off of the phentermine for now despite being aware of the risks.   Encouraged close monitoring of her blood pressure at home and to call if it continues to run greater than 140/90 or if she develops any headache, visual changes, chest pains, shortness of breath, etc.  Plan for follow-up in 2 weeks or sooner if needed to for close monitoring of blood pressure.   Low-sodium diet education as well as hypertensive education shared in the AVS.     Other orders  -     cefdinir (OMNICEF) 300 MG capsule; Take 1 capsule by mouth 2 (Two) Times a Day for 7 days.  Dispense: 14 capsule; Refill: 0  -     fluconazole (DIFLUCAN) 150 MG tablet; Take 1 tablet by mouth Every 72 (Seventy-Two) Hours As Needed (Yeast) for up to 2 doses.  Dispense: 2 tablet; Refill: 0      New Medications Ordered This Visit   Medications    cefdinir (OMNICEF) 300 MG capsule     Sig: Take 1 capsule by mouth 2 (Two) Times a Day for 7 days.     Dispense:  14 capsule     Refill:  0    fluconazole (DIFLUCAN) 150 MG tablet     Sig: Take 1 tablet by mouth Every 72 (Seventy-Two) Hours As Needed (Yeast) for up to 2 doses.     Dispense:  2 tablet     Refill:  0     I spent 40 minutes caring for Tennille on this date of service. This time includes time spent by me in the following activities:preparing for the visit, reviewing tests, obtaining and/or reviewing a separately obtained history, performing a medically appropriate examination and/or evaluation , ordering medications, tests, or procedures, and documenting information in the medical record    Follow Up   Return in about 2 weeks (around 6/19/2023) for HTN.    Patient was given instructions and counseling regarding her condition or for health maintenance advice. Please see specific information pulled into the AVS if appropriate.       This document has been electronically signed by CONY Ortega   June 5, 2023 09:38 EDT    Dictated Utilizing Dragon Dictation: Part of this note may be an electronic transcription/translation of spoken language to printed text using the Dragon Dictation System.

## 2023-07-10 ENCOUNTER — TELEPHONE (OUTPATIENT)
Dept: FAMILY MEDICINE CLINIC | Facility: CLINIC | Age: 78
End: 2023-07-10

## 2023-07-10 NOTE — TELEPHONE ENCOUNTER
Caller: Tennille Gasca    Relationship: Self    Best call back number: [phone]     What medication are you requesting: ANTIBIOTICS FOR BLADDER INFECTION    What are your current symptoms: BURNING AND PAINFUL URINATION    How long have you been experiencing symptoms: 07.10.23    Have you had these symptoms before:    [x] Yes  [] No    Have you been treated for these symptoms before:   [x] Yes  [] No    If a prescription is needed, what is your preferred pharmacy and phone number: [address] [place] [address] - [phone]  - [phone]      Additional notes: PLEASE CALL PATIENT BACK AND LET HER KNOW WHEN MEDICATION HAS BEEN SENT OR IF SHE NEEDS TO COME IN TO BE SEEN.

## 2023-07-21 ENCOUNTER — TELEPHONE (OUTPATIENT)
Dept: FAMILY MEDICINE CLINIC | Facility: CLINIC | Age: 78
End: 2023-07-21

## 2023-07-21 PROCEDURE — 87086 URINE CULTURE/COLONY COUNT: CPT | Performed by: GENERAL PRACTICE

## 2023-07-21 PROCEDURE — 87186 SC STD MICRODIL/AGAR DIL: CPT | Performed by: GENERAL PRACTICE

## 2023-07-21 PROCEDURE — 87088 URINE BACTERIA CULTURE: CPT | Performed by: GENERAL PRACTICE

## 2023-07-21 NOTE — TELEPHONE ENCOUNTER
She's going to come by and drop off a urine sample. She said she is in a lot of pain right now and wanted to know if you could send her in something based off of her urine dip. Ill send it to you when she comes in.

## 2023-07-21 NOTE — TELEPHONE ENCOUNTER
Caller: Tennille Gasca    Relationship: Self    Best call back number: [phone]     Requested Prescriptions:   Requested Prescriptions      No prescriptions requested or ordered in this encounter        Pharmacy where request should be sent: [address] [place] [address] - [phone] PH - [phone] FX     Last office visit with prescribing clinician: 4/18/2023   Last telemedicine visit with prescribing clinician: Visit date not found   Next office visit with prescribing clinician: 9/18/2023     Additional details provided by patient: PATIENT IS ASKING FOR CEFDINIR 300MG BECAUSE SHE WAS RECENTLY TREATED FOR A BLADDER INFECTION AND PATIENT STATES SHE IS STARTING TO HAVE SYMPTOMS AGAIN, WHICH IS VAGINAL BURNING, AND URGENCY OF URINATION PLEASE ADVISE PATIENT IF THIS CAN BE CALLED IN WITHOUT BEING SEEN    Does the patient have less than a 3 day supply:  [x] Yes  [] No        Quan Vincent Rep   07/21/23 09:26 EDT

## 2023-08-30 ENCOUNTER — HOSPITAL ENCOUNTER (OUTPATIENT)
Dept: MAMMOGRAPHY | Facility: HOSPITAL | Age: 78
Discharge: HOME | End: 2023-08-30
Admitting: GENERAL PRACTICE
Payer: MEDICARE

## 2023-08-30 DIAGNOSIS — Z00.00 HEALTHCARE MAINTENANCE: ICD-10-CM

## 2023-08-30 DIAGNOSIS — Z12.31 ENCOUNTER FOR SCREENING MAMMOGRAM FOR BREAST CANCER: ICD-10-CM

## 2023-08-30 PROCEDURE — 77067 SCR MAMMO BI INCL CAD: CPT

## 2023-08-30 PROCEDURE — 77063 BREAST TOMOSYNTHESIS BI: CPT

## 2023-09-15 DIAGNOSIS — E66.01 CLASS 2 SEVERE OBESITY WITH SERIOUS COMORBIDITY AND BODY MASS INDEX (BMI) OF 37.0 TO 37.9 IN ADULT, UNSPECIFIED OBESITY TYPE: ICD-10-CM

## 2023-09-15 RX ORDER — PHENTERMINE HYDROCHLORIDE 37.5 MG/1
37.5 CAPSULE ORAL EVERY MORNING
Qty: 30 CAPSULE | Refills: 5 | Status: SHIPPED | OUTPATIENT
Start: 2023-09-15 | End: 2023-09-18 | Stop reason: SDUPTHER

## 2023-09-15 NOTE — TELEPHONE ENCOUNTER
Caller: Elo Tennille S    Relationship: Self    Best call back number: [phone]     Requested Prescriptions:   Requested Prescriptions     Pending Prescriptions Disp Refills    phentermine 37.5 MG capsule 30 capsule 5     Sig: Take 1 capsule by mouth Every Morning.        Pharmacy where request should be sent: [address] [place] [address] - [phone]  - [phone] FX     Last office visit with prescribing clinician: 4/18/2023   Last telemedicine visit with prescribing clinician: Visit date not found   Next office visit with prescribing clinician: 9/18/2023     Additional details provided by patient: THE PHARMACY IS SHORT 25 CAPSULES.  THEY DO HAVE THE TABLETS, THE PHARMACY NEEDS TO YOU TO APPROVE THE TABLETS    Does the patient have less than a 3 day supply:  [x] Yes  [] No    Would you like a call back once the refill request has been completed: [x] Yes [] No    If the office needs to give you a call back, can they leave a voicemail: [] Yes [] No    Quan Borja   09/15/23 09:17 EDT

## 2023-09-18 ENCOUNTER — OFFICE VISIT (OUTPATIENT)
Dept: FAMILY MEDICINE CLINIC | Facility: CLINIC | Age: 78
End: 2023-09-18
Payer: MEDICARE

## 2023-09-18 VITALS
HEIGHT: 66 IN | BODY MASS INDEX: 35.52 KG/M2 | OXYGEN SATURATION: 97 % | WEIGHT: 221 LBS | DIASTOLIC BLOOD PRESSURE: 68 MMHG | HEART RATE: 96 BPM | RESPIRATION RATE: 14 BRPM | TEMPERATURE: 97.4 F | SYSTOLIC BLOOD PRESSURE: 126 MMHG

## 2023-09-18 DIAGNOSIS — N30.01 ACUTE CYSTITIS WITH HEMATURIA: ICD-10-CM

## 2023-09-18 DIAGNOSIS — I47.1 PAROXYSMAL SVT (SUPRAVENTRICULAR TACHYCARDIA): ICD-10-CM

## 2023-09-18 DIAGNOSIS — M25.561 CHRONIC PAIN OF BOTH KNEES: ICD-10-CM

## 2023-09-18 DIAGNOSIS — N95.1 MENOPAUSAL SYMPTOM: ICD-10-CM

## 2023-09-18 DIAGNOSIS — Z85.828 HISTORY OF NONMELANOMA SKIN CANCER: ICD-10-CM

## 2023-09-18 DIAGNOSIS — E66.01 CLASS 2 SEVERE OBESITY WITH SERIOUS COMORBIDITY AND BODY MASS INDEX (BMI) OF 35.0 TO 35.9 IN ADULT, UNSPECIFIED OBESITY TYPE: ICD-10-CM

## 2023-09-18 DIAGNOSIS — M25.562 CHRONIC PAIN OF BOTH KNEES: ICD-10-CM

## 2023-09-18 DIAGNOSIS — N39.0 RECURRENT URINARY TRACT INFECTION: ICD-10-CM

## 2023-09-18 DIAGNOSIS — G89.29 CHRONIC PAIN OF BOTH KNEES: ICD-10-CM

## 2023-09-18 DIAGNOSIS — Z23 ENCOUNTER FOR IMMUNIZATION: ICD-10-CM

## 2023-09-18 DIAGNOSIS — E03.4 HYPOTHYROIDISM DUE TO ACQUIRED ATROPHY OF THYROID: ICD-10-CM

## 2023-09-18 DIAGNOSIS — Z79.899 HIGH RISK MEDICATION USE: ICD-10-CM

## 2023-09-18 DIAGNOSIS — I10 ESSENTIAL HYPERTENSION: Primary | ICD-10-CM

## 2023-09-18 DIAGNOSIS — E66.01 CLASS 2 SEVERE OBESITY WITH SERIOUS COMORBIDITY AND BODY MASS INDEX (BMI) OF 37.0 TO 37.9 IN ADULT, UNSPECIFIED OBESITY TYPE: ICD-10-CM

## 2023-09-18 DIAGNOSIS — E55.9 VITAMIN D DEFICIENCY: ICD-10-CM

## 2023-09-18 DIAGNOSIS — Z00.00 HEALTHCARE MAINTENANCE: ICD-10-CM

## 2023-09-18 DIAGNOSIS — K80.20 ASYMPTOMATIC CHOLELITHIASIS: ICD-10-CM

## 2023-09-18 DIAGNOSIS — M85.80 OSTEOPENIA, UNSPECIFIED LOCATION: ICD-10-CM

## 2023-09-18 PROCEDURE — 84443 ASSAY THYROID STIM HORMONE: CPT | Performed by: GENERAL PRACTICE

## 2023-09-18 PROCEDURE — 82306 VITAMIN D 25 HYDROXY: CPT | Performed by: GENERAL PRACTICE

## 2023-09-18 PROCEDURE — 80061 LIPID PANEL: CPT | Performed by: GENERAL PRACTICE

## 2023-09-18 PROCEDURE — 85025 COMPLETE CBC W/AUTO DIFF WBC: CPT | Performed by: GENERAL PRACTICE

## 2023-09-18 PROCEDURE — 80053 COMPREHEN METABOLIC PANEL: CPT | Performed by: GENERAL PRACTICE

## 2023-09-18 RX ORDER — PHENTERMINE HYDROCHLORIDE 37.5 MG/1
37.5 CAPSULE ORAL EVERY MORNING
Qty: 30 CAPSULE | Refills: 5 | Status: SHIPPED | OUTPATIENT
Start: 2023-09-18

## 2023-09-18 RX ORDER — LOSARTAN POTASSIUM 50 MG/1
50 TABLET ORAL DAILY
Qty: 30 TABLET | Refills: 5 | Status: SHIPPED | OUTPATIENT
Start: 2023-09-18

## 2023-09-18 RX ORDER — LEVOTHYROXINE SODIUM 112 MCG
112 TABLET ORAL DAILY
Qty: 30 TABLET | Refills: 5 | Status: SHIPPED | OUTPATIENT
Start: 2023-09-18

## 2023-09-18 NOTE — PROGRESS NOTES
The ABCs of the Annual Wellness Visit  Subsequent Medicare Wellness Visit    Chief Complaint  Subsequent Medicare Wellness Visit    Subjective    History of Present Illness:  Tennille Gasca is a 78 y.o. female who presents for a Subsequent Medicare Wellness Visit.    Recent Urinary Tract Infections  She was seen at [facility] ER 9 days ago with dysuria, suprapubic pressure, and generalized weakness.  While records have yet to be received, she was apparently diagnosed with a another urinary tract infection.  She was prescribed cefdinir with a prompt resolution of her symptoms.  Urine culture done here on 7/21/2023 grew E. coli.  She admits to vaginal dryness and recently resumed topical estrogen.  She denies any frequency, nocturia, urgency, hematuria, vaginal discharge or bleeding and there is no history of any fever, chills, or night sweats.    Essential Hypertension  She has remained fairly active with a continued improvement in her shortness of breath.  There is no history of any orthopnea or PND and she continues to deny any chest pain, palpitations lightheadedness, or swelling of the ankles.  She remains on losartan 50 nightly with no apparent side effects.  48-hour Holter monitoring completed on 3/1/2023 revealed 2 short 8-14 beat runs of SVT (possible atrial flutter).  Echocardiogram performed on 3/19/2023 was reported as showing grade 1 diastolic dysfunction, sclerosis of the aortic valve with mild stenosis, mild MR and TR, but normal systolic function with an estimated EF of 61 to 65%.  Stress testing performed the same day revealed no evidence of inducible ischemia    Hypothyroidism  She admits to fatigue, and struggles with her weight. Her hypothyroidism is due to Hashimoto's disease. She remains on levothyroxine 112 daily.     Knee Pain  She continues to have intermittent knee pain as previously described. There has been no change in the quality nor any new associated symptoms.  There is no  history of any giving way or locking. The pain is aggravated by prolonged weightbearing and with ladders. Evaluation to date: none. Treatment to date: She is using topical diclofenac with a significant improvement and no apparent side effects    Depression  She has a long history of intermittent nervousness, and worrying.  There is no history of any depression at present and she continues to deny any anhedonia, difficulty concentrating, impaired memory,  or suicidal thoughts.  With melatonin she continues to average 7-8 hours of sleep nightly.    Previous Nonmelanoma Skin Cancer  She denies any new skin lesions    The following portions of the patient's history were reviewed and   updated as appropriate: allergies, current medications, past family history, past medical history, past social history, past surgical history, and problem list.    Compared to one year ago, the patient feels her physical   health is the same.    Compared to one year ago, the patient feels her mental   health is the same.    Recent Hospitalizations:  She was not admitted to the hospital during the last year.     Current Medical Providers:  Patient Care Team:  Alfonso Rubi MD as PCP - General  Alfonso Rubi MD as PCP - Family Medicine    Outpatient Medications Prior to Visit   Medication Sig Dispense Refill    cholecalciferol (VITAMIN D3) 25 MCG (1000 UT) tablet Take 1 tablet by mouth Daily. 30 tablet 5    coenzyme Q10 100 MG capsule Take 1 capsule by mouth Daily.      vitamin B-12 (CYANOCOBALAMIN) 500 MCG tablet Take 1 tablet by mouth Daily. 30 tablet 5    aspirin 81 MG EC tablet Take 2 tablets by mouth Daily.      Diclofenac Sodium (VOLTAREN) 1 % gel gel Apply 4 g topically to the appropriate area as directed 4 (Four) Times a Day As Needed (joint pain). 500 g 5    Estrogens Conjugated (PREMARIN) 0.625 MG/GM vaginal cream Insert  into the vagina 3 (Three) Times a Week. 30 g 5    fluconazole (DIFLUCAN) 150 MG tablet  Take 1 tablet by mouth Every 72 (Seventy-Two) Hours As Needed (Yeast) for up to 2 doses. 2 tablet 0    losartan (COZAAR) 50 MG tablet Take 1 tablet by mouth Daily. 30 tablet 5    phentermine 37.5 MG capsule Take 1 capsule by mouth Every Morning. 30 capsule 5    Synthroid 112 MCG tablet Take 1 tablet by mouth Daily. 30 tablet 5     No facility-administered medications prior to visit.     No opioid medication identified on active medication list. I have reviewed chart for other potential  high risk medication/s and harmful drug interactions in the elderly.        Aspirin is on active medication list. Aspirin use is not indicated based on review of current medical condition/s. Risk of harm outweighs potential benefits. Patient instructed to discontinue this medication.  .    Patient Active Problem List   Diagnosis    Osteopenia    Hypothyroidism due to acquired atrophy of thyroid    Recurrent major depressive disorder, in partial remission    Vitamin D deficiency    Class 2 severe obesity with serious comorbidity and body mass index (BMI) of 35.0 to 35.9 in adult    Menopausal symptom    Healthcare maintenance    Bilateral knee pain    Encounter for screening mammogram for breast cancer    Encounter for immunization    Asymptomatic cholelithiasis    Atrophic vaginitis    Essential hypertension    History of nonmelanoma skin cancer    Paroxysmal SVT (supraventricular tachycardia)    Recurrent urinary tract infection     Advance Care Planning  Advance Directive is not on file.  ACP discussion was held with the patient during this visit. Patient does not have an advance directive, information provided.    Review of Systems   Constitutional:  Positive for fatigue. Negative for appetite change, chills, diaphoresis and fever.   HENT:  Negative for congestion, ear pain, postnasal drip, rhinorrhea, sinus pressure, sneezing, sore throat, tinnitus and voice change.    Eyes:  Negative for visual disturbance.   Respiratory:   "Negative for cough, shortness of breath and wheezing.    Cardiovascular:  Negative for chest pain, palpitations and leg swelling.   Gastrointestinal:  Negative for abdominal pain, blood in stool, constipation, diarrhea, nausea and vomiting.   Endocrine: Negative for polydipsia and polyuria.   Genitourinary:  Negative for dysuria, frequency, hematuria, urgency, vaginal bleeding and vaginal discharge.        Vaginal dryness   Musculoskeletal:  Positive for arthralgias. Negative for back pain, joint swelling and myalgias.   Skin:  Negative for rash.   Neurological:  Negative for weakness, numbness and confusion.   Psychiatric/Behavioral:  Negative for decreased concentration, dysphoric mood, sleep disturbance and suicidal ideas. The patient is nervous/anxious.       Objective    Vitals:    09/18/23 1033   BP: 126/68   Pulse: 96   Resp: 14   Temp: 97.4 °F (36.3 °C)   TempSrc: Temporal   SpO2: 97%   Weight: 100 kg (221 lb)   Height: 167.6 cm (65.98\")     Estimated body mass index is 35.69 kg/m² as calculated from the following:    Height as of this encounter: 167.6 cm (65.98\").    Weight as of this encounter: 100 kg (221 lb).    Does the patient have evidence of cognitive impairment? No    Physical Exam  Constitutional:       General: She is not in acute distress.     Appearance: Normal appearance. She is well-developed. She is not diaphoretic.      Comments: Bright and in good spirits. No apparent distress. No pallor, jaundice, diaphoresis, or cyanosis.   HENT:      Head: Atraumatic.      Right Ear: Tympanic membrane, ear canal and external ear normal.      Left Ear: Tympanic membrane, ear canal and external ear normal.      Mouth/Throat:      Lips: No lesions.      Mouth: Mucous membranes are moist. No oral lesions.      Pharynx: No oropharyngeal exudate or posterior oropharyngeal erythema.   Eyes:      General: Lids are normal.      Extraocular Movements: Extraocular movements intact.      Conjunctiva/sclera: " Conjunctivae normal.      Pupils: Pupils are equal.   Neck:      Thyroid: No thyroid mass or thyromegaly.      Vascular: No carotid bruit or JVD.      Trachea: Trachea normal. No tracheal deviation.   Cardiovascular:      Rate and Rhythm: Normal rate and regular rhythm.      Heart sounds: Normal heart sounds, S1 normal and S2 normal. No murmur heard.    No gallop.   Pulmonary:      Effort: Pulmonary effort is normal.      Breath sounds: Normal breath sounds.   Abdominal:      General: Bowel sounds are normal. There is no distension or abdominal bruit.      Palpations: Abdomen is soft. There is no hepatomegaly, splenomegaly or mass.      Tenderness: There is no abdominal tenderness.      Hernia: No hernia is present.   Musculoskeletal:      Right lower leg: No edema.      Left lower leg: No edema.   Lymphadenopathy:      Head:      Right side of head: No submental, submandibular, tonsillar, preauricular, posterior auricular or occipital adenopathy.      Left side of head: No submental, submandibular, tonsillar, preauricular, posterior auricular or occipital adenopathy.      Cervical: No cervical adenopathy.      Upper Body:      Right upper body: No supraclavicular adenopathy.      Left upper body: No supraclavicular adenopathy.   Skin:     General: Skin is warm.      Coloration: Skin is not cyanotic, jaundiced or pale.      Findings: No rash.      Nails: There is no clubbing.   Neurological:      Mental Status: She is alert and oriented to person, place, and time.      Cranial Nerves: No cranial nerve deficit, dysarthria or facial asymmetry.      Sensory: No sensory deficit.      Motor: No tremor.      Coordination: Coordination normal.      Gait: Gait normal.   Psychiatric:         Attention and Perception: Attention normal.         Mood and Affect: Mood normal.         Speech: Speech normal.         Behavior: Behavior normal.         Thought Content: Thought content normal.     HEALTH RISK ASSESSMENT    Smoking  Status:  Social History     Tobacco Use   Smoking Status Never    Passive exposure: Never   Smokeless Tobacco Never     Alcohol Consumption:  Social History     Substance and Sexual Activity   Alcohol Use No     Fall Risk Screen:  JOHNATHONADI Fall Risk Assessment was completed, and patient is at LOW risk for falls.Assessment completed on:2023    Depression Screenin/18/2023    10:34 AM   PHQ-2/PHQ-9 Depression Screening   Little Interest or Pleasure in Doing Things 0-->not at all   Feeling Down, Depressed or Hopeless 0-->not at all   PHQ-9: Brief Depression Severity Measure Score 0     Health Habits and Functional and Cognitive Screenin/18/2023    10:33 AM   Functional & Cognitive Status   Do you have difficulty preparing food and eating? No   Do you have difficulty bathing yourself, getting dressed or grooming yourself? No   Do you have difficulty using the toilet? No   Do you have difficulty moving around from place to place? No   Do you have trouble with steps or getting out of a bed or a chair? No   Current Diet Well Balanced Diet   Dental Exam Up to date   Eye Exam Up to date   Exercise (times per week) 7 times per week   Current Exercises Include Walking   Do you need help using the phone?  No   Are you deaf or do you have serious difficulty hearing?  No   Do you need help to go to places out of walking distance? No   Do you need help shopping? No   Do you need help preparing meals?  No   Do you need help with housework?  No   Do you need help with laundry? No   Do you need help taking your medications? No   Do you need help managing money? No   Do you ever drive or ride in a car without wearing a seat belt? No   Have you felt unusual stress, anger or loneliness in the last month? No   Who do you live with? Spouse   If you need help, do you have trouble finding someone available to you? No   Have you been bothered in the last four weeks by sexual problems? No   Do you have difficulty  concentrating, remembering or making decisions? No     Age-appropriate Screening Schedule:  Refer to the list below for future screening recommendations based on patient's age, sex and/or medical conditions. Orders for these recommended tests are listed in the plan section. The patient has been provided with a written plan.    Health Maintenance   Topic Date Due    ZOSTER VACCINE (2 of 3) 03/10/2010    COVID-19 Vaccine (5 - Moderna series) 09/15/2022    DXA SCAN  05/14/2023    INFLUENZA VACCINE  10/01/2023    BMI FOLLOWUP  02/16/2024    ANNUAL WELLNESS VISIT  09/18/2024    COLORECTAL CANCER SCREENING  06/20/2029    TDAP/TD VACCINES (3 - Td or Tdap) 07/21/2032    HEPATITIS C SCREENING  Completed    Pneumococcal Vaccine 65+  Completed        Assessment & Plan   CMS Preventative Services Quick Reference  Risk Factors Identified During Encounter  Chronic Pain: Natural history and expected course discussed. Questions answered.  Depression/Dysphoria:  Continues to do well at present.  Fall Risk-High or Moderate: Discussed Fall Prevention in the home  Immunizations Discussed/Encouraged: Influenza, Shingrix, COVID19, and RSV  The above risks/problems have been discussed with the patient.  Follow up actions/plans if indicated are seen below in the Assessment/Plan Section.  Pertinent information has been shared with the patient in the After Visit Summary.    Diagnoses and all orders for this visit:    1. Essential hypertension    Hypertension: at goal. Evidence of target organ damage: none.  Encouraged to continue to work on diet and exercise plan.   Continue current medication  Updated labs drawn.  -     CBC & Differential  -     Comprehensive Metabolic Panel  -     Lipid Panel  -     losartan (COZAAR) 50 MG tablet; Take 1 tablet by mouth Daily.  Dispense: 30 tablet; Refill: 5    2. Paroxysmal SVT (supraventricular tachycardia)  Encouraged to report if any worse or if any new symptoms or concerns  -     Comprehensive  Metabolic Panel  -     TSH    3. Class 2 severe obesity with serious comorbidity and body mass index (BMI) of 35.0 to 35.9 in adult, unspecified obesity type    4. Hypothyroidism due to acquired atrophy of thyroid  Clinically euthyroid.  Continue current medication.  -     TSH  -     Synthroid 112 MCG tablet; Take 1 tablet by mouth Daily.  Dispense: 30 tablet; Refill: 5    5. Vitamin D deficiency  -     Vitamin D,25-Hydroxy    6. Asymptomatic cholelithiasis    7. Menopausal symptom  With vaginal dryness and recent urinary tract infections  Will obtain records from [facility]  Continue to use topical estrogen and report if any further issues  -     Estrogens Conjugated (PREMARIN) 0.625 MG/GM vaginal cream; Insert  into the vagina 3 (Three) Times a Week.  Dispense: 30 g; Refill: 5    8. History of nonmelanoma skin cancer    9. Chronic pain of both knees  Reminded regarding symptomatic treatment.   Continue current medication  Encouraged to report if any worse or if any new symptoms or concerns.  -     Diclofenac Sodium (VOLTAREN) 1 % gel gel; Apply 4 g topically to the appropriate area as directed 4 (Four) Times a Day As Needed (joint pain).  Dispense: 500 g; Refill: 5    10. Osteopenia, unspecified location  Encouraged to continue to pursue weight bearing activities while exercising joint protection.  We will plan on updating a DEXA scan with her next mammogram  -     Vitamin D,25-Hydroxy    11. Encounter for immunization  -     Fluzone High-Dose 65+yrs    12. Healthcare maintenance  Flu shot administered.  Recommended an updated COVID-19 vaccination.  Reviewed the potential benefits of RSV vaccination.  Reminded that she is due for Shingrix  -     Fluzone High-Dose 65+yrs    13. Class 2 severe obesity with serious comorbidity and body mass index (BMI) of 37.0 to 37.9 in adult, unspecified obesity type  -     phentermine 37.5 MG capsule; Take 1 capsule by mouth Every Morning.  Dispense: 30 capsule; Refill:  5    14. Recurrent urinary tract infection  New problem  As above.    Follow Up:   No follow-ups on file.     An After Visit Summary and PPPS were made available to the patient.

## 2023-09-18 NOTE — PATIENT INSTRUCTIONS
Advance Directive    Advance directives are legal documents that allow you to make decisions about your health care and medical treatment in case you become unable to communicate for yourself. Advance directives let your wishes be known to family, friends, and health care providers.  Discussing and writing advance directives should happen over time rather than all at once. Advance directives can be changed and updated at any time. There are different types of advance directives, such as:  Medical power of .  Living will.  Do not resuscitate (DNR) order or do not attempt resuscitation (DNAR) order.  Health care proxy and medical power of   A health care proxy is also called a health care agent. This person is appointed to make medical decisions for you when you are unable to make decisions for yourself. Generally, people ask a trusted friend or family member to act as their proxy and represent their preferences. Make sure you have an agreement with your trusted person to act as your proxy. A proxy may have to make a medical decision on your behalf if your wishes are not known.  A medical power of , also called a durable power of  for health care, is a legal document that names your health care proxy. Depending on the laws in your state, the document may need to be:  Signed.  Notarized.  Dated.  Copied.  Witnessed.  Incorporated into your medical record.  You may also want to appoint a trusted person to manage your money in the event you are unable to do so. This is called a durable power of  for finances. It is a separate legal document from the durable power of  for health care. You may choose your health care proxy or someone different to act as your agent in money matters.  If you do not appoint a proxy, or there is a concern that the proxy is not acting in your best interest, a court may appoint a guardian to act on your behalf.  Living will  A living will is a set  of instructions that state your wishes about medical care when you cannot express them yourself. Health care providers should keep a copy of your living will in your medical record. You may want to give a copy to family members or friends. To alert caregivers in case of an emergency, you can place a card in your wallet to let them know that you have a living will and where they can find it. A living will is used if you become:  Terminally ill.  Disabled.  Unable to communicate or make decisions.  The following decisions should be included in your living will:  To use or not to use life support equipment, such as dialysis machines and breathing machines (ventilators).  Whether you want a DNR or DNAR order. This tells health care providers not to use cardiopulmonary resuscitation (CPR) if breathing or heartbeat stops.  To use or not to use tube feeding.  To be given or not to be given food and fluids.  Whether you want comfort (palliative) care when the goal becomes comfort rather than a cure.  Whether you want to donate your organs and tissues.  A living will does not give instructions for distributing your money and property if you should pass away.  DNR or DNAR  A DNR or DNAR order is a request not to have CPR in the event that your heart stops beating or you stop breathing. If a DNR or DNAR order has not been made and shared, a health care provider will try to help any patient whose heart has stopped or who has stopped breathing. If you plan to have surgery, talk with your health care provider about how your DNR or DNAR order will be followed if problems occur.  What if I do not have an advance directive?  Some states assign family decision makers to act on your behalf if you do not have an advance directive. Each state has its own laws about advance directives. You may want to check with your health care provider, , or state representative about the laws in your state.  Summary  Advance directives are  legal documents that allow you to make decisions about your health care and medical treatment in case you become unable to communicate for yourself.  The process of discussing and writing advance directives should happen over time. You can change and update advance directives at any time.  Advance directives may include a medical power of , a living will, and a DNR or DNAR order.  This information is not intended to replace advice given to you by your health care provider. Make sure you discuss any questions you have with your health care provider.  Document Revised: 09/21/2021 Document Reviewed: 09/21/2021  [employer] Patient Education © 2023 [employer]  Fall Prevention in the Home, Adult  Falls can cause injuries and can happen to people of all ages. There are many things you can do to make your home safe and to help prevent falls. Ask for help when making these changes.  What actions can I take to prevent falls?  General Instructions  Use good lighting in all rooms. Replace any light bulbs that burn out.  Turn on the lights in dark areas. Use night-lights.  Keep items that you use often in easy-to-reach places. Lower the shelves around your home if needed.  Set up your furniture so you have a clear path. Avoid moving your furniture around.  Do not have throw rugs or other things on the floor that can make you trip.  Avoid walking on wet floors.  If any of your floors are uneven, fix them.  Add color or contrast paint or tape to clearly maximiliano and help you see:  Grab bars or handrails.  First and last steps of staircases.  Where the edge of each step is.  If you use a stepladder:  Make sure that it is fully opened. Do not climb a closed stepladder.  Make sure the sides of the stepladder are locked in place.  Ask someone to hold the stepladder while you use it.  Know where your pets are when moving through your home.  What can I do in the bathroom?         Keep the floor dry. Clean up any water on the floor  right away.  Remove soap buildup in the tub or shower.  Use nonskid mats or decals on the floor of the tub or shower.  Attach bath mats securely with double-sided, nonslip rug tape.  If you need to sit down in the shower, use a plastic, nonslip stool.  Install grab bars by the toilet and in the tub and shower. Do not use towel bars as grab bars.  What can I do in the bedroom?  Make sure that you have a light by your bed that is easy to reach.  Do not use any sheets or blankets for your bed that hang to the floor.  Have a firm chair with side arms that you can use for support when you get dressed.  What can I do in the kitchen?  Clean up any spills right away.  If you need to reach something above you, use a step stool with a grab bar.  Keep electrical cords out of the way.  Do not use floor polish or wax that makes floors slippery.  What can I do with my stairs?  Do not leave any items on the stairs.  Make sure that you have a light switch at the top and the bottom of the stairs.  Make sure that there are handrails on both sides of the stairs. Fix handrails that are broken or loose.  Install nonslip stair treads on all your stairs.  Avoid having throw rugs at the top or bottom of the stairs.  Choose a carpet that does not hide the edge of the steps on the stairs.  Check carpeting to make sure that it is firmly attached to the stairs. Fix carpet that is loose or worn.  What can I do on the outside of my home?  Use bright outdoor lighting.  Fix the edges of walkways and driveways and fix any cracks.  Remove anything that might make you trip as you walk through a door, such as a raised step or threshold.  Trim any bushes or trees on paths to your home.  Check to see if handrails are loose or broken and that both sides of all steps have handrails.  Install guardrails along the edges of any raised decks and porches.  Clear paths of anything that can make you trip, such as tools or rocks.  Have leaves, snow, or ice  cleared regularly.  Use sand or salt on paths during winter.  Clean up any spills in your garage right away. This includes grease or oil spills.  What other actions can I take?  Wear shoes that:  Have a low heel. Do not wear high heels.  Have rubber bottoms.  Feel good on your feet and fit well.  Are closed at the toe. Do not wear open-toe sandals.  Use tools that help you move around if needed. These include:  Canes.  Walkers.  Scooters.  Crutches.  Review your medicines with your doctor. Some medicines can make you feel dizzy. This can increase your chance of falling.  Ask your doctor what else you can do to help prevent falls.  Where to find more information  Centers for Disease Control and Prevention, [employer]: www.cdc.gov  [place] [employer]: www.julianne.nih.gov  Contact a doctor if:  You are afraid of falling at home.  You feel weak, drowsy, or dizzy at home.  You fall at home.  Summary  There are many simple things that you can do to make your home safe and to help prevent falls.  Ways to make your home safe include removing things that can make you trip and installing grab bars in the bathroom.  Ask for help when making these changes in your home.  This information is not intended to replace advice given to you by your health care provider. Make sure you discuss any questions you have with your health care provider.  Document Revised: 09/19/2022 Document Reviewed: 07/21/2021  [employer] Patient Education © 2023 [employer]

## 2023-09-19 LAB
25(OH)D3 SERPL-MCNC: 54.6 NG/ML (ref 30–100)
ALBUMIN SERPL-MCNC: 4.3 G/DL (ref 3.5–5.2)
ALBUMIN/GLOB SERPL: 1.4 G/DL
ALP SERPL-CCNC: 95 U/L (ref 39–117)
ALT SERPL W P-5'-P-CCNC: 13 U/L (ref 1–33)
ANION GAP SERPL CALCULATED.3IONS-SCNC: 10.7 MMOL/L (ref 5–15)
AST SERPL-CCNC: 20 U/L (ref 1–32)
BASOPHILS # BLD AUTO: 0.09 10*3/MM3 (ref 0–0.2)
BASOPHILS NFR BLD AUTO: 1.4 % (ref 0–1.5)
BILIRUB SERPL-MCNC: 0.3 MG/DL (ref 0–1.2)
BUN SERPL-MCNC: 12 MG/DL (ref 8–23)
BUN/CREAT SERPL: 10.7 (ref 7–25)
CALCIUM SPEC-SCNC: 9.5 MG/DL (ref 8.6–10.5)
CHLORIDE SERPL-SCNC: 102 MMOL/L (ref 98–107)
CHOLEST SERPL-MCNC: 202 MG/DL (ref 0–200)
CO2 SERPL-SCNC: 24.3 MMOL/L (ref 22–29)
CREAT SERPL-MCNC: 1.12 MG/DL (ref 0.57–1)
DEPRECATED RDW RBC AUTO: 40.1 FL (ref 37–54)
EGFRCR SERPLBLD CKD-EPI 2021: 51.1 ML/MIN/1.73
EOSINOPHIL # BLD AUTO: 0.2 10*3/MM3 (ref 0–0.4)
EOSINOPHIL NFR BLD AUTO: 3 % (ref 0.3–6.2)
ERYTHROCYTE [DISTWIDTH] IN BLOOD BY AUTOMATED COUNT: 12.3 % (ref 12.3–15.4)
GLOBULIN UR ELPH-MCNC: 3.1 GM/DL
GLUCOSE SERPL-MCNC: 97 MG/DL (ref 65–99)
HCT VFR BLD AUTO: 37.6 % (ref 34–46.6)
HDLC SERPL-MCNC: 57 MG/DL (ref 40–60)
HGB BLD-MCNC: 12.8 G/DL (ref 12–15.9)
IMM GRANULOCYTES # BLD AUTO: 0.02 10*3/MM3 (ref 0–0.05)
IMM GRANULOCYTES NFR BLD AUTO: 0.3 % (ref 0–0.5)
LDLC SERPL CALC-MCNC: 132 MG/DL (ref 0–100)
LDLC/HDLC SERPL: 2.29 {RATIO}
LYMPHOCYTES # BLD AUTO: 1.18 10*3/MM3 (ref 0.7–3.1)
LYMPHOCYTES NFR BLD AUTO: 17.9 % (ref 19.6–45.3)
MCH RBC QN AUTO: 30.3 PG (ref 26.6–33)
MCHC RBC AUTO-ENTMCNC: 34 G/DL (ref 31.5–35.7)
MCV RBC AUTO: 89.1 FL (ref 79–97)
MONOCYTES # BLD AUTO: 0.44 10*3/MM3 (ref 0.1–0.9)
MONOCYTES NFR BLD AUTO: 6.7 % (ref 5–12)
NEUTROPHILS NFR BLD AUTO: 4.67 10*3/MM3 (ref 1.7–7)
NEUTROPHILS NFR BLD AUTO: 70.7 % (ref 42.7–76)
NRBC BLD AUTO-RTO: 0 /100 WBC (ref 0–0.2)
PLATELET # BLD AUTO: 432 10*3/MM3 (ref 140–450)
PMV BLD AUTO: 10.4 FL (ref 6–12)
POTASSIUM SERPL-SCNC: 4.5 MMOL/L (ref 3.5–5.2)
PROT SERPL-MCNC: 7.4 G/DL (ref 6–8.5)
RBC # BLD AUTO: 4.22 10*6/MM3 (ref 3.77–5.28)
SODIUM SERPL-SCNC: 137 MMOL/L (ref 136–145)
TRIGL SERPL-MCNC: 71 MG/DL (ref 0–150)
TSH SERPL DL<=0.05 MIU/L-ACNC: 3.52 UIU/ML (ref 0.27–4.2)
VLDLC SERPL-MCNC: 13 MG/DL (ref 5–40)
WBC NRBC COR # BLD: 6.6 10*3/MM3 (ref 3.4–10.8)

## 2023-09-28 ENCOUNTER — OFFICE VISIT (OUTPATIENT)
Dept: FAMILY MEDICINE CLINIC | Facility: CLINIC | Age: 78
End: 2023-09-28
Payer: MEDICARE

## 2023-09-28 VITALS
HEART RATE: 81 BPM | WEIGHT: 219 LBS | OXYGEN SATURATION: 97 % | BODY MASS INDEX: 35.2 KG/M2 | SYSTOLIC BLOOD PRESSURE: 130 MMHG | HEIGHT: 66 IN | TEMPERATURE: 97.2 F | DIASTOLIC BLOOD PRESSURE: 80 MMHG | RESPIRATION RATE: 14 BRPM

## 2023-09-28 DIAGNOSIS — M25.562 LEFT KNEE PAIN, UNSPECIFIED CHRONICITY: Primary | ICD-10-CM

## 2023-09-28 PROCEDURE — 3075F SYST BP GE 130 - 139MM HG: CPT | Performed by: NURSE PRACTITIONER

## 2023-09-28 PROCEDURE — 1159F MED LIST DOCD IN RCRD: CPT | Performed by: NURSE PRACTITIONER

## 2023-09-28 PROCEDURE — 1160F RVW MEDS BY RX/DR IN RCRD: CPT | Performed by: NURSE PRACTITIONER

## 2023-09-28 PROCEDURE — 99213 OFFICE O/P EST LOW 20 MIN: CPT | Performed by: NURSE PRACTITIONER

## 2023-09-28 PROCEDURE — 3079F DIAST BP 80-89 MM HG: CPT | Performed by: NURSE PRACTITIONER

## 2023-09-28 NOTE — PROGRESS NOTES
History of Present Illness  Tennille Gasca is a 78 y.o. female who presents to the clinic today complaining of left knee pain which started 6 days.  She was finishing some home.maintenance while on a ladder.  When she was climbing off the ladder, she noted left knee pain. She started putting topical gel, resting and elevating which has gradually helped. She restarted aspirin as well. This morning she noted a significant improvement.  She has had bilateral knee pain for several years with treatment including topical gel.    Knee Pain   The incident occurred 5 to 7 days ago. There was no injury mechanism. The pain is present in the left knee. The pain is mild. She has tried elevation and rest for the symptoms. The treatment provided moderate relief.     The following portions of the patient's history were reviewed and updated as appropriate: allergies, current medications, past family history, past medical history, past social history, past surgical history and problem list.    Review of Systems   Constitutional:  Positive for activity change. Negative for appetite change, chills, fatigue, fever and unexpected weight change.   HENT: Negative.     Eyes:  Negative for visual disturbance.   Respiratory:  Negative for cough, shortness of breath and wheezing.    Cardiovascular:  Negative for chest pain, palpitations and leg swelling.   Gastrointestinal:  Negative for nausea and vomiting.   Endocrine: Negative for cold intolerance, heat intolerance, polydipsia, polyphagia and polyuria.   Musculoskeletal:  Positive for arthralgias, back pain, gait problem and joint swelling.   Skin:  Negative for color change and rash.   Neurological:  Negative for dizziness, tremors, speech difficulty, weakness, light-headedness and headaches.   Hematological:  Negative for adenopathy.   Psychiatric/Behavioral:  Negative for confusion and decreased concentration. The patient is not nervous/anxious.    All other systems reviewed and are  "negative.    Vital signs:  /80 (BP Location: Left arm, Patient Position: Sitting, Cuff Size: Adult)   Pulse 81   Temp 97.2 °F (36.2 °C) (Temporal)   Resp 14   Ht 167.6 cm (65.98\")   Wt 99.3 kg (219 lb)   SpO2 97%   BMI 35.36 kg/m²     Physical Exam  Vitals and nursing note reviewed.   Constitutional:       General: She is not in acute distress.     Appearance: She is well-developed.   HENT:      Head: Normocephalic.      Nose: Nose normal.   Eyes:      General: No scleral icterus.        Right eye: No discharge.         Left eye: No discharge.      Conjunctiva/sclera: Conjunctivae normal.   Neck:      Thyroid: No thyromegaly.      Vascular: No JVD.   Cardiovascular:      Rate and Rhythm: Normal rate and regular rhythm.      Heart sounds: Normal heart sounds. No murmur heard.    No friction rub.   Pulmonary:      Effort: Pulmonary effort is normal. No respiratory distress.      Breath sounds: Normal breath sounds. No wheezing or rales.   Musculoskeletal:         General: Swelling and tenderness present.      Cervical back: Neck supple.      Left knee: Decreased range of motion. Tenderness present.   Lymphadenopathy:      Cervical: No cervical adenopathy.   Skin:     General: Skin is warm and dry.      Findings: No erythema or rash.   Neurological:      Mental Status: She is alert and oriented to person, place, and time.   Psychiatric:         Mood and Affect: Mood and affect normal.         Speech: Speech normal.         Behavior: Behavior is cooperative.         Thought Content: Thought content normal.     Class 2 Severe Obesity (BMI >=35 and <=39.9). Obesity-related health conditions include the following: hypertension and Osteopenia . Obesity is improving with lifestyle modifications.BMI is above average; BMI management plan is completed. Provided information on portion control and increasing exercise.      Assessment & Plan     Diagnoses and all orders for this visit:    1. Left knee pain, " unspecified chronicity (Primary)  -     XR Knee 3 View Left; Future    Follow Up If or do not improve or do not improve  Findings and recommendations discussed with Tennille. Reviewed treatment options.  Counseled regarding supportive care measures.  At this time, she would like to continue to monitor and continue to use her current treatment plan due to the significant improvement today.  Signs and symptoms of concern reviewed and if occur to seek further evaluation symptoms worsen or do not improve.  An order for left knee x-ray was placed for her if needed.  Lifestyle modifications reinforced including nutrition and activity recommendations.   Tennille was given instructions and counseling regarding her condition or for health maintenance advice. Please see specific information pulled into the AVS if appropriate    This document has been electronically signed by:

## 2023-10-16 ENCOUNTER — TELEPHONE (OUTPATIENT)
Dept: FAMILY MEDICINE CLINIC | Facility: CLINIC | Age: 78
End: 2023-10-16

## 2023-10-16 ENCOUNTER — LAB (OUTPATIENT)
Dept: FAMILY MEDICINE CLINIC | Facility: CLINIC | Age: 78
End: 2023-10-16
Payer: MEDICARE

## 2023-10-16 DIAGNOSIS — R30.0 DYSURIA: Primary | ICD-10-CM

## 2023-10-16 PROCEDURE — 87086 URINE CULTURE/COLONY COUNT: CPT | Performed by: GENERAL PRACTICE

## 2023-10-16 RX ORDER — CIPROFLOXACIN 250 MG/1
250 TABLET, FILM COATED ORAL 2 TIMES DAILY
Qty: 10 TABLET | Refills: 0 | Status: SHIPPED | OUTPATIENT
Start: 2023-10-16 | End: 2023-10-21

## 2023-10-16 NOTE — TELEPHONE ENCOUNTER
Caller: Tennille Gasca    Relationship: Self    Best call back number: [phone]    What medication are you requesting: ANTIBIOTIC    What are your current symptoms: PAINFUL URINATION, PRESSURE.     How long have you been experiencing symptoms: YESTERDAY    Have you had these symptoms before:    [x] Yes  [] No    Have you been treated for these symptoms before:   [x] Yes  [] No    If a prescription is needed, what is your preferred pharmacy and phone number: [address] [place] [address] - [phone]  - [phone] FX     Additional notes: PATIENT STATES THAT SHE TOOK A HOME TEST AND IT WAS POSITIVE FOR UTI

## 2023-10-17 LAB — BACTERIA SPEC AEROBE CULT: NORMAL

## 2023-11-27 ENCOUNTER — CLINICAL SUPPORT (OUTPATIENT)
Dept: FAMILY MEDICINE CLINIC | Facility: CLINIC | Age: 78
End: 2023-11-27
Payer: MEDICARE

## 2023-11-27 ENCOUNTER — TELEPHONE (OUTPATIENT)
Dept: FAMILY MEDICINE CLINIC | Facility: CLINIC | Age: 78
End: 2023-11-27

## 2023-11-27 DIAGNOSIS — N30.00 ACUTE CYSTITIS WITHOUT HEMATURIA: ICD-10-CM

## 2023-11-27 DIAGNOSIS — R39.9 LOWER URINARY TRACT SYMPTOMS (LUTS): Primary | ICD-10-CM

## 2023-11-27 DIAGNOSIS — N30.00 ACUTE CYSTITIS WITHOUT HEMATURIA: Primary | ICD-10-CM

## 2023-11-27 PROCEDURE — 81001 URINALYSIS AUTO W/SCOPE: CPT | Performed by: GENERAL PRACTICE

## 2023-11-27 RX ORDER — CEFDINIR 300 MG/1
300 CAPSULE ORAL 2 TIMES DAILY
Qty: 10 CAPSULE | Refills: 0 | Status: SHIPPED | OUTPATIENT
Start: 2023-11-27 | End: 2023-12-02

## 2023-11-27 NOTE — TELEPHONE ENCOUNTER
Caller: Tennille Gasca    Relationship: Self    Best call back number: [phone]     What medication are you requesting: BLADDER INFECTION    What are your current symptoms: BURNING WITH URINATION    How long have you been experiencing symptoms: SINCE YESTERDAY    Have you had these symptoms before:    [x] Yes  [] No    Have you been treated for these symptoms before:   [x] Yes  [] No    If a prescription is needed, what is your preferred pharmacy and phone number: [address] [place] [address] - [phone]  - [phone] FX     Additional notes:  SHE DID A AT HOME TEST AND IT IS POSITIVE FOR BLADDER INFECTION    PLEASE CALL WHEN THIS IS SENT IN. YOU HAVE PERMISSION TO LEAVE A VOICE MESSAGE

## 2023-11-28 LAB
BACTERIA UR QL AUTO: ABNORMAL /HPF
BILIRUB UR QL STRIP: NEGATIVE
CLARITY UR: CLEAR
COLOR UR: YELLOW
GLUCOSE UR STRIP-MCNC: NEGATIVE MG/DL
HGB UR QL STRIP.AUTO: NEGATIVE
HOLD SPECIMEN: NORMAL
HYALINE CASTS UR QL AUTO: ABNORMAL /LPF
KETONES UR QL STRIP: NEGATIVE
LEUKOCYTE ESTERASE UR QL STRIP.AUTO: ABNORMAL
NITRITE UR QL STRIP: NEGATIVE
PH UR STRIP.AUTO: 6 [PH] (ref 5–8)
PROT UR QL STRIP: NEGATIVE
RBC # UR STRIP: ABNORMAL /HPF
REF LAB TEST METHOD: ABNORMAL
SP GR UR STRIP: 1.01 (ref 1–1.03)
SQUAMOUS #/AREA URNS HPF: ABNORMAL /HPF
UROBILINOGEN UR QL STRIP: ABNORMAL
WBC # UR STRIP: ABNORMAL /HPF

## 2024-03-18 ENCOUNTER — OFFICE VISIT (OUTPATIENT)
Dept: FAMILY MEDICINE CLINIC | Facility: CLINIC | Age: 79
End: 2024-03-18
Payer: MEDICARE

## 2024-03-18 VITALS
BODY MASS INDEX: 35.2 KG/M2 | RESPIRATION RATE: 14 BRPM | DIASTOLIC BLOOD PRESSURE: 62 MMHG | HEART RATE: 98 BPM | TEMPERATURE: 97.1 F | WEIGHT: 219 LBS | OXYGEN SATURATION: 99 % | HEIGHT: 66 IN | SYSTOLIC BLOOD PRESSURE: 138 MMHG

## 2024-03-18 DIAGNOSIS — M25.562 CHRONIC PAIN OF BOTH KNEES: ICD-10-CM

## 2024-03-18 DIAGNOSIS — G89.29 CHRONIC PAIN OF BOTH KNEES: ICD-10-CM

## 2024-03-18 DIAGNOSIS — F33.41 RECURRENT MAJOR DEPRESSIVE DISORDER, IN PARTIAL REMISSION: ICD-10-CM

## 2024-03-18 DIAGNOSIS — E03.4 HYPOTHYROIDISM DUE TO ACQUIRED ATROPHY OF THYROID: ICD-10-CM

## 2024-03-18 DIAGNOSIS — M85.80 OSTEOPENIA, UNSPECIFIED LOCATION: ICD-10-CM

## 2024-03-18 DIAGNOSIS — Z00.00 HEALTHCARE MAINTENANCE: ICD-10-CM

## 2024-03-18 DIAGNOSIS — I47.10 PAROXYSMAL SVT (SUPRAVENTRICULAR TACHYCARDIA): Primary | ICD-10-CM

## 2024-03-18 DIAGNOSIS — I10 ESSENTIAL HYPERTENSION: ICD-10-CM

## 2024-03-18 DIAGNOSIS — E66.01 CLASS 2 SEVERE OBESITY WITH SERIOUS COMORBIDITY AND BODY MASS INDEX (BMI) OF 37.0 TO 37.9 IN ADULT, UNSPECIFIED OBESITY TYPE: ICD-10-CM

## 2024-03-18 DIAGNOSIS — E55.9 VITAMIN D DEFICIENCY: ICD-10-CM

## 2024-03-18 DIAGNOSIS — N95.1 MENOPAUSAL SYMPTOM: ICD-10-CM

## 2024-03-18 DIAGNOSIS — N95.2 ATROPHIC VAGINITIS: ICD-10-CM

## 2024-03-18 DIAGNOSIS — M25.561 CHRONIC PAIN OF BOTH KNEES: ICD-10-CM

## 2024-03-18 DIAGNOSIS — N39.0 RECURRENT URINARY TRACT INFECTION: ICD-10-CM

## 2024-03-18 DIAGNOSIS — E66.01 CLASS 2 SEVERE OBESITY WITH SERIOUS COMORBIDITY AND BODY MASS INDEX (BMI) OF 35.0 TO 35.9 IN ADULT, UNSPECIFIED OBESITY TYPE: ICD-10-CM

## 2024-03-18 PROCEDURE — 81001 URINALYSIS AUTO W/SCOPE: CPT | Performed by: GENERAL PRACTICE

## 2024-03-18 PROCEDURE — 87086 URINE CULTURE/COLONY COUNT: CPT | Performed by: GENERAL PRACTICE

## 2024-03-18 PROCEDURE — 87186 SC STD MICRODIL/AGAR DIL: CPT | Performed by: GENERAL PRACTICE

## 2024-03-18 PROCEDURE — 87077 CULTURE AEROBIC IDENTIFY: CPT | Performed by: GENERAL PRACTICE

## 2024-03-18 RX ORDER — LEVOTHYROXINE SODIUM 112 MCG
112 TABLET ORAL DAILY
Qty: 30 TABLET | Refills: 5 | Status: SHIPPED | OUTPATIENT
Start: 2024-03-18

## 2024-03-18 RX ORDER — PHENTERMINE HYDROCHLORIDE 37.5 MG/1
37.5 CAPSULE ORAL EVERY MORNING
Qty: 30 CAPSULE | Refills: 5 | Status: SHIPPED | OUTPATIENT
Start: 2024-03-18

## 2024-03-18 RX ORDER — LOSARTAN POTASSIUM 50 MG/1
50 TABLET ORAL DAILY
Qty: 30 TABLET | Refills: 5 | Status: SHIPPED | OUTPATIENT
Start: 2024-03-18

## 2024-03-18 RX ORDER — CEFDINIR 300 MG/1
300 CAPSULE ORAL 2 TIMES DAILY
Qty: 14 CAPSULE | Refills: 0 | Status: SHIPPED | OUTPATIENT
Start: 2024-03-18

## 2024-03-18 NOTE — PROGRESS NOTES
Subjective   Tennille Gasca is a 79 y.o. female.     Chief Complaint  She returns for a scheduled reassessment of multiple medical problems including recurrent urinary tract infections, essential hypertension, hypothyroidism, chronic knee pain, and depression    History of Present Illness     Recurrent Urinary Tract Infections  She returns with a 1 day history of dysuria. She has a history of vaginal dryness and admits that she has not been using topical estrogen as consistently as of late.  She denies any frequency, nocturia, urgency, hematuria, vaginal discharge or bleeding, and there is no history of any fever, chills, or night sweats.    Essential Hypertension  She has remained fairly active with a continued improvement in her shortness of breath.  There is no history of any orthopnea or PND and she continues to deny any chest pain, palpitations lightheadedness, or swelling of the ankles.  She remains on losartan 50 nightly with no apparent side effects.  48-hour Holter monitoring completed on 3/1/2023 revealed 2 short 8-14 beat runs of SVT (possible atrial flutter).  Echocardiogram performed on 3/19/2023 was reported as showing grade 1 diastolic dysfunction, sclerosis of the aortic valve with mild stenosis, mild MR and TR, but normal systolic function with an estimated EF of 61 to 65%.  Stress testing performed the same day revealed no evidence of inducible ischemia  Lab Results   Component Value Date    GLUCOSE 97 09/18/2023    BUN 12 09/18/2023    CREATININE 1.12 (H) 09/18/2023    EGFR 51.1 (L) 09/18/2023    BCR 10.7 09/18/2023    K 4.5 09/18/2023    CO2 24.3 09/18/2023    CALCIUM 9.5 09/18/2023    ALBUMIN 4.3 09/18/2023    BILITOT 0.3 09/18/2023    AST 20 09/18/2023    ALT 13 09/18/2023     Lab Results   Component Value Date    ALKPHOS 95 09/18/2023     Lab Results   Component Value Date    CHOL 202 (H) 09/18/2023    CHLPL 197 03/17/2016    TRIG 71 09/18/2023    HDL 57 09/18/2023     (H) 09/18/2023      Hypothyroidism  She admits to fatigue, and struggles with her weight. Her hypothyroidism is due to Hashimoto's disease. She remains on levothyroxine 112 daily.   Lab Results   Component Value Date    TSH 3.520 09/18/2023     Knee Pain  She continues to have intermittent knee pain as previously described. There has been no change in the quality nor any new associated symptoms.  There is no history of any giving way or locking. The pain is aggravated by prolonged weightbearing and with ladders. Evaluation to date: none. Treatment to date: She is using topical diclofenac with a significant improvement and no apparent side effects    Depression  She has a long history of intermittent nervousness, and worrying.  There is no history of any depression at present and she continues to deny any anhedonia, difficulty concentrating, impaired memory,  or suicidal thoughts.  With melatonin she continues to average 7-8 hours of sleep nightly.    Previous Nonmelanoma Skin Cancer  She continues to deny any new skin lesions    Labs  Most recent vitamin D 54.6    The following portions of the patient's history were reviewed and updated as appropriate: allergies, current medications, past medical history, past social history, and problem list.    Review of Systems   Constitutional:  Positive for fatigue. Negative for chills and fever.   HENT:  Negative for congestion, ear pain, hearing loss, postnasal drip, rhinorrhea, sinus pressure, sneezing and sore throat.    Eyes:  Negative for visual disturbance.   Respiratory:  Negative for cough, shortness of breath and wheezing.    Cardiovascular:  Negative for chest pain, palpitations and leg swelling.   Gastrointestinal:  Negative for abdominal pain, blood in stool, constipation, diarrhea, nausea, vomiting and GERD.   Genitourinary:  Positive for dysuria and vaginal pain (dryness). Negative for flank pain, frequency, hematuria, pelvic pain, urgency and urinary incontinence.    Musculoskeletal:  Positive for arthralgias. Negative for back pain, joint swelling and myalgias.   Skin:  Negative for rash.   Neurological:  Negative for weakness and numbness.   Psychiatric/Behavioral:  Negative for decreased concentration, hallucinations, sleep disturbance and depressed mood. The patient is nervous/anxious.      Objective   Physical Exam  Constitutional:       General: She is not in acute distress.     Appearance: Normal appearance. She is well-developed. She is not diaphoretic.      Comments: Bright and in good spirits. No apparent distress. No pallor, jaundice, diaphoresis, or cyanosis.   HENT:      Head: Atraumatic.      Right Ear: Tympanic membrane, ear canal and external ear normal.      Left Ear: Tympanic membrane, ear canal and external ear normal.      Mouth/Throat:      Lips: No lesions.      Mouth: Mucous membranes are moist. No oral lesions.      Pharynx: No oropharyngeal exudate or posterior oropharyngeal erythema.   Eyes:      General: Lids are normal.      Extraocular Movements: Extraocular movements intact.      Conjunctiva/sclera: Conjunctivae normal.      Pupils: Pupils are equal.   Neck:      Thyroid: No thyroid mass or thyromegaly.      Vascular: No carotid bruit or JVD.      Trachea: Trachea normal. No tracheal deviation.   Cardiovascular:      Rate and Rhythm: Normal rate and regular rhythm.      Heart sounds: Normal heart sounds, S1 normal and S2 normal. No murmur heard.     No gallop.   Pulmonary:      Effort: Pulmonary effort is normal.      Breath sounds: Normal breath sounds.   Abdominal:      General: Bowel sounds are normal. There is no distension.      Tenderness: There is no right CVA tenderness or left CVA tenderness.   Musculoskeletal:      Right lower leg: No edema.      Left lower leg: No edema.   Lymphadenopathy:      Head:      Right side of head: No submental, submandibular, tonsillar, preauricular, posterior auricular or occipital adenopathy.      Left side  of head: No submental, submandibular, tonsillar, preauricular, posterior auricular or occipital adenopathy.      Cervical: No cervical adenopathy.      Upper Body:      Right upper body: No supraclavicular adenopathy.      Left upper body: No supraclavicular adenopathy.   Skin:     General: Skin is warm.      Coloration: Skin is not cyanotic, jaundiced or pale.      Findings: No rash.      Nails: There is no clubbing.   Neurological:      Mental Status: She is alert and oriented to person, place, and time.      Cranial Nerves: No cranial nerve deficit, dysarthria or facial asymmetry.      Sensory: No sensory deficit.      Motor: No tremor.      Coordination: Coordination normal.      Gait: Gait normal.   Psychiatric:         Attention and Perception: Attention normal.         Mood and Affect: Mood normal.         Speech: Speech normal.         Behavior: Behavior normal.         Thought Content: Thought content normal.       Assessment & Plan   Problems Addressed this Visit          Cardiac and Vasculature    Essential hypertension   Hypertension: at goal. Evidence of target organ damage: none.  Encouraged to continue to work on diet and exercise plan.   Continue current medication    Relevant Medications    losartan (COZAAR) 50 MG tablet    Paroxysmal SVT (supraventricular tachycardia)        Endocrine and Metabolic    Class 2 severe obesity with serious comorbidity and body mass index (BMI) of 35.0 to 35.9 in adult  As above.   Continue current medication.    Relevant Medications    phentermine 37.5 MG capsule    Hypothyroidism due to acquired atrophy of thyroid  Clinically euthyroid.  Continue current medication.  Updated labs will be drawn at her return.    Relevant Medications    Synthroid 112 MCG tablet    Vitamin D deficiency       Genitourinary and Reproductive     Atrophic vaginitis  Encouraged to use topical estrogen consistently    Menopausal symptom    Relevant Medications    Estrogens Conjugated  (PREMARIN) 0.625 MG/GM vaginal cream    Other Relevant Orders    Urinalysis With Culture If Indicated - Urine, Clean Catch    [place] Urine Culture Tube - Urine, Clean Catch    Recurrent urinary tract infection  Urine dip positive for leukocytes  Will start on cefdinir while awaiting the results of culture  Encouraged report if any worse or for any new symptoms or concerns in the meantime    Relevant Medications    cefdinir (OMNICEF) 300 MG capsule    Other Relevant Orders    Urinalysis With Culture If Indicated - Urine, Clean Catch    [place] Urine Culture Tube - Urine, Clean Catch       Health Encounters    Healthcare maintenance  Recommended RSV and an updated COVID-19 vaccination.  Reminded that she is due for Shingrix  Will arrange an updated mammogram and DEXA scan at her return       Mental Health    Recurrent major depressive disorder, in partial remission     Stable.   Encouraged to report if any worse or if any new symptoms or concerns.           Musculoskeletal and Injuries    Bilateral knee pain  Reminded regarding symptomatic treatment.   Continue current medication  Encouraged to report if any worse or if any new symptoms or concerns.    Relevant Medications    Diclofenac Sodium (VOLTAREN) 1 % gel gel    Osteopenia     Diagnoses         Codes Comments    Paroxysmal SVT (supraventricular tachycardia)    -  Primary ICD-10-CM: I47.10  ICD-9-CM: 427.0     Essential hypertension     ICD-10-CM: I10  ICD-9-CM: 401.9     Vitamin D deficiency     ICD-10-CM: E55.9  ICD-9-CM: 268.9     Hypothyroidism due to acquired atrophy of thyroid     ICD-10-CM: E03.4  ICD-9-CM: 244.8, 246.8     Class 2 severe obesity with serious comorbidity and body mass index (BMI) of 35.0 to 35.9 in adult, unspecified obesity type     ICD-10-CM: E66.01, Z68.35  ICD-9-CM: 278.01, V85.35     Menopausal symptom     ICD-10-CM: N95.1  ICD-9-CM: 627.2     Healthcare maintenance     ICD-10-CM: Z00.00  ICD-9-CM: V70.0     Recurrent major  depressive disorder, in partial remission     ICD-10-CM: F33.41  ICD-9-CM: 296.35     Osteopenia, unspecified location     ICD-10-CM: M85.80  ICD-9-CM: 733.90     Chronic pain of both knees     ICD-10-CM: M25.561, M25.562, G89.29  ICD-9-CM: 719.46, 338.29     Recurrent urinary tract infection     ICD-10-CM: N39.0  ICD-9-CM: 599.0     Class 2 severe obesity with serious comorbidity and body mass index (BMI) of 37.0 to 37.9 in adult, unspecified obesity type     ICD-10-CM: E66.01, Z68.37  ICD-9-CM: 278.01, V85.37     Atrophic vaginitis     ICD-10-CM: N95.2  ICD-9-CM: 627.3

## 2024-03-19 LAB
BACTERIA UR QL AUTO: ABNORMAL /HPF
BILIRUB UR QL STRIP: NEGATIVE
CLARITY UR: CLEAR
COLOR UR: YELLOW
GLUCOSE UR STRIP-MCNC: NEGATIVE MG/DL
HGB UR QL STRIP.AUTO: NEGATIVE
HOLD SPECIMEN: NORMAL
HYALINE CASTS UR QL AUTO: ABNORMAL /LPF
KETONES UR QL STRIP: NEGATIVE
LEUKOCYTE ESTERASE UR QL STRIP.AUTO: ABNORMAL
NITRITE UR QL STRIP: NEGATIVE
PH UR STRIP.AUTO: 6.5 [PH] (ref 5–8)
PROT UR QL STRIP: NEGATIVE
RBC # UR STRIP: ABNORMAL /HPF
REF LAB TEST METHOD: ABNORMAL
SP GR UR STRIP: 1.01 (ref 1–1.03)
SQUAMOUS #/AREA URNS HPF: ABNORMAL /HPF
UROBILINOGEN UR QL STRIP: ABNORMAL
WBC # UR STRIP: ABNORMAL /HPF

## 2024-03-22 LAB
BACTERIA SPEC AEROBE CULT: ABNORMAL
BACTERIA SPEC AEROBE CULT: ABNORMAL

## 2024-04-11 ENCOUNTER — TELEPHONE (OUTPATIENT)
Dept: FAMILY MEDICINE CLINIC | Facility: CLINIC | Age: 79
End: 2024-04-11

## 2024-04-11 RX ORDER — CIPROFLOXACIN 250 MG/1
250 TABLET, FILM COATED ORAL 2 TIMES DAILY
Qty: 14 TABLET | Refills: 0 | Status: SHIPPED | OUTPATIENT
Start: 2024-04-11 | End: 2024-04-18

## 2024-04-12 ENCOUNTER — LAB (OUTPATIENT)
Dept: FAMILY MEDICINE CLINIC | Facility: CLINIC | Age: 79
End: 2024-04-12
Payer: MEDICARE

## 2024-04-12 DIAGNOSIS — R39.9 LOWER URINARY TRACT SYMPTOMS (LUTS): Primary | ICD-10-CM

## 2024-04-12 PROCEDURE — 81001 URINALYSIS AUTO W/SCOPE: CPT | Performed by: GENERAL PRACTICE

## 2024-06-14 DIAGNOSIS — N95.1 MENOPAUSAL SYMPTOM: ICD-10-CM

## 2024-06-14 NOTE — TELEPHONE ENCOUNTER
Caller: [place]. [facility] [address] [place] [address] C - [phone] [facility] [phone] FX    Relationship: Pharmacy    Best call back number: [phone]     Requested Prescriptions:   Requested Prescriptions     Pending Prescriptions Disp Refills    Estrogens Conjugated (PREMARIN) 0.625 MG/GM vaginal cream 30 g 5     Sig: Insert  into the vagina 3 (Three) Times a Week.        Pharmacy where request should be sent: [address] - [phone]  - [phone] FX     Last office visit with prescribing clinician: 3/18/2024   Last telemedicine visit with prescribing clinician: Visit date not found   Next office visit with prescribing clinician: 8/19/2024     Additional details provided by patient: NEEDS TO WRITTEN FOR 30 DAY SUPPLY DUE TO COST    Does the patient have less than a 3 day supply:  [] Yes  [x] No    Would you like a call back once the refill request has been completed: [] Yes [x] No    If the office needs to give you a call back, can they leave a voicemail: [] Yes [x] No    Quan Negrete   06/14/24 13:06 EDT

## 2024-06-18 ENCOUNTER — TELEPHONE (OUTPATIENT)
Dept: FAMILY MEDICINE CLINIC | Facility: CLINIC | Age: 79
End: 2024-06-18

## 2024-06-18 DIAGNOSIS — N95.1 MENOPAUSAL SYMPTOM: ICD-10-CM

## 2024-06-18 NOTE — TELEPHONE ENCOUNTER
Caller: Tennille Gasca    Relationship: Self    Best call back number: [phone]    Which medication are you concerned about: Estrogens Conjugated (PREMARIN) 0.625 MG/GM vaginal cream     Who prescribed you this medication: DR. MENDOZA      What are your concerns: PHARMACIST STATES THIS NEEDS TO BE WRITTEN FOR 1 GRAM PER DAY FOR INSURANCE TO COVER IT.    How long have you had these concerns: TODAY TO SEE IF IT IS READY AND THE COST WAS GOING TO BE OVER $140. PER TUBE    [facility] [address] - [phone]  - [phone] FX

## 2024-06-18 NOTE — TELEPHONE ENCOUNTER
THE RX NEEDS TO HAVE AT LEAST 0.5 GRAMS A DAY FOR 21 DAYS THEN OFF FOR 7 OR 0.5 GRAMS TWICE WEEKLY FOR THAT MENOPAUSE ICD 10 DX CODE.    ANYTHING GREATER THAN 0.5 GRAMS WOULD NEED A DIFFERENT DX CODE

## 2024-06-20 ENCOUNTER — TELEPHONE (OUTPATIENT)
Dept: FAMILY MEDICINE CLINIC | Facility: CLINIC | Age: 79
End: 2024-06-20

## 2024-06-20 NOTE — TELEPHONE ENCOUNTER
Caller: Tennille Gasca    Relationship: Self    Best call back number: [phone]     What is the best time to reach you: ANY    Who are you requesting to speak with (clinical staff, provider,  specific staff member): NURSE    Do you know the name of the person who called: PATIENT    What was the call regarding: PATIENT IS HAVING ISSUES WITH INSURANCE ON A MEDICATION.  WAS TOLD TO CALL PHARMACY AND ORDER PREMARIN 1 G PER DAY.      PHARMACY:  [facility]    Is it okay if the provider responds through MyChart: PHONE CALL PLEASE

## 2024-06-21 NOTE — TELEPHONE ENCOUNTER
Caller: Tennille Gasca    Relationship: Self    Best call back number: [phone]     What is the best time to reach you: ANY    Who are you requesting to speak with (clinical staff, provider,  specific staff member): CLINICAL STAFF    What was the call regarding: PATIENT STATES THAT PHARMACY IS STILL HAVING TROUBLE FILLING PRESCRIPTION AND ASKS FOR PROVIDER TO CALL PHARMACY.   PATIENT CALLED PHARMACY ON 6/21  PRESCRIPTION NEEDS TO SAY 1GRAM PER DAY.

## 2024-06-22 DIAGNOSIS — N95.2 ATROPHIC VAGINITIS: Primary | ICD-10-CM

## 2024-06-22 DIAGNOSIS — N95.1 MENOPAUSAL SYMPTOM: ICD-10-CM

## 2024-06-24 ENCOUNTER — TELEPHONE (OUTPATIENT)
Dept: FAMILY MEDICINE CLINIC | Facility: CLINIC | Age: 79
End: 2024-06-24
Payer: MEDICARE

## 2024-06-24 NOTE — TELEPHONE ENCOUNTER
----- Message from Alfonso Rubi sent at 6/22/2024  9:54 AM EDT -----  Emailed  Thanks again  ----- Message -----  From: Caroline Garcia MA  Sent: 6/21/2024   3:48 PM EDT  To: Alfonso Rubi MD    Pharmacy called again on the vaginal cream. They need it changed to once daily and they need the menopause diagnosis taken off the prescription.

## 2024-07-22 ENCOUNTER — LAB (OUTPATIENT)
Dept: FAMILY MEDICINE CLINIC | Facility: CLINIC | Age: 79
End: 2024-07-22
Payer: MEDICARE

## 2024-07-22 ENCOUNTER — TELEPHONE (OUTPATIENT)
Dept: FAMILY MEDICINE CLINIC | Facility: CLINIC | Age: 79
End: 2024-07-22

## 2024-07-22 DIAGNOSIS — R30.0 DYSURIA: Primary | ICD-10-CM

## 2024-07-22 PROCEDURE — 87086 URINE CULTURE/COLONY COUNT: CPT | Performed by: GENERAL PRACTICE

## 2024-07-22 PROCEDURE — 87186 SC STD MICRODIL/AGAR DIL: CPT | Performed by: GENERAL PRACTICE

## 2024-07-22 PROCEDURE — 87077 CULTURE AEROBIC IDENTIFY: CPT | Performed by: GENERAL PRACTICE

## 2024-07-22 NOTE — TELEPHONE ENCOUNTER
Caller: Tennille Gasca    Relationship: Self    Best call back number: [phone]    What medication are you requesting: SOMETHING FOR A UTI BUT NOT CEFDINIR.  THE PATIENT HAS A REACTION TO IT.  THE PATIENT IS IN PAIN. PLEASE CALL SOMETHING IN ASAP.     What are your current symptoms: BURNING WHEN SHE PEES. GOES TO THE BATHROOM EVERY 10 MIN.     How long have you been experiencing symptoms: YESTERDAY     Have you had these symptoms before:    [x] Yes  [] No    Have you been treated for these symptoms before:   [x] Yes  [] No    If a prescription is needed, what is your preferred pharmacy and phone number:    [facility]   Additional notes:

## 2024-07-23 ENCOUNTER — TELEPHONE (OUTPATIENT)
Dept: FAMILY MEDICINE CLINIC | Facility: CLINIC | Age: 79
End: 2024-07-23
Payer: MEDICARE

## 2024-07-23 RX ORDER — CIPROFLOXACIN 250 MG/1
250 TABLET, FILM COATED ORAL 2 TIMES DAILY
Qty: 14 TABLET | Refills: 0 | Status: SHIPPED | OUTPATIENT
Start: 2024-07-23 | End: 2024-07-30

## 2024-07-24 LAB — BACTERIA SPEC AEROBE CULT: ABNORMAL

## 2024-08-15 ENCOUNTER — TELEPHONE (OUTPATIENT)
Dept: FAMILY MEDICINE CLINIC | Facility: CLINIC | Age: 79
End: 2024-08-15
Payer: MEDICARE

## 2024-08-19 ENCOUNTER — OFFICE VISIT (OUTPATIENT)
Dept: FAMILY MEDICINE CLINIC | Facility: CLINIC | Age: 79
End: 2024-08-19
Payer: MEDICARE

## 2024-08-19 VITALS
HEIGHT: 66 IN | SYSTOLIC BLOOD PRESSURE: 132 MMHG | OXYGEN SATURATION: 100 % | HEART RATE: 98 BPM | DIASTOLIC BLOOD PRESSURE: 62 MMHG | WEIGHT: 223 LBS | RESPIRATION RATE: 14 BRPM | BODY MASS INDEX: 35.84 KG/M2 | TEMPERATURE: 98.7 F

## 2024-08-19 DIAGNOSIS — N95.2 ATROPHIC VAGINITIS: ICD-10-CM

## 2024-08-19 DIAGNOSIS — E66.01 CLASS 2 SEVERE OBESITY WITH SERIOUS COMORBIDITY AND BODY MASS INDEX (BMI) OF 37.0 TO 37.9 IN ADULT, UNSPECIFIED OBESITY TYPE: ICD-10-CM

## 2024-08-19 DIAGNOSIS — M25.561 CHRONIC PAIN OF BOTH KNEES: ICD-10-CM

## 2024-08-19 DIAGNOSIS — M81.0 AGE-RELATED OSTEOPOROSIS WITHOUT CURRENT PATHOLOGICAL FRACTURE: ICD-10-CM

## 2024-08-19 DIAGNOSIS — E55.9 VITAMIN D DEFICIENCY: ICD-10-CM

## 2024-08-19 DIAGNOSIS — E03.4 HYPOTHYROIDISM DUE TO ACQUIRED ATROPHY OF THYROID: ICD-10-CM

## 2024-08-19 DIAGNOSIS — F33.41 RECURRENT MAJOR DEPRESSIVE DISORDER, IN PARTIAL REMISSION: ICD-10-CM

## 2024-08-19 DIAGNOSIS — G89.29 CHRONIC PAIN OF BOTH KNEES: ICD-10-CM

## 2024-08-19 DIAGNOSIS — M85.80 OSTEOPENIA, UNSPECIFIED LOCATION: ICD-10-CM

## 2024-08-19 DIAGNOSIS — M25.562 CHRONIC PAIN OF BOTH KNEES: ICD-10-CM

## 2024-08-19 DIAGNOSIS — I47.10 PAROXYSMAL SVT (SUPRAVENTRICULAR TACHYCARDIA): Primary | ICD-10-CM

## 2024-08-19 DIAGNOSIS — I10 ESSENTIAL HYPERTENSION: ICD-10-CM

## 2024-08-19 DIAGNOSIS — Z12.31 ENCOUNTER FOR SCREENING MAMMOGRAM FOR BREAST CANCER: ICD-10-CM

## 2024-08-19 DIAGNOSIS — Z00.00 HEALTHCARE MAINTENANCE: ICD-10-CM

## 2024-08-19 DIAGNOSIS — Z85.828 HISTORY OF NONMELANOMA SKIN CANCER: ICD-10-CM

## 2024-08-19 DIAGNOSIS — N95.1 MENOPAUSAL SYMPTOM: ICD-10-CM

## 2024-08-19 DIAGNOSIS — E66.01 CLASS 2 SEVERE OBESITY WITH SERIOUS COMORBIDITY AND BODY MASS INDEX (BMI) OF 36.0 TO 36.9 IN ADULT, UNSPECIFIED OBESITY TYPE: ICD-10-CM

## 2024-08-19 PROBLEM — N39.0 RECURRENT URINARY TRACT INFECTION: Status: RESOLVED | Noted: 2023-09-18 | Resolved: 2024-08-19

## 2024-08-19 PROCEDURE — 84443 ASSAY THYROID STIM HORMONE: CPT | Performed by: GENERAL PRACTICE

## 2024-08-19 PROCEDURE — 82306 VITAMIN D 25 HYDROXY: CPT | Performed by: GENERAL PRACTICE

## 2024-08-19 PROCEDURE — 99214 OFFICE O/P EST MOD 30 MIN: CPT | Performed by: GENERAL PRACTICE

## 2024-08-19 PROCEDURE — 80053 COMPREHEN METABOLIC PANEL: CPT | Performed by: GENERAL PRACTICE

## 2024-08-19 PROCEDURE — G0439 PPPS, SUBSEQ VISIT: HCPCS | Performed by: GENERAL PRACTICE

## 2024-08-19 PROCEDURE — 3078F DIAST BP <80 MM HG: CPT | Performed by: GENERAL PRACTICE

## 2024-08-19 PROCEDURE — 3075F SYST BP GE 130 - 139MM HG: CPT | Performed by: GENERAL PRACTICE

## 2024-08-19 PROCEDURE — 80061 LIPID PANEL: CPT | Performed by: GENERAL PRACTICE

## 2024-08-19 PROCEDURE — 85025 COMPLETE CBC W/AUTO DIFF WBC: CPT | Performed by: GENERAL PRACTICE

## 2024-08-19 PROCEDURE — 1170F FXNL STATUS ASSESSED: CPT | Performed by: GENERAL PRACTICE

## 2024-08-19 RX ORDER — LOSARTAN POTASSIUM 50 MG/1
50 TABLET ORAL DAILY
Qty: 30 TABLET | Refills: 5 | Status: SHIPPED | OUTPATIENT
Start: 2024-08-19

## 2024-08-19 RX ORDER — LEVOTHYROXINE SODIUM 112 MCG
112 TABLET ORAL DAILY
Qty: 30 TABLET | Refills: 5 | Status: SHIPPED | OUTPATIENT
Start: 2024-08-19

## 2024-08-19 RX ORDER — PHENTERMINE HYDROCHLORIDE 37.5 MG/1
37.5 CAPSULE ORAL EVERY MORNING
Qty: 30 CAPSULE | Refills: 5 | Status: SHIPPED | OUTPATIENT
Start: 2024-08-19

## 2024-08-19 NOTE — ASSESSMENT & PLAN NOTE
Encouraged to continue to pursue weight bearing activities while exercising joint protection.  As above.

## 2024-08-19 NOTE — ASSESSMENT & PLAN NOTE
Hypertension: at goal. Evidence of target organ damage: none.  Encouraged to continue to work on diet and exercise plan.   Continue current medication

## 2024-08-19 NOTE — PROGRESS NOTES
Subjective   The ABCs of the Annual Wellness Visit  Medicare Wellness Visit    Tennille Gasca is a 79 y.o. patient who presents for a Medicare Wellness Visit.    The following portions of the patient's history were reviewed and   updated as appropriate: allergies, current medications, past family history, past medical history, past social history, past surgical history, and problem list.    Compared to one year ago, the patient's physical   health is the same.  Compared to one year ago, the patient's mental   health is the same.    Recent Hospitalizations:  She was not admitted to the hospital during the last year.     Current Medical Providers:  Patient Care Team:  Alfonso Rubi MD as PCP - General  Alfonso Rubi MD as PCP - Family Medicine    Outpatient Medications Prior to Visit   Medication Sig Dispense Refill    cholecalciferol (VITAMIN D3) 25 MCG (1000 UT) tablet Take 1 tablet by mouth Daily. 30 tablet 5    coenzyme Q10 100 MG capsule Take 1 capsule by mouth Daily.      vitamin B-12 (CYANOCOBALAMIN) 500 MCG tablet Take 1 tablet by mouth Daily. 30 tablet 5    cefdinir (OMNICEF) 300 MG capsule Take 1 capsule by mouth 2 (Two) Times a Day. 14 capsule 0    Diclofenac Sodium (VOLTAREN) 1 % gel gel Apply 4 g topically to the appropriate area as directed 4 (Four) Times a Day As Needed (joint pain). 500 g 5    Estrogens Conjugated (PREMARIN) 0.625 MG/GM vaginal cream 1 g pv daily 30 g 5    losartan (COZAAR) 50 MG tablet Take 1 tablet by mouth Daily. 30 tablet 5    phentermine 37.5 MG capsule Take 1 capsule by mouth Every Morning. 30 capsule 5    Synthroid 112 MCG tablet Take 1 tablet by mouth Daily. 30 tablet 5     No facility-administered medications prior to visit.     No opioid medication identified on active medication list. I have reviewed chart for other potential  high risk medication/s and harmful drug interactions in the elderly.      Aspirin is not on active medication list.  Aspirin  "use is not indicated based on review of current medical condition/s. Risk of harm outweighs potential benefits.  .    Patient Active Problem List   Diagnosis    Osteopenia    Hypothyroidism due to acquired atrophy of thyroid    Recurrent major depressive disorder, in partial remission    Vitamin D deficiency    Class 2 severe obesity with serious comorbidity and body mass index (BMI) of 36.0 to 36.9 in adult    Menopausal symptom    Healthcare maintenance    Bilateral knee pain    Encounter for screening mammogram for breast cancer    Encounter for immunization    Asymptomatic cholelithiasis    Atrophic vaginitis    Essential hypertension    History of nonmelanoma skin cancer    Paroxysmal SVT (supraventricular tachycardia)     Advance Care Planning Advance Directive is not on file.  ACP discussion was held with the patient during this visit. Patient does not have an advance directive, information provided.      Objective   Vitals:    24 0956   BP: 132/62   Pulse: 98   Resp: 14   Temp: 98.7 °F (37.1 °C)   TempSrc: Temporal   SpO2: 100%   Weight: 101 kg (223 lb)   Height: 167.6 cm (65.98\")     Estimated body mass index is 36.01 kg/m² as calculated from the following:    Height as of this encounter: 167.6 cm (65.98\").    Weight as of this encounter: 101 kg (223 lb).     Does the patient have evidence of cognitive impairment? No                                                                                                Health  Risk Assessment    Smoking Status:  Social History     Tobacco Use   Smoking Status Never    Passive exposure: Never   Smokeless Tobacco Never     Alcohol Consumption:  Social History     Substance and Sexual Activity   Alcohol Use No     Fall Risk Screen  STEADI Fall Risk Assessment was completed, and patient is at LOW risk for falls.Assessment completed on:2024    Depression Screenin/19/2024     9:56 AM   PHQ-2/PHQ-9 Depression Screening   Little Interest or Pleasure in " Doing Things 0-->not at all   Feeling Down, Depressed or Hopeless 0-->not at all   PHQ-9: Brief Depression Severity Measure Score 0     Health Habits and Functional and Cognitive Screenin/19/2024     9:56 AM   Functional & Cognitive Status   Do you have difficulty preparing food and eating? No   Do you have difficulty bathing yourself, getting dressed or grooming yourself? No   Do you have difficulty using the toilet? No   Do you have difficulty moving around from place to place? No   Current Diet Well Balanced Diet   Dental Exam Not up to date   Eye Exam Not up to date   Exercise (times per week) 7 times per week   Current Exercises Include Walking;House Cleaning   Do you need help using the phone?  No   Are you deaf or do you have serious difficulty hearing?  No   Do you need help to go to places out of walking distance? No   Do you need help shopping? No   Do you need help preparing meals?  No   Do you need help with housework?  No   Do you need help with laundry? No   Do you need help taking your medications? No   Do you need help managing money? No   Do you ever drive or ride in a car without wearing a seat belt? No   Have you felt unusual stress, anger or loneliness in the last month? No   Who do you live with? Spouse   If you need help, do you have trouble finding someone available to you? No   Have you been bothered in the last four weeks by sexual problems? No   Do you have difficulty concentrating, remembering or making decisions? No           Age-appropriate Screening Schedule:  Refer to the list below for future screening recommendations based on patient's age, sex and/or medical conditions. Orders for these recommended tests are listed in the plan section. The patient has been provided with a written plan.    Health Maintenance List  Health Maintenance   Topic Date Due    RSV Vaccine - Adults (1 - 1-dose 60+ series) Never done    ZOSTER VACCINE (2 of 3) 03/10/2010    DXA SCAN  2023     COVID-19 Vaccine (5 - 2023-24 season) 09/01/2023    INFLUENZA VACCINE  08/01/2024    ANNUAL WELLNESS VISIT  08/19/2025    BMI FOLLOWUP  08/19/2025    COLORECTAL CANCER SCREENING  06/20/2029    TDAP/TD VACCINES (3 - Td or Tdap) 07/21/2032    HEPATITIS C SCREENING  Completed    Pneumococcal Vaccine 65+  Completed                                                                                                                                              CMS Preventative Services Quick Reference  Risk Factors Identified During Encounter  Chronic Pain: Natural history and expected course discussed. Questions answered.  Depression/Dysphoria:  Doing well at present. Encouraged to report if this should change  Immunizations Discussed/Encouraged: Influenza, Shingrix, and COVID19    The above risks/problems have been discussed with the patient.  Pertinent information has been shared with the patient in the After Visit Summary.  An After Visit Summary and PPPS were made available to the patient.    Follow Up:   Next Medicare Wellness visit to be scheduled in 1 year.     Additional E&M Note during same encounter follows:  Patient has additional, significant, and separately identifiable condition(s)/problem(s) that require work above and beyond the Medicare Wellness Visit     Chief Complaint  She returns for a scheduled reassessment of multiple medical problems including recurrent urinary tract infections, essential hypertension, hypothyroidism, chronic knee pain, and depression    Subjective   HPI  Tennille is also being seen today for additional medical problem/s.    Recurrent Urinary Tract Infections  She has been treated for several further UTIs since last here. She denies any frequency, nocturia, urgency, hematuria, vaginal discharge or bleeding, and there is no history of any fever, chills, or night sweats.    Essential Hypertension  She admits to stable shortness of breath on exertion.  She continues to deny any orthopnea or  PND and there is no history of any chest pain, palpitations lightheadedness, or swelling of the ankles.  She remains on losartan 50 nightly with no apparent side effects.  48-hour Holter monitoring completed on 3/1/2023 revealed 2 short 8-14 beat runs of SVT (possible atrial flutter).  Echocardiogram performed on 3/19/2023 was reported as showing grade 1 diastolic dysfunction, sclerosis of the aortic valve with mild stenosis, mild MR and TR, but normal systolic function with an estimated EF of 61 to 65%.  Stress testing performed the same day revealed no evidence of inducible ischemia    Hypothyroidism  She admits to fatigue, and struggles with her weight. Her hypothyroidism is due to Hashimoto's disease. She remains on levothyroxine 112 daily.  She has had no recent labs    Knee Pain  She continues to have intermittent knee pain as previously described. There has been no change in the quality nor any new associated symptoms.  There is no history of any giving way or locking. The pain is aggravated by prolonged weightbearing and with ladders. Evaluation to date: none. Treatment to date: She is using topical diclofenac with a significant improvement and no apparent side effects    Depression  She has a long history of intermittent nervousness, and worrying.  There is no history of any depression at present and she continues to deny any anhedonia, difficulty concentrating, impaired memory,  or suicidal thoughts.  With melatonin she continues to average 7-8 hours of sleep nightly.    Previous Nonmelanoma Skin Cancer  She continues to deny any new skin lesions    Review of Systems   Constitutional:  Positive for fatigue. Negative for appetite change, chills, diaphoresis and fever.   HENT:  Negative for congestion, ear pain, postnasal drip, rhinorrhea, sinus pressure, sneezing, sore throat, tinnitus and voice change.    Eyes:  Negative for visual disturbance.   Respiratory:  Negative for cough, shortness of breath and  "wheezing.    Cardiovascular:  Negative for chest pain, palpitations and leg swelling.   Gastrointestinal:  Negative for abdominal pain, blood in stool, constipation, diarrhea, nausea and vomiting.   Endocrine: Negative for polydipsia and polyuria.   Genitourinary:  Negative for dysuria, frequency, hematuria, urgency, vaginal bleeding and vaginal discharge.   Musculoskeletal:  Positive for arthralgias. Negative for back pain, joint swelling and myalgias.   Skin:  Negative for rash.   Neurological:  Negative for weakness, numbness and confusion.   Psychiatric/Behavioral:  Negative for decreased concentration, dysphoric mood, sleep disturbance and suicidal ideas. The patient is nervous/anxious.         Objective   Vital Signs:  /62   Pulse 98   Temp 98.7 °F (37.1 °C) (Temporal)   Resp 14   Ht 167.6 cm (65.98\")   Wt 101 kg (223 lb)   SpO2 100%   BMI 36.01 kg/m²     Physical Exam  Constitutional:       General: She is not in acute distress.     Appearance: Normal appearance. She is well-developed. She is not diaphoretic.      Comments: Bright and in good spirits. No apparent distress. No pallor, jaundice, diaphoresis, or cyanosis.   HENT:      Head: Atraumatic.      Right Ear: Tympanic membrane, ear canal and external ear normal.      Left Ear: Tympanic membrane, ear canal and external ear normal.      Mouth/Throat:      Lips: No lesions.      Mouth: Mucous membranes are moist. No oral lesions.      Pharynx: No oropharyngeal exudate or posterior oropharyngeal erythema.   Eyes:      General: Lids are normal.      Extraocular Movements: Extraocular movements intact.      Conjunctiva/sclera: Conjunctivae normal.      Pupils: Pupils are equal.   Neck:      Thyroid: No thyroid mass or thyromegaly.      Vascular: No carotid bruit or JVD.      Trachea: Trachea normal. No tracheal deviation.   Cardiovascular:      Rate and Rhythm: Normal rate and regular rhythm.      Heart sounds: Normal heart sounds, S1 normal and " S2 normal. No murmur heard.     No gallop.   Pulmonary:      Effort: Pulmonary effort is normal.      Breath sounds: Normal breath sounds.   Abdominal:      General: Bowel sounds are normal. There is no distension or abdominal bruit.      Palpations: Abdomen is soft. There is no hepatomegaly, splenomegaly or mass.      Tenderness: There is no abdominal tenderness. There is no right CVA tenderness or left CVA tenderness.      Hernia: No hernia is present.   Musculoskeletal:      Right lower leg: No edema.      Left lower leg: No edema.   Lymphadenopathy:      Head:      Right side of head: No submental, submandibular, tonsillar, preauricular, posterior auricular or occipital adenopathy.      Left side of head: No submental, submandibular, tonsillar, preauricular, posterior auricular or occipital adenopathy.      Cervical: No cervical adenopathy.      Upper Body:      Right upper body: No supraclavicular adenopathy.      Left upper body: No supraclavicular adenopathy.   Skin:     General: Skin is warm.      Coloration: Skin is not cyanotic, jaundiced or pale.      Findings: No rash.      Nails: There is no clubbing.   Neurological:      Mental Status: She is alert and oriented to person, place, and time.      Cranial Nerves: No cranial nerve deficit, dysarthria or facial asymmetry.      Sensory: No sensory deficit.      Motor: No tremor.      Coordination: Coordination normal.      Gait: Gait normal.   Psychiatric:         Attention and Perception: Attention normal.         Mood and Affect: Mood normal.         Speech: Speech normal.         Behavior: Behavior normal.         Thought Content: Thought content normal.           Assessment and Plan Additional age appropriate preventative wellness advice topics were discussed during today's preventative wellness exam(some topics already addressed during AWV portion of the note above):    Physical Activity: Advised cardiovascular activity 150 minutes per week as tolerated.  (example brisk walk for 30 minutes, 5 days a week).     Nutrition: Discussed nutrition plan with patient. Information shared in after visit summary. Goal is for a well balanced diet to enhance overall health.     Healthy Weight: Discussed current and goal BMI with patient. Steps to attain this goal discussed. Information shared in after visit summary.       Paroxysmal SVT (supraventricular tachycardia)  Will continue to monitor  Essential hypertension   Hypertension: at goal. Evidence of target organ damage: none.  Encouraged to continue to work on diet and exercise plan.   Continue current medication  Vitamin D deficiency  Continue supplementation with monitoring.  Hypothyroidism due to acquired atrophy of thyroid  Clinically euthyroid.  Continue current medication.  Updated labs drawn.  Class 2 severe obesity with serious comorbidity and body mass index (BMI) of 36.0 to 36.9 in adult, unspecified obesity type  As above.   Continue current medication.  Menopausal symptom    Healthcare maintenance  Recommended RSV, COVID and influenza vaccinations this fall.  Reminded that she is due for Shingrix  Will arrange an updated mammogram and DEXA scan  History of nonmelanoma skin cancer    Recurrent major depressive disorder, in partial remission  Significant situational component.   Supportive therapy.   Osteopenia, unspecified location  Encouraged to continue to pursue weight bearing activities while exercising joint protection.  As above.  Chronic pain of both knees  Reminded regarding symptomatic treatment.   Continue current medication  Encouraged to report if any worse or if any new symptoms or concerns.    Orders Placed This Encounter   Procedures    Mammo Screening Digital Tomosynthesis Bilateral With CAD     Standing Status:   Future     Standing Expiration Date:   8/19/2025     Scheduling Instructions:      Please schedule for early one afternoon in September 2024     Order Specific Question:   Reason for Exam:      Answer:   screening     Order Specific Question:   Release to patient     Answer:   Routine Release []    DEXA Bone Density Axial     Standing Status:   Future     Standing Expiration Date:   2025     Scheduling Instructions:      Please schedule for the same day as her mammogram     Order Specific Question:   Release to patient     Answer:   Routine Release [[id number]]     Order Specific Question:   Reason for Exam:     Answer:   F/U osteopenia     Order Specific Question:   Does this patient have a diabetic monitoring/medication delivering device on?     Answer:   No     Order Specific Question:   Is patient taking or have taken long term Glucocorticoid (steroids)?     Answer:   No     Order Specific Question:   Does the patient have rheumatoid arthritis?     Answer:   No     Order Specific Question:   Does the patient have secondary osteoporosis?     Answer:   No    Comprehensive Metabolic Panel     Order Specific Question:   Release to patient     Answer:   Routine Release []    Lipid Panel     Order Specific Question:   Release to patient     Answer:   Routine Release [[id number]]    TSH     Order Specific Question:   Release to patient     Answer:   Routine Release []    Vitamin D,25-Hydroxy     Order Specific Question:   Release to patient     Answer:   Routine Release [[id number]]    CBC Auto Differential     Order Specific Question:   Release to patient     Answer:   Routine Release [[id number]]    CBC & Differential     Order Specific Question:   Manual Differential     Answer:   No     Order Specific Question:   Release to patient     Answer:   Routine Release [[id number]]     New Medications Ordered This Visit   Medications    Estrogens Conjugated (PREMARIN) 0.625 MG/GM vaginal cream     Si g pv daily     Dispense:  30 g     Refill:  5    Diclofenac Sodium (VOLTAREN) 1 % gel gel     Sig: Apply 4 g topically to the appropriate area as directed 4 (Four) Times a  Day As Needed (joint pain).     Dispense:  500 g     Refill:  5    losartan (COZAAR) 50 MG tablet     Sig: Take 1 tablet by mouth Daily.     Dispense:  30 tablet     Refill:  5    Synthroid 112 MCG tablet     Sig: Take 1 tablet by mouth Daily.     Dispense:  30 tablet     Refill:  5     Brand name medically necessary    phentermine 37.5 MG capsule     Sig: Take 1 capsule by mouth Every Morning.     Dispense:  30 capsule     Refill:  5     Can substitute tablets in place of capsules as needed          Follow Up   Return in about 26 weeks (around 2/17/2025).  Patient was given instructions and counseling regarding her condition or for health maintenance advice. Please see specific information pulled into the AVS if appropriate.

## 2024-08-19 NOTE — ASSESSMENT & PLAN NOTE
Reminded regarding symptomatic treatment.   Continue current medication  Encouraged to report if any worse or if any new symptoms or concerns.

## 2024-08-19 NOTE — ASSESSMENT & PLAN NOTE
Recommended RSV, COVID and influenza vaccinations this fall.  Reminded that she is due for Shingrix  Will arrange an updated mammogram and DEXA scan

## 2024-08-20 DIAGNOSIS — Z11.59 ENCOUNTER FOR SCREENING FOR OTHER VIRAL DISEASES: ICD-10-CM

## 2024-08-20 DIAGNOSIS — N18.31 CHRONIC RENAL FAILURE, STAGE 3A: Primary | ICD-10-CM

## 2024-08-20 LAB
25(OH)D3 SERPL-MCNC: 60.5 NG/ML (ref 30–100)
ALBUMIN SERPL-MCNC: 4.3 G/DL (ref 3.5–5.2)
ALBUMIN/GLOB SERPL: 1.3 G/DL
ALP SERPL-CCNC: 100 U/L (ref 39–117)
ALT SERPL W P-5'-P-CCNC: 14 U/L (ref 1–33)
ANION GAP SERPL CALCULATED.3IONS-SCNC: 10.5 MMOL/L (ref 5–15)
AST SERPL-CCNC: 22 U/L (ref 1–32)
BASOPHILS # BLD AUTO: 0.12 10*3/MM3 (ref 0–0.2)
BASOPHILS NFR BLD AUTO: 1.7 % (ref 0–1.5)
BILIRUB SERPL-MCNC: 0.3 MG/DL (ref 0–1.2)
BUN SERPL-MCNC: 16 MG/DL (ref 8–23)
BUN/CREAT SERPL: 13.2 (ref 7–25)
CALCIUM SPEC-SCNC: 10 MG/DL (ref 8.6–10.5)
CHLORIDE SERPL-SCNC: 103 MMOL/L (ref 98–107)
CHOLEST SERPL-MCNC: 189 MG/DL (ref 0–200)
CO2 SERPL-SCNC: 22.5 MMOL/L (ref 22–29)
CREAT SERPL-MCNC: 1.21 MG/DL (ref 0.57–1)
DEPRECATED RDW RBC AUTO: 42.2 FL (ref 37–54)
EGFRCR SERPLBLD CKD-EPI 2021: 46.3 ML/MIN/1.73
EOSINOPHIL # BLD AUTO: 0.22 10*3/MM3 (ref 0–0.4)
EOSINOPHIL NFR BLD AUTO: 3.1 % (ref 0.3–6.2)
ERYTHROCYTE [DISTWIDTH] IN BLOOD BY AUTOMATED COUNT: 13.1 % (ref 12.3–15.4)
GLOBULIN UR ELPH-MCNC: 3.2 GM/DL
GLUCOSE SERPL-MCNC: 90 MG/DL (ref 65–99)
HCT VFR BLD AUTO: 37.5 % (ref 34–46.6)
HDLC SERPL-MCNC: 53 MG/DL (ref 40–60)
HGB BLD-MCNC: 13 G/DL (ref 12–15.9)
IMM GRANULOCYTES # BLD AUTO: 0.03 10*3/MM3 (ref 0–0.05)
IMM GRANULOCYTES NFR BLD AUTO: 0.4 % (ref 0–0.5)
LDLC SERPL CALC-MCNC: 123 MG/DL (ref 0–100)
LDLC/HDLC SERPL: 2.3 {RATIO}
LYMPHOCYTES # BLD AUTO: 1.32 10*3/MM3 (ref 0.7–3.1)
LYMPHOCYTES NFR BLD AUTO: 18.4 % (ref 19.6–45.3)
MCH RBC QN AUTO: 31 PG (ref 26.6–33)
MCHC RBC AUTO-ENTMCNC: 34.7 G/DL (ref 31.5–35.7)
MCV RBC AUTO: 89.3 FL (ref 79–97)
MONOCYTES # BLD AUTO: 0.52 10*3/MM3 (ref 0.1–0.9)
MONOCYTES NFR BLD AUTO: 7.3 % (ref 5–12)
NEUTROPHILS NFR BLD AUTO: 4.96 10*3/MM3 (ref 1.7–7)
NEUTROPHILS NFR BLD AUTO: 69.1 % (ref 42.7–76)
NRBC BLD AUTO-RTO: 0 /100 WBC (ref 0–0.2)
PLATELET # BLD AUTO: 406 10*3/MM3 (ref 140–450)
PMV BLD AUTO: 11.4 FL (ref 6–12)
POTASSIUM SERPL-SCNC: 5.5 MMOL/L (ref 3.5–5.2)
PROT SERPL-MCNC: 7.5 G/DL (ref 6–8.5)
RBC # BLD AUTO: 4.2 10*6/MM3 (ref 3.77–5.28)
SODIUM SERPL-SCNC: 136 MMOL/L (ref 136–145)
TRIGL SERPL-MCNC: 70 MG/DL (ref 0–150)
TSH SERPL DL<=0.05 MIU/L-ACNC: 2.91 UIU/ML (ref 0.27–4.2)
VLDLC SERPL-MCNC: 13 MG/DL (ref 5–40)
WBC NRBC COR # BLD AUTO: 7.17 10*3/MM3 (ref 3.4–10.8)

## 2024-08-27 ENCOUNTER — LAB (OUTPATIENT)
Dept: FAMILY MEDICINE CLINIC | Facility: CLINIC | Age: 79
End: 2024-08-27
Payer: MEDICARE

## 2024-08-27 DIAGNOSIS — Z11.59 ENCOUNTER FOR SCREENING FOR OTHER VIRAL DISEASES: ICD-10-CM

## 2024-08-27 DIAGNOSIS — N18.31 CHRONIC RENAL FAILURE, STAGE 3A: ICD-10-CM

## 2024-08-27 PROCEDURE — 86706 HEP B SURFACE ANTIBODY: CPT | Performed by: GENERAL PRACTICE

## 2024-08-27 PROCEDURE — 82570 ASSAY OF URINE CREATININE: CPT | Performed by: GENERAL PRACTICE

## 2024-08-27 PROCEDURE — 85652 RBC SED RATE AUTOMATED: CPT | Performed by: GENERAL PRACTICE

## 2024-08-27 PROCEDURE — 86803 HEPATITIS C AB TEST: CPT | Performed by: GENERAL PRACTICE

## 2024-08-27 PROCEDURE — 87340 HEPATITIS B SURFACE AG IA: CPT | Performed by: GENERAL PRACTICE

## 2024-08-27 PROCEDURE — 84156 ASSAY OF PROTEIN URINE: CPT | Performed by: GENERAL PRACTICE

## 2024-08-28 LAB
CREAT UR-MCNC: 54.9 MG/DL
ERYTHROCYTE [SEDIMENTATION RATE] IN BLOOD: 28 MM/HR (ref 0–30)
HBV SURFACE AB SER RIA-ACNC: NORMAL
HBV SURFACE AG SERPL QL IA: NORMAL
HCV AB SER QL: NORMAL
PROT ?TM UR-MCNC: 6.8 MG/DL
PROT/CREAT UR: 123.9 MG/G CREA (ref 0–200)

## 2024-09-18 ENCOUNTER — HOSPITAL ENCOUNTER (OUTPATIENT)
Facility: HOSPITAL | Age: 79
Discharge: HOME | End: 2024-09-18
Payer: MEDICARE

## 2024-09-18 DIAGNOSIS — M81.0 AGE-RELATED OSTEOPOROSIS WITHOUT CURRENT PATHOLOGICAL FRACTURE: ICD-10-CM

## 2024-09-18 DIAGNOSIS — N18.31 CHRONIC RENAL FAILURE, STAGE 3A: ICD-10-CM

## 2024-09-18 DIAGNOSIS — N13.30 HYDRONEPHROSIS OF RIGHT KIDNEY: ICD-10-CM

## 2024-09-18 DIAGNOSIS — Z00.00 HEALTHCARE MAINTENANCE: ICD-10-CM

## 2024-09-18 DIAGNOSIS — N18.31 CHRONIC RENAL FAILURE, STAGE 3A: Primary | ICD-10-CM

## 2024-09-18 DIAGNOSIS — Z12.31 ENCOUNTER FOR SCREENING MAMMOGRAM FOR BREAST CANCER: ICD-10-CM

## 2024-09-18 DIAGNOSIS — M85.80 OSTEOPENIA, UNSPECIFIED LOCATION: ICD-10-CM

## 2024-09-18 PROCEDURE — 77063 BREAST TOMOSYNTHESIS BI: CPT

## 2024-09-18 PROCEDURE — 77067 SCR MAMMO BI INCL CAD: CPT

## 2024-09-18 PROCEDURE — 76700 US EXAM ABDOM COMPLETE: CPT

## 2024-09-18 PROCEDURE — 77080 DXA BONE DENSITY AXIAL: CPT

## 2024-09-23 ENCOUNTER — OFFICE VISIT (OUTPATIENT)
Dept: FAMILY MEDICINE CLINIC | Facility: CLINIC | Age: 79
End: 2024-09-23
Payer: MEDICARE

## 2024-09-23 VITALS
DIASTOLIC BLOOD PRESSURE: 70 MMHG | BODY MASS INDEX: 35.68 KG/M2 | HEIGHT: 66 IN | OXYGEN SATURATION: 99 % | TEMPERATURE: 98.6 F | SYSTOLIC BLOOD PRESSURE: 140 MMHG | HEART RATE: 95 BPM | RESPIRATION RATE: 14 BRPM | WEIGHT: 222 LBS

## 2024-09-23 DIAGNOSIS — M25.562 CHRONIC PAIN OF BOTH KNEES: Primary | ICD-10-CM

## 2024-09-23 DIAGNOSIS — E55.9 VITAMIN D DEFICIENCY: ICD-10-CM

## 2024-09-23 DIAGNOSIS — Z00.00 HEALTHCARE MAINTENANCE: ICD-10-CM

## 2024-09-23 DIAGNOSIS — N18.31 CHRONIC RENAL FAILURE, STAGE 3A: ICD-10-CM

## 2024-09-23 DIAGNOSIS — N13.30 HYDRONEPHROSIS OF RIGHT KIDNEY: ICD-10-CM

## 2024-09-23 DIAGNOSIS — E03.4 HYPOTHYROIDISM DUE TO ACQUIRED ATROPHY OF THYROID: ICD-10-CM

## 2024-09-23 DIAGNOSIS — M25.561 CHRONIC PAIN OF BOTH KNEES: Primary | ICD-10-CM

## 2024-09-23 DIAGNOSIS — I47.10 PAROXYSMAL SVT (SUPRAVENTRICULAR TACHYCARDIA): ICD-10-CM

## 2024-09-23 DIAGNOSIS — G89.29 CHRONIC PAIN OF BOTH KNEES: Primary | ICD-10-CM

## 2024-09-23 DIAGNOSIS — M81.0 AGE-RELATED OSTEOPOROSIS WITHOUT CURRENT PATHOLOGICAL FRACTURE: ICD-10-CM

## 2024-09-23 DIAGNOSIS — I10 ESSENTIAL HYPERTENSION: ICD-10-CM

## 2024-09-23 DIAGNOSIS — E66.01 CLASS 2 SEVERE OBESITY WITH SERIOUS COMORBIDITY AND BODY MASS INDEX (BMI) OF 36.0 TO 36.9 IN ADULT, UNSPECIFIED OBESITY TYPE: ICD-10-CM

## 2024-09-23 DIAGNOSIS — F33.41 RECURRENT MAJOR DEPRESSIVE DISORDER, IN PARTIAL REMISSION: ICD-10-CM

## 2024-09-23 PROCEDURE — G2211 COMPLEX E/M VISIT ADD ON: HCPCS | Performed by: GENERAL PRACTICE

## 2024-09-23 PROCEDURE — 3078F DIAST BP <80 MM HG: CPT | Performed by: GENERAL PRACTICE

## 2024-09-23 PROCEDURE — 3077F SYST BP >= 140 MM HG: CPT | Performed by: GENERAL PRACTICE

## 2024-09-23 PROCEDURE — 99214 OFFICE O/P EST MOD 30 MIN: CPT | Performed by: GENERAL PRACTICE

## 2024-09-30 ENCOUNTER — TRANSCRIBE ORDERS (OUTPATIENT)
Dept: FAMILY MEDICINE CLINIC | Facility: CLINIC | Age: 79
End: 2024-09-30
Payer: MEDICARE

## 2024-09-30 ENCOUNTER — OFFICE VISIT (OUTPATIENT)
Dept: UROLOGY | Facility: CLINIC | Age: 79
End: 2024-09-30
Payer: MEDICARE

## 2024-09-30 VITALS
BODY MASS INDEX: 35.52 KG/M2 | HEART RATE: 97 BPM | SYSTOLIC BLOOD PRESSURE: 195 MMHG | HEIGHT: 66 IN | WEIGHT: 221 LBS | DIASTOLIC BLOOD PRESSURE: 98 MMHG

## 2024-09-30 DIAGNOSIS — Z87.440 HISTORY OF RECURRENT UTIS: ICD-10-CM

## 2024-09-30 DIAGNOSIS — N39.0 URINARY TRACT INFECTION WITHOUT HEMATURIA, SITE UNSPECIFIED: Primary | ICD-10-CM

## 2024-09-30 DIAGNOSIS — N18.31 STAGE 3A CHRONIC KIDNEY DISEASE: ICD-10-CM

## 2024-09-30 DIAGNOSIS — R10.9 BILATERAL FLANK PAIN: ICD-10-CM

## 2024-09-30 LAB
BILIRUB BLD-MCNC: NEGATIVE MG/DL
CLARITY, POC: CLEAR
COLOR UR: NORMAL
EXPIRATION DATE: NORMAL
GLUCOSE UR STRIP-MCNC: NEGATIVE MG/DL
KETONES UR QL: NEGATIVE
LEUKOCYTE EST, POC: NEGATIVE
Lab: NORMAL
NITRITE UR-MCNC: NEGATIVE MG/ML
PH UR: 6 [PH] (ref 5–8)
PROT UR STRIP-MCNC: NEGATIVE MG/DL
RBC # UR STRIP: NEGATIVE /UL
SP GR UR: 1 (ref 1–1.03)
UROBILINOGEN UR QL: NORMAL

## 2024-09-30 PROCEDURE — 3080F DIAST BP >= 90 MM HG: CPT | Performed by: NURSE PRACTITIONER

## 2024-09-30 PROCEDURE — 3077F SYST BP >= 140 MM HG: CPT | Performed by: NURSE PRACTITIONER

## 2024-09-30 PROCEDURE — 99214 OFFICE O/P EST MOD 30 MIN: CPT | Performed by: NURSE PRACTITIONER

## 2024-09-30 PROCEDURE — 81003 URINALYSIS AUTO W/O SCOPE: CPT | Performed by: NURSE PRACTITIONER

## 2024-09-30 PROCEDURE — 1160F RVW MEDS BY RX/DR IN RCRD: CPT | Performed by: NURSE PRACTITIONER

## 2024-09-30 PROCEDURE — 1159F MED LIST DOCD IN RCRD: CPT | Performed by: NURSE PRACTITIONER

## 2024-09-30 PROCEDURE — 87086 URINE CULTURE/COLONY COUNT: CPT | Performed by: NURSE PRACTITIONER

## 2024-09-30 NOTE — PROGRESS NOTES
Chief Complaint  Hydronephrosis  (NEW PATIENT WITH CKD-3, LEFT HYDRONEPHROSIS/UTI/OAB)    Subjective          Tennille Gasca presents to [facility] for  CKD-3, LEFT HYDRONEPHROSIS/UTI/OAB)    History of Present Illness   History of Present Illness  The patient is a pleasant 79-year-old female who presents to the clinic for evaluation as a new patient consult. She has been referred by her primary care physician, Dr. Ludwig Tabares, due to concerns of stage 3 chronic kidney disease and mild left hydronephrosis detected on a renal ultrasound conducted on 09/18/2024.  We reviewed her recent CMP completed 08/19/2024 with creatinine of 1.21, BUN of 16, with an EGFR of 46.3.  Patient's EGFR was 50 9/9/2023, he was 59.9 in 2022    On clinic evaluation, she also expressed concerns about recurrent urinary tract infections (UTIs) I have been ongoing and recently becoming very bothersome to her, and potential bladder prolapse. Over the past 7 to 8 months, she has experienced recurrent bladder infections occurring monthly, with a brief respite before the next one. Symptoms include a burning sensation, pressure, frequent urination, and incontinence, for which she uses Depends for protection. Without medication, she experiences urgency. The most recent infection was particularly painful. She reports no presence of blood in her urine.     This morning, she experienced pain, which she attributes to stress from visiting the doctor.  Patient is systolic blood pressure is greater than 189/100    She has a history of constipation alternating with loose stools and suspects that E. coli may be leaking during these episodes. She maintains good hygiene, using Theraworx wipes after using tissue paper. She does not use any stool softeners or laxatives and typically has one bowel movement per day. Changing her cereal has improved her condition. She has tried various antibiotics, with the most  recent one being effective, though she had difficulty tolerating the previous one, which was prescribed twice. Since May 2024, she has had 6 or 7 infections, each treated with antibiotics. She also has issues with yeast infections and takes probiotics. She is not diabetic and has never had a kidney stone. She did not have many infections as a child.    Occasionally, she experiences back pain on the left side, but it is not constant. She does not take diuretics. Dr. Hurley in [place] advised her to use Estrace cream twice a week for atrophic vaginitis, and also to prevent bladder prolapse. She does not feel any bulge and does not strain to urinate.    Her medical history includes recurrent UTIs, essential hypertension, hypothyroidism, anxiety and depression, previous nonmelanoma skin cancer, and atrophic vaginitis, for which she is currently using estrogen Premarin cream. She takes vitamin D3, vitamin B12, a multivitamin, and CoQ10. She does not take cold medicines or antihistamines and consumes a high-protein diet. She is on losartan for her blood pressure.    FAMILY HISTORY  She denies any family history of kidney problems.    ALLERGIES  She is allergic to MACROBID and SULFA.    FINDINGS:US Abdomen Complete  09/18/24  IMPRESSION:  1.  Mild right hydronephrosis. No shadowing stone identified.  2.  Cholelithiasis.  3. Otherwise unremarkable exam.     Active Ambulatory Problems     Diagnosis Date Noted    Age-related osteoporosis without current pathological fracture     Hypothyroidism due to acquired atrophy of thyroid     Recurrent major depressive disorder, in partial remission     Vitamin D deficiency 09/26/2016    Class 2 severe obesity with serious comorbidity and body mass index (BMI) of 36.0 to 36.9 in adult 09/26/2016    Menopausal symptom 09/26/2016    Healthcare maintenance 09/26/2016    Bilateral knee pain 09/26/2016    Encounter for screening mammogram for breast cancer 03/28/2017    Encounter for  "immunization 11/27/2017    Asymptomatic cholelithiasis 03/25/2019    Atrophic vaginitis 03/26/2019    Essential hypertension 12/19/2019    History of nonmelanoma skin cancer 07/21/2022    Paroxysmal SVT (supraventricular tachycardia) 02/18/2023    Urinary tract infection without hematuria 09/18/2023    Stage 3a chronic kidney disease 08/20/2024    Hydronephrosis of right kidney 09/18/2024    Bilateral flank pain 09/30/2024    History of recurrent UTIs 09/30/2024     Resolved Ambulatory Problems     Diagnosis Date Noted    Dysuria 10/12/2017    H/O candidal vulvovaginitis 11/27/2017    Elevated blood-pressure reading without diagnosis of hypertension 11/27/2017    Viral gastroenteritis 03/25/2019    Asymptomatic bacteriuria 03/25/2019    Other fatigue 05/06/2019    Positive colorectal cancer screening using Cologuard test 06/17/2019    Grief reaction 03/22/2021     Past Medical History:   Diagnosis Date    Depression     Osteopenia       Objective   Vital Signs:   BP (!) 195/98 (BP Location: Right arm, Patient Position: Sitting)   Pulse 97   Ht 167.6 cm (65.98\")   Wt 100 kg (221 lb)   BMI 35.69 kg/m²       ROS:   Review of Systems   Constitutional:  Positive for activity change, appetite change, fatigue and unexpected weight gain. Negative for chills, diaphoresis, fever and unexpected weight loss.   HENT: Negative.  Negative for congestion, ear discharge, ear pain, nosebleeds, rhinorrhea, sinus pressure and sore throat.    Eyes: Negative.  Negative for blurred vision, double vision, photophobia, pain, redness and visual disturbance.   Respiratory:  Positive for shortness of breath. Negative for apnea, cough, chest tightness, wheezing and stridor.    Cardiovascular:  Negative for chest pain, palpitations and leg swelling.   Gastrointestinal:  Positive for abdominal distention, abdominal pain and constipation. Negative for diarrhea, nausea and vomiting.   Endocrine: Negative.  Negative for polydipsia, polyphagia " and polyuria.   Genitourinary:  Positive for decreased urine volume, dysuria, flank pain, frequency, pelvic pain, pelvic pressure, urgency and urinary incontinence. Negative for difficulty urinating, dyspareunia, genital sores, hematuria and vaginal discharge.   Musculoskeletal:  Positive for back pain and gait problem. Negative for arthralgias and joint swelling.   Skin:  Positive for color change, dry skin and pallor. Negative for rash and wound.   Allergic/Immunologic: Negative.    Neurological:  Negative for dizziness, tremors, syncope, weakness, light-headedness, headache, memory problem and confusion.   Hematological: Negative.    Psychiatric/Behavioral:  Positive for sleep disturbance and stress. Negative for behavioral problems and decreased concentration.         Physical Exam  Constitutional:       General: She is in acute distress.      Appearance: She is well-developed. She is obese. She is ill-appearing.   HENT:      Head: Normocephalic and atraumatic.   Eyes:      Pupils: Pupils are equal, round, and reactive to light.   Neck:      Thyroid: No thyromegaly.      Trachea: No tracheal deviation.   Cardiovascular:      Rate and Rhythm: Normal rate and regular rhythm.      Heart sounds: No murmur heard.  Pulmonary:      Effort: Pulmonary effort is normal. No respiratory distress.      Breath sounds: Normal breath sounds. No stridor. No wheezing.   Abdominal:      General: Bowel sounds are normal. There is distension.      Palpations: Abdomen is soft.      Tenderness: There is abdominal tenderness.   Genitourinary:     Labia:         Right: No tenderness.         Left: No tenderness.       Vagina: Normal. No vaginal discharge.      Comments: Urine frequency, urgency, dysuria  Musculoskeletal:         General: Tenderness present. No deformity. Normal range of motion.      Cervical back: Normal range of motion.   Skin:     General: Skin is warm and dry.      Capillary Refill: Capillary refill takes less than 2  seconds.      Coloration: Skin is not pale.      Findings: No erythema or rash.   Neurological:      Mental Status: She is alert and oriented to person, place, and time.      Cranial Nerves: No cranial nerve deficit.      Sensory: No sensory deficit.      Motor: Weakness present.      Coordination: Coordination normal.   Psychiatric:         Behavior: Behavior normal.         Thought Content: Thought content normal.         Judgment: Judgment normal.        Physical Exam  Vital Signs  Blood pressure reading is 195/98.  Result Review :     UA          3/18/2024    12:39 4/12/2024    11:17 9/30/2024    09:29   Urinalysis   Squamous Epithelial Cells, UA 0-2  0-2     Specific Gravity, UA 1.008  1.007     Ketones, UA Negative  Negative  Negative    Blood, UA Negative  Negative     Leukocytes, UA Moderate (2+)  Trace  Negative    Nitrite, UA Negative  Negative     RBC, UA 0-2  None Seen     WBC, UA 21-50  0-2     Bacteria, UA 1+  Trace       Urine Culture          10/16/2023    11:03 3/18/2024    12:39 7/22/2024    10:54   Urine Culture   Urine Culture 50,000 CFU/mL Mixed Shayy Isolated  >100,000 CFU/mL Klebsiella aerogenes     25,000 CFU/mL Enterococcus faecalis  50,000 CFU/mL Escherichia coli             Assessment and Plan    Problem List Items Addressed This Visit          Gastrointestinal Abdominal     Bilateral flank pain    Relevant Orders    CT Abdomen Without Contrast       Genitourinary and Reproductive     Urinary tract infection without hematuria - Primary    Relevant Orders    POC Urinalysis Dipstick, Automated (Completed)    Urine Culture - Urine, Urine, Clean Catch    CT Abdomen Without Contrast    Ambulatory Referral to Nephrology (Completed)    Stage 3a chronic kidney disease    Relevant Orders    CT Abdomen Without Contrast    Ambulatory Referral to Nephrology (Completed)    History of recurrent UTIs    Relevant Orders    CT Abdomen Without Contrast    Ambulatory Referral to Nephrology (Completed)      Assessment & Plan  1. Chronic Kidney Disease Stage III.  Her recent CMP on 08/19/2024 showed a creatinine of 1.21, BUN of 16, with an EGFR of 46.3, down from 53 three months prior. A CT scan of the abdomen and pelvis without contrast will be ordered to assess the severity of left hydronephrosis and potential blockage. Referral placed to nephrology for further evaluation. Foods to avoid with chronic kidney disease were discussed.    2. Mild Left Hydronephrosis.  An ultrasound on 09/18/2024 showed mild left hydronephrosis. A CT scan of the abdomen and pelvis without contrast will be ordered to determine the severity and cause. If the CT scan shows significant blockage, a retrograde pyelogram may be considered.    3. Recurrent Urinary Tract Infections.  She reports recurrent UTIs over the past 7-8 months, with symptoms including burning, pressure, and frequency. Urinalysis today was negative for leukocyte esterase, nitrite, and hematuria. Urine sent off for culture. Treatment for infections will be considered only if the culture is positive to avoid further kidney strain. She is advised to keep a list of antibiotics that have worked or caused issues in the past.    4. Atrophic Vaginitis.  She is currently using Premarin cream for atrophic vaginitis and is advised to continue its use. She reports no bulge or straining during urination, and no changes in bowel habits that would necessitate stopping the cream.      Recurrent urinary tract infections/OverActiveBladder: She reports doing relatively better on her antibiotic therapy and like to continue prophylaxis if indicated.  We discussed the types of organisms that are found in the urinary tract indicating that the vast majority are results of the patient's own gastrointestinal fifi.  We discussed how many of the antibiotics that are utilized can actually exacerbate these infections by creating resistant organisms and there is only a very few antibiotics that are  concentrated in the urine and do not affect the rectal reservoir nor cause recurrent yeast vaginitis.      We discussed the risk factors for recurrent infections being intercourse in younger patients and atrophic changes in older patients.  We discussed the symptoms that are found including pain, pressure, burning, frequency, urgency suprapubic pain and painful intercourse.  I discussed upper tract symptoms including fevers, chills, and indicated the workup would be much more aggressive if the patient were to present with recurrent infections in the face of upper tract symptomatology such as fever. I discussed the history of vesicoureteral reflux in young patients and finally chronic renal scarring as a result of such.     Right Flank Pain/hydronephrosis and hydroureter:  We both reviewed and discussed her renal ultrasound done last week which showed significant but mild right-sided hydronephrosis and hydroureter.  No distal ureteral stones as seen in the study.      She has chronic back pain,bilateral flank pain, she denies abdominal pain, pelvic pain or pressure.  He does not have CVA tenderness.  She denies having any more episodes of nausea, vomiting, or diarrhea. We discussed Hydronephrosis and hydroureter. I explained  Hydronephrosis as the swelling of a kidney due to a build-up of urine. It happens when urine cannot drain out from the kidney to the bladder from a blockage or obstruction. Hydronephrosis can occur in one or both kidneys.  Explained to patient that this can be caused by an anatomical defect that doesn't allow urine to drain properly, it could be a congenital blockage, a kidney stone, ETC. I explained hydroureter as a dilation of the ureter, and that both the presence of hydronephrosis or hydroureter can be physiologic or pathologic. It may be acute or chronic, unilateral or bilateral.    CKD-SATE 3-NEXT, WE DISCUSSED Causes of high creatinine levels, also the fact that High creatinine levels  usually indicate that the kidneys are not working as well as they should. Possible causes of this dysfunction include:a kidney infection, glomerulonephritis, which is inflammation of the kidney structures that filter the blood, kidney stones that block the urinary tract, kidney failure.    We also discussed the fact that, outside of kidney function, several other factors can temporarily raise creatinine levels above normal. These include dehydration and consuming large amounts of protein -- either through food sources or nutritional supplements. High intensity exercise can also increaseTrusted Source certain blood biomarkers, including creatinine. A person undergoing a creatinine test should let the doctor know if they are taking any medications, fasting, or adhering to a protein-rich diet.  Certain medicines, can cause a temporary increase in serum creatinine levels or damage the kidneys.Some health conditions can also damage these organs, leading to increased creatinine levels. These conditions include:diabetes, high blood pressure,  heart disease            PLAN  Referral placed to nephrology Dr. ST-chronic kidney disease with EGFR 46    We resent her urine for culture. I will call her with results if any bacteria growth.    Will start Macrobid 100 mg PO Daily -Suppressive Therapy if again positive urine culture.     She has been encouraged to ncrease her p.o. fluid intake to at least 1 to 2 L daily and avoid bladder irritants such as caffeine products, spicy foods, and citrusy foods.     I recommend concomitant probiotics with treatment with antibiotics to protect the rectal reservoir including over-the-counter yogurt preparations to jeronimo oral pills containing the appropriate probiotics. Patient reports the diligent use of Probiotics.    We discussed upper tract investigation, patient has been scheduled for CT renal mass protocol to evaluate degree of hydronephrosis.    We discussed lower tract investigation  via cystoscopy possible retrograde pyelogram procedure-deferred pending CT scan result    She will continue all other medications as prescribed    Follow-up in clinic in 2 weeks to review CT scan results and definitive plan of care    May return sooner if symptoms or worsen    Patient is agreeable to plan of care.     Follow-up  Return in 2 to 4 weeks for follow-up with CT abdomen and pelvis prior.    Patient reports that she is not currently experiencing any symptoms of urinary incontinence.    RADIOLOGY (CT AND/OR KUB):    CT Abdomen and Pelvis: No results found for this or any previous visit.     CT Stone Protocol: No results found for this or any previous visit.     KUB: No results found for this or any previous visit.       [unfilled]  LABS (3 MONTHS):    Office Visit on 09/30/2024   Component Date Value Ref Range Status    Color 09/30/2024 Dark Yellow  Yellow, Straw, Dark Yellow, Leslie Final    Clarity, UA 09/30/2024 Clear  Clear Final    Specific Gravity  09/30/2024 1.005  1.005 - 1.030 Final    pH, Urine 09/30/2024 6.0  5.0 - 8.0 Final    Leukocytes 09/30/2024 Negative  Negative Final    Nitrite, UA 09/30/2024 Negative  Negative Final    Protein, POC 09/30/2024 Negative  Negative mg/dL Final    Glucose, UA 09/30/2024 Negative  Negative mg/dL Final    Ketones, UA 09/30/2024 Negative  Negative Final    Urobilinogen, UA 09/30/2024 Normal  Normal, 0.2 E.U./dL Final    Bilirubin 09/30/2024 Negative  Negative Final    Blood, UA 09/30/2024 Negative  Negative Final    Lot Number 09/30/2024 N   Final    Expiration Date 09/30/2024 N   Final   Lab on 08/27/2024   Component Date Value Ref Range Status    Protein/Creatinine Ratio, Urine 08/27/2024 123.9  0.0 - 200.0 mg/G Crea Final    Creatinine, Urine 08/27/2024 54.9  mg/dL Final    Total Protein, Urine 08/27/2024 6.8  mg/dL Final    Hepatitis C Ab 08/27/2024 Non-Reactive  Non-Reactive Final    Hepatitis B Surface Ag 08/27/2024 Non-Reactive  Non-Reactive Final    Hep  B S Ab 08/27/2024 Non-Reactive   Final    Sed Rate 08/27/2024 28  0 - 30 mm/hr Final   Office Visit on 08/19/2024   Component Date Value Ref Range Status    Glucose 08/19/2024 90  65 - 99 mg/dL Final    BUN 08/19/2024 16  8 - 23 mg/dL Final    Creatinine 08/19/2024 1.21 (H)  0.57 - 1.00 mg/dL Final    Sodium 08/19/2024 136  136 - 145 mmol/L Final    Potassium 08/19/2024 5.5 (H)  3.5 - 5.2 mmol/L Final    Chloride 08/19/2024 103  98 - 107 mmol/L Final    CO2 08/19/2024 22.5  22.0 - 29.0 mmol/L Final    Calcium 08/19/2024 10.0  8.6 - 10.5 mg/dL Final    Total Protein 08/19/2024 7.5  6.0 - 8.5 g/dL Final    Albumin 08/19/2024 4.3  3.5 - 5.2 g/dL Final    ALT (SGPT) 08/19/2024 14  1 - 33 U/L Final    AST (SGOT) 08/19/2024 22  1 - 32 U/L Final    Alkaline Phosphatase 08/19/2024 100  39 - 117 U/L Final    Total Bilirubin 08/19/2024 0.3  0.0 - 1.2 mg/dL Final    Globulin 08/19/2024 3.2  gm/dL Final    A/G Ratio 08/19/2024 1.3  g/dL Final    BUN/Creatinine Ratio 08/19/2024 13.2  7.0 - 25.0 Final    Anion Gap 08/19/2024 10.5  5.0 - 15.0 mmol/L Final    eGFR 08/19/2024 46.3 (L)  >60.0 mL/min/1.73 Final    Total Cholesterol 08/19/2024 189  0 - 200 mg/dL Final    Triglycerides 08/19/2024 70  0 - 150 mg/dL Final    HDL Cholesterol 08/19/2024 53  40 - 60 mg/dL Final    LDL Cholesterol  08/19/2024 123 (H)  0 - 100 mg/dL Final    VLDL Cholesterol 08/19/2024 13  5 - 40 mg/dL Final    LDL/HDL Ratio 08/19/2024 2.30   Final    TSH 08/19/2024 2.910  0.270 - 4.200 uIU/mL Final    25 Hydroxy, Vitamin D 08/19/2024 60.5  30.0 - 100.0 ng/ml Final    WBC 08/19/2024 7.17  3.40 - 10.80 10*3/mm3 Final    RBC 08/19/2024 4.20  3.77 - 5.28 10*6/mm3 Final    Hemoglobin 08/19/2024 13.0  12.0 - 15.9 g/dL Final    Hematocrit 08/19/2024 37.5  34.0 - 46.6 % Final    MCV 08/19/2024 89.3  79.0 - 97.0 fL Final    MCH 08/19/2024 31.0  26.6 - 33.0 pg Final    MCHC 08/19/2024 34.7  31.5 - 35.7 g/dL Final    RDW 08/19/2024 13.1  12.3 - 15.4 % Final    RDW-SD  08/19/2024 42.2  37.0 - 54.0 fl Final    MPV 08/19/2024 11.4  6.0 - 12.0 fL Final    Platelets 08/19/2024 406  140 - 450 10*3/mm3 Final    Neutrophil % 08/19/2024 69.1  42.7 - 76.0 % Final    Lymphocyte % 08/19/2024 18.4 (L)  19.6 - 45.3 % Final    Monocyte % 08/19/2024 7.3  5.0 - 12.0 % Final    Eosinophil % 08/19/2024 3.1  0.3 - 6.2 % Final    Basophil % 08/19/2024 1.7 (H)  0.0 - 1.5 % Final    Immature Grans % 08/19/2024 0.4  0.0 - 0.5 % Final    Neutrophils, Absolute 08/19/2024 4.96  1.70 - 7.00 10*3/mm3 Final    Lymphocytes, Absolute 08/19/2024 1.32  0.70 - 3.10 10*3/mm3 Final    Monocytes, Absolute 08/19/2024 0.52  0.10 - 0.90 10*3/mm3 Final    Eosinophils, Absolute 08/19/2024 0.22  0.00 - 0.40 10*3/mm3 Final    Basophils, Absolute 08/19/2024 0.12  0.00 - 0.20 10*3/mm3 Final    Immature Grans, Absolute 08/19/2024 0.03  0.00 - 0.05 10*3/mm3 Final    nRBC 08/19/2024 0.0  0.0 - 0.2 /100 WBC Final   Lab on 07/22/2024   Component Date Value Ref Range Status    Urine Culture 07/22/2024 50,000 CFU/mL Escherichia coli (A)   Final      Follow Up   Return in about 3 weeks (around 10/21/2024) for Next scheduled follow up, RECURRENT UTI/DYSURIA/DETRUSSOR INSTAB/CKD/LEFT HYDRO , Review CT Scan.    Patient was given instructions and counseling regarding her condition or for health maintenance advice. Please see specific information pulled into the AVS if appropriate.          This document has been electronically signed by Griselda Cheng-Akwa, APRN   September 30, 2024 21:31 EDT      Dictated Utilizing Dragon Dictation: Part of this note may be an electronic transcription/translation of spoken language to printed text using the Dragon Dictation System.      Patient or patient representative verbalized consent for the use of Ambient Listening during the visit with  Griselda Cheng-Akwa, APRN for chart documentation. 9/30/2024  21:26 EDT

## 2024-10-01 LAB — BACTERIA SPEC AEROBE CULT: NO GROWTH

## 2024-10-02 ENCOUNTER — TELEPHONE (OUTPATIENT)
Dept: UROLOGY | Facility: CLINIC | Age: 79
End: 2024-10-02
Payer: MEDICARE

## 2024-10-02 NOTE — TELEPHONE ENCOUNTER
----- Message from Griselda Cheng-Akwa sent at 10/2/2024 12:10 PM EDT -----  Please let patient know her urine culture results are negative for any bacterial infection at this time.    Urine Culture - No growth    However she may drop off another urine dip if she feels symptomatic.    ONLY CONTINUE ABX SUPPRESSIVE THERAPY TAKING 1 P.O. NIGHTLY IF DISCUSSED    If not increase p.o. fluid intake and follow-up in clinic as discussed.    THANK YOU

## 2024-10-03 ENCOUNTER — PATIENT ROUNDING (BHMG ONLY) (OUTPATIENT)
Dept: UROLOGY | Facility: CLINIC | Age: 79
End: 2024-10-03
Payer: MEDICARE

## 2024-10-14 ENCOUNTER — HOSPITAL ENCOUNTER (OUTPATIENT)
Dept: CT IMAGING | Facility: HOSPITAL | Age: 79
Discharge: HOME | End: 2024-10-14
Admitting: GENERAL PRACTICE
Payer: MEDICARE

## 2024-10-14 ENCOUNTER — TELEPHONE (OUTPATIENT)
Dept: FAMILY MEDICINE CLINIC | Facility: CLINIC | Age: 79
End: 2024-10-14

## 2024-10-14 ENCOUNTER — LAB (OUTPATIENT)
Dept: FAMILY MEDICINE CLINIC | Facility: CLINIC | Age: 79
End: 2024-10-14
Payer: MEDICARE

## 2024-10-14 DIAGNOSIS — Z87.440 HISTORY OF RECURRENT UTIS: Primary | ICD-10-CM

## 2024-10-14 DIAGNOSIS — Z87.440 HISTORY OF RECURRENT UTIS: ICD-10-CM

## 2024-10-14 DIAGNOSIS — N13.30 HYDRONEPHROSIS OF RIGHT KIDNEY: ICD-10-CM

## 2024-10-14 PROCEDURE — 81001 URINALYSIS AUTO W/SCOPE: CPT | Performed by: GENERAL PRACTICE

## 2024-10-14 PROCEDURE — 87086 URINE CULTURE/COLONY COUNT: CPT | Performed by: GENERAL PRACTICE

## 2024-10-14 PROCEDURE — 87088 URINE BACTERIA CULTURE: CPT | Performed by: GENERAL PRACTICE

## 2024-10-14 PROCEDURE — 87186 SC STD MICRODIL/AGAR DIL: CPT

## 2024-10-14 PROCEDURE — 74150 CT ABDOMEN W/O CONTRAST: CPT

## 2024-10-14 RX ORDER — AMOXICILLIN AND CLAVULANATE POTASSIUM 500; 125 MG/1; MG/1
1 TABLET, FILM COATED ORAL 3 TIMES DAILY
Qty: 15 TABLET | Refills: 0 | Status: SHIPPED | OUTPATIENT
Start: 2024-10-14

## 2024-10-14 NOTE — TELEPHONE ENCOUNTER
Caller: Tennille Gasca    Relationship: Self    Best call back number: [phone]     What medication are you requesting: MEDICATION FOR A UTI INFECTION    What are your current symptoms: BURNING WHEN URINATING, URGENCY    How long have you been experiencing symptoms: LAST NIGHT        If a prescription is needed, what is your preferred pharmacy and phone number: [address] [place] [address] - [phone]  - [phone] FX     Additional notes:TESTED POSITIVE THIS MORNING FOR AN UTI

## 2024-10-14 NOTE — TELEPHONE ENCOUNTER
Caller: Tennille Gasca    Relationship: Self    Best call back number: [phone]     What medication are you requesting: MEDS FOR SYMPTOMS    What are your current symptoms: BURNING AND URINARY PRESSURE    How long have you been experiencing symptoms: 1 DAY    Have you had these symptoms before:    [x] Yes  [] No    Have you been treated for these symptoms before:   [x] Yes  [] No    If a prescription is needed, what is your preferred pharmacy and phone number: [address] [place] [address] - [phone]  - [phone] FX     Additional notes:

## 2024-10-15 LAB
BACTERIA UR QL AUTO: ABNORMAL /HPF
BILIRUB UR QL STRIP: NEGATIVE
CLARITY UR: ABNORMAL
COLOR UR: YELLOW
GLUCOSE UR STRIP-MCNC: NEGATIVE MG/DL
HGB UR QL STRIP.AUTO: ABNORMAL
HOLD SPECIMEN: NORMAL
HYALINE CASTS UR QL AUTO: ABNORMAL /LPF
KETONES UR QL STRIP: NEGATIVE
LEUKOCYTE ESTERASE UR QL STRIP.AUTO: ABNORMAL
NITRITE UR QL STRIP: NEGATIVE
PH UR STRIP.AUTO: 6 [PH] (ref 5–8)
PROT UR QL STRIP: ABNORMAL
RBC # UR STRIP: ABNORMAL /HPF
REF LAB TEST METHOD: ABNORMAL
SP GR UR STRIP: 1.01 (ref 1–1.03)
SQUAMOUS #/AREA URNS HPF: ABNORMAL /HPF
UROBILINOGEN UR QL STRIP: ABNORMAL
WBC # UR STRIP: ABNORMAL /HPF

## 2024-10-17 LAB — BACTERIA SPEC AEROBE CULT: ABNORMAL

## 2024-10-21 ENCOUNTER — OFFICE VISIT (OUTPATIENT)
Dept: UROLOGY | Facility: CLINIC | Age: 79
End: 2024-10-21
Payer: MEDICARE

## 2024-10-21 VITALS
WEIGHT: 219.4 LBS | BODY MASS INDEX: 35.26 KG/M2 | HEART RATE: 93 BPM | SYSTOLIC BLOOD PRESSURE: 161 MMHG | HEIGHT: 66 IN | DIASTOLIC BLOOD PRESSURE: 85 MMHG

## 2024-10-21 DIAGNOSIS — K59.04 CHRONIC IDIOPATHIC CONSTIPATION: ICD-10-CM

## 2024-10-21 DIAGNOSIS — N39.0 URINARY TRACT INFECTION WITHOUT HEMATURIA, SITE UNSPECIFIED: Primary | ICD-10-CM

## 2024-10-21 DIAGNOSIS — R10.30 LOWER ABDOMINAL PAIN: ICD-10-CM

## 2024-10-21 DIAGNOSIS — N95.2 ATROPHIC VAGINITIS: ICD-10-CM

## 2024-10-21 DIAGNOSIS — N30.00 ACUTE CYSTITIS WITHOUT HEMATURIA: ICD-10-CM

## 2024-10-21 LAB
BILIRUB BLD-MCNC: NEGATIVE MG/DL
CLARITY, POC: CLEAR
COLOR UR: YELLOW
EXPIRATION DATE: NORMAL
GLUCOSE UR STRIP-MCNC: NEGATIVE MG/DL
KETONES UR QL: NEGATIVE
LEUKOCYTE EST, POC: NEGATIVE
Lab: NORMAL
NITRITE UR-MCNC: NEGATIVE MG/ML
PH UR: 6 [PH] (ref 5–8)
PROT UR STRIP-MCNC: NEGATIVE MG/DL
RBC # UR STRIP: NEGATIVE /UL
SP GR UR: 1 (ref 1–1.03)
UROBILINOGEN UR QL: NORMAL

## 2024-10-21 PROCEDURE — 87086 URINE CULTURE/COLONY COUNT: CPT | Performed by: NURSE PRACTITIONER

## 2024-10-21 RX ORDER — AMOXICILLIN 250 MG
2 CAPSULE ORAL DAILY
Qty: 60 TABLET | Refills: 3 | Status: SHIPPED | OUTPATIENT
Start: 2024-10-21 | End: 2024-11-20

## 2024-10-21 NOTE — PROGRESS NOTES
Chief Complaint  Urinary tract infection without hematuria, site unspecified (ONE MONTH FOLLOW UP E'COLI UTI/ACUTE CYSTITIS)    Subjective          Tennille Gasca presents to [facility] for  CKD-3, LEFT HYDRONEPHROSIS/UTI/OAB/atrophic vaginitis    History of Present Illness   History of Present Illness  The patient is a pleasant 79-year-old female who presents to the clinic for evaluation .  This is a 1 month follow-up for acute cystitis without hematuria, recurrent UTIs, left hydronephrosis.  Patient returns to clinic today to review CT scan results which are unremarkable with improvement in her prior hydronephrosis.  On CT 10/14/2024, patient kidneys and ureters:  Unremarkable as visualized. No renal or ureteral stones. No significant hydronephrosis.    She was initially evaluated in clinic on 09/30/2024 as a new patient consult. She had been referred by her primary care physician, Dr. Ludwig Tabares, due to concerns of stage 3 chronic kidney disease and mild left hydronephrosis detected on a renal ultrasound conducted on 09/18/2024.  We reviewed her recent CMP completed 08/19/2024 with creatinine of 1.21, BUN of 16, with an EGFR of 46.3.  Patient's EGFR was 50 ON  9/9/2023, he was 59.9 in 2022    On LAST clinic evaluation, she also expressed concerns about recurrent urinary tract infections (UTIs) THAT  have been ongoing and recently becoming very bothersome to her, and potential bladder prolapse.  Patient reported that over the past 7 to 8 months, she has experienced recurrent bladder infections occurring monthly, with a brief respite before the next one. Symptoms include a burning sensation, pressure, frequent urination, and incontinence, for which she uses Depends for protection. Without medication, she experiences urgency. The most recent infection was particularly painful. She reports no presence of blood in her urine. Nevertheless her urine culture post clinic  evaluation was negative  Urine Culture 09/30/24 No growth        She was advised to drop off another urine dip in 2 weeks for recheck which was positive.  Patient was started on Augmentin twice daily x 7 days  Urine Culture 25,000 CFU/mL Escherichia coli Abnormal       This morning,10/21/24, she is post antibiotic therapy and denies any side effects from her medications.      Nevertheless, she experienced pain, which she attributes to stress from visiting the doctor.  Patient is systolic blood pressure is greater than 189/100 she reports her urinary symptoms have remained the same with frequency, urgency and intermittent bouts of dysuria.  She does have vaginal irritation and discomfort consistent with yeast vaginitis.  She has been on antibiotic therapy with amoxicillin for her prior E. coli UTI, upper respiratory infection she reports.  Nevertheless her urinalysis again is complete negative for leukocyte esterase, it is negative for nitrite, it is negative for gross/microscopic hematuria.  PVR 0 mL.  We discussed Diflucan 150 mg x 1 dose.  May repeat in 7 days if symptomatic.  Urine Culture 10/21/24 Yeast isolated Abnormal         Overall, patient reports that she has a history of constipation alternating with loose stools and suspects that E. coli may be leaking during these episodes. She maintains good hygiene, using Theraworx wipes after using tissue paper. She does not use any stool softeners or laxatives and typically has one bowel movement per day. Changing her cereal has improved her condition. She has tried various antibiotics, with the most recent one being effective, though she had difficulty tolerating the previous one, which was prescribed twice. Since May 2024, she has had 6 or 7 infections, each treated with antibiotics. She also has issues with yeast infections and takes probiotics. She is not diabetic and has never had a kidney stone. She did not have many infections as a child.    Occasionally,  she experiences back pain which she is chronically origin, more on the left side, but it is not constant. She does not take diuretics. Dr. Hurley in [place] advised her to use Estrace cream twice a week for atrophic vaginitis, and also to prevent bladder prolapse. She does not feel any bulge consistent with bladder prolapse and does not strain to urinate.    Her medical history includes recurrent UTIs, essential hypertension, hypothyroidism, anxiety and depression, previous nonmelanoma skin cancer, and atrophic vaginitis, for which she is currently using estrogen Premarin cream. She takes vitamin D3, vitamin B12, a multivitamin, and CoQ10. She does not take cold medicines or antihistamines and consumes a high-protein diet. She is on losartan for her blood pressure.    FAMILY HISTORY  She denies any family history of kidney problems.    ALLERGIES  She is allergic to MACROBID and SULFA.    IMPRESSION:CT Abdomen Without Contrast  10/14/24  1.  No renal or ureteral stones. No significant hydronephrosis.  2.  Large hiatal hernia.  3.  Simple appearing hepatic cysts.  4.  Cholelithiasis.  5.  Cardiomegaly and coronary artery calcifications.  6. Other nonacute findings above.    FINDINGS:US Abdomen Complete  09/18/24  IMPRESSION:  1.  Mild right hydronephrosis. No shadowing stone identified.  2.  Cholelithiasis.  3. Otherwise unremarkable exam.     Active Ambulatory Problems     Diagnosis Date Noted    Age-related osteoporosis without current pathological fracture     Hypothyroidism due to acquired atrophy of thyroid     Recurrent major depressive disorder, in partial remission     Vitamin D deficiency 09/26/2016    Class 2 severe obesity with serious comorbidity and body mass index (BMI) of 36.0 to 36.9 in adult 09/26/2016    Menopausal symptom 09/26/2016    Healthcare maintenance 09/26/2016    Bilateral knee pain 09/26/2016    Encounter for screening mammogram for breast cancer 03/28/2017    Encounter for immunization  "11/27/2017    Asymptomatic cholelithiasis 03/25/2019    Atrophic vaginitis 03/26/2019    Essential hypertension 12/19/2019    History of nonmelanoma skin cancer 07/21/2022    Paroxysmal SVT (supraventricular tachycardia) 02/18/2023    Urinary tract infection without hematuria 09/18/2023    Stage 3a chronic kidney disease 08/20/2024    Hydronephrosis of right kidney 09/18/2024    Bilateral flank pain 09/30/2024    History of recurrent UTIs 09/30/2024    Chronic idiopathic constipation 10/24/2024    Lower abdominal pain 10/24/2024     Resolved Ambulatory Problems     Diagnosis Date Noted    Dysuria 10/12/2017    H/O candidal vulvovaginitis 11/27/2017    Elevated blood-pressure reading without diagnosis of hypertension 11/27/2017    Viral gastroenteritis 03/25/2019    Asymptomatic bacteriuria 03/25/2019    Other fatigue 05/06/2019    Positive colorectal cancer screening using Cologuard test 06/17/2019    Grief reaction 03/22/2021     Past Medical History:   Diagnosis Date    Depression     Osteopenia       Objective   Vital Signs:   /85   Pulse 93   Ht 167.6 cm (65.98\")   Wt 99.5 kg (219 lb 6.4 oz)   BMI 35.43 kg/m²       ROS:   Review of Systems   Constitutional:  Positive for activity change, appetite change, fatigue and unexpected weight gain. Negative for chills, diaphoresis, fever and unexpected weight loss.   HENT: Negative.  Negative for congestion, ear discharge, ear pain, nosebleeds, rhinorrhea, sinus pressure and sore throat.    Eyes: Negative.  Negative for blurred vision, double vision, photophobia, pain, redness and visual disturbance.   Respiratory:  Positive for shortness of breath. Negative for apnea, cough, chest tightness, wheezing and stridor.    Cardiovascular:  Negative for chest pain, palpitations and leg swelling.   Gastrointestinal:  Positive for abdominal distention, abdominal pain and constipation. Negative for diarrhea, nausea and vomiting.   Endocrine: Negative.  Negative for " polydipsia, polyphagia and polyuria.   Genitourinary:  Positive for decreased urine volume, dysuria, flank pain, frequency, pelvic pain, pelvic pressure, urgency and urinary incontinence. Negative for difficulty urinating, dyspareunia, genital sores, hematuria and vaginal discharge.   Musculoskeletal:  Positive for back pain and gait problem. Negative for arthralgias and joint swelling.   Skin:  Positive for color change, dry skin and pallor. Negative for rash and wound.   Allergic/Immunologic: Negative.    Neurological:  Negative for dizziness, tremors, syncope, weakness, light-headedness, headache, memory problem and confusion.   Hematological: Negative.    Psychiatric/Behavioral:  Positive for sleep disturbance and stress. Negative for behavioral problems and decreased concentration.         Physical Exam  Constitutional:       General: She is in acute distress.      Appearance: She is well-developed. She is obese. She is ill-appearing.   HENT:      Head: Normocephalic and atraumatic.   Eyes:      Pupils: Pupils are equal, round, and reactive to light.   Neck:      Thyroid: No thyromegaly.      Trachea: No tracheal deviation.   Cardiovascular:      Rate and Rhythm: Normal rate and regular rhythm.      Heart sounds: No murmur heard.  Pulmonary:      Effort: Pulmonary effort is normal. No respiratory distress.      Breath sounds: Normal breath sounds. No stridor. No wheezing.   Abdominal:      General: Bowel sounds are normal. There is distension.      Palpations: Abdomen is soft.      Tenderness: There is abdominal tenderness.   Genitourinary:     Labia:         Right: No tenderness.         Left: No tenderness.       Vagina: Normal. No vaginal discharge.      Comments: Urine frequency, urgency, dysuria  Musculoskeletal:         General: Tenderness present. No deformity. Normal range of motion.      Cervical back: Normal range of motion.   Skin:     General: Skin is warm and dry.      Capillary Refill: Capillary  refill takes less than 2 seconds.      Coloration: Skin is not pale.      Findings: No erythema or rash.   Neurological:      Mental Status: She is alert and oriented to person, place, and time.      Cranial Nerves: No cranial nerve deficit.      Sensory: No sensory deficit.      Motor: Weakness present.      Coordination: Coordination normal.   Psychiatric:         Behavior: Behavior normal.         Thought Content: Thought content normal.         Judgment: Judgment normal.        Physical Exam  Vital Signs  Blood pressure reading is 195/98.  Result Review :     UA          9/30/2024    09:29 10/14/2024    11:41 10/21/2024    15:49   Urinalysis   Squamous Epithelial Cells, UA  13-20     Specific Gravity, UA  1.011     Ketones, UA Negative  Negative  Negative    Blood, UA  Moderate (2+)     Leukocytes, UA Negative  Large (3+)  Negative    Nitrite, UA  Negative     RBC, UA  6-10     WBC, UA  Too Numerous to Count     Bacteria, UA  None Seen       Urine Culture          9/30/2024    12:49 10/14/2024    11:41 10/21/2024    15:42   Urine Culture   Urine Culture No growth  25,000 CFU/mL Escherichia coli  Yeast isolated        Assessment and Plan    Problem List Items Addressed This Visit          Gastrointestinal Abdominal     Chronic idiopathic constipation    Relevant Medications    linaclotide (Linzess) 145 MCG capsule capsule    sennosides-docusate (Senokot S) 8.6-50 MG per tablet    Lower abdominal pain    Relevant Medications    linaclotide (Linzess) 145 MCG capsule capsule    sennosides-docusate (Senokot S) 8.6-50 MG per tablet       Genitourinary and Reproductive     Atrophic vaginitis    Urinary tract infection without hematuria - Primary    Relevant Orders    POC Urinalysis Dipstick, Automated (Completed)    Urine Culture - Urine, Urine, Random Void (Completed)     Assessment & Plan      ASSESSMENT  ACUTE  CYSTITIS/RECURRENT UTI/HYDRONEPHROSIS-RESOLVED    MS NICO CHAVES IS A  a pleasant 79-year-old female who  presents to the clinic for evaluation . This is a 1 month follow-up for acute cystitis without hematuria, recurrent UTIs, left hydronephrosis.  Patient returns to clinic today to review CT scan results which are unremarkable with improvement in her prior hydronephrosis.  On CT 10/14/2024, patient kidneys and ureters:  Unremarkable as visualized. No renal or ureteral stones. No significant hydronephrosis.    She did drop off another urine for recheck 2 weeks post clinic evaluation which was positive for E. coli.  Urine Culture 25,000 CFU/mL Escherichia coli Abnormal       She was started on Augmentin 875 mg and returns to clinic today for reevaluation.  She still reports dysuria, burning with urination, vaginal irritation and discomfort consistent with yeast vaginitis.  She reports flank pain, back pain without any CVA tenderness.  Repeat urinalysis is complete negative for leukocyte esterase, it is negative for nitrite, it is negative for gross/microscopic hematuria.  Postvoid residual is 0 mL  Urine Culture Yeast isolated Abnormal         OVERALL, She reports doing relatively better on her antibiotic prophylaxis, and would like to continue if indicated.  We discussed only treating positive infections.  Again,  We discussed the types of organisms that are found in the urinary tract indicating that the vast majority are results of the patient's own gastrointestinal fifi.  We discussed how many of the antibiotics that are utilized can actually exacerbate these infections by creating resistant organisms and there is only a very few antibiotics that are concentrated in the urine and do not affect the rectal reservoir nor cause recurrent yeast vaginitis.      We discussed the risk factors for recurrent infections being intercourse in younger patients and atrophic changes in older patients.  We discussed the symptoms that are found including pain, pressure, burning, frequency, urgency suprapubic pain and painful  intercourse.  I discussed upper tract symptoms including fevers, chills, and indicated the workup would be much more aggressive if the patient were to present with recurrent infections in the face of upper tract symptomatology such as fever. I discussed the history of vesicoureteral reflux in young patients and finally chronic renal scarring as a result of such.     Right Flank Pain/hydronephrosis and hydroureter:  We both reviewed and discussed her renal ultrasound done last week which showed significant but mild right-sided hydronephrosis and hydroureter with no distal ureteral stones as seen in the study.  Repeat CT is completely negative for hydronephrosis-resolved     She has chronic back pain,bilateral flank pain, she denies abdominal pain, pelvic pain or pressure.  He does not have CVA tenderness.  She denies having any more episodes of nausea, vomiting, or diarrhea. We discussed Hydronephrosis and hydroureter. Explained  Hydronephrosis as the swelling of a kidney due to a build-up of urine. It happens when urine cannot drain out from the kidney to the bladder from a blockage or obstruction. Hydronephrosis can occur in one or both kidneys.  Explained to patient that this can be caused by an anatomical defect that doesn't allow urine to drain properly, it could be a congenital blockage, a kidney stone, ETC. I explained hydroureter as a dilation of the ureter, and that both the presence of hydronephrosis or hydroureter can be physiologic or pathologic. It may be acute or chronic, unilateral or bilateral.    CKD-SATE 3-NEXT, WE DISCUSSED Causes of high creatinine levels, also the fact that High creatinine levels usually indicate that the kidneys are not working as well as they should. Possible causes of this dysfunction include:a kidney infection, glomerulonephritis, which is inflammation of the kidney structures that filter the blood, kidney stones that block the urinary tract, kidney failure.    We also  discussed the fact that, outside of kidney function, several other factors can temporarily raise creatinine levels above normal. These include dehydration and consuming large amounts of protein -- either through food sources or nutritional supplements. High intensity exercise can also increaseTrusted Source certain blood biomarkers, including creatinine. A person undergoing a creatinine test should let the doctor know if they are taking any medications, fasting, or adhering to a protein-rich diet.  Certain medicines, can cause a temporary increase in serum creatinine levels or damage the kidneys.Some health conditions can also damage these organs, leading to increased creatinine levels. These conditions include:diabetes, high blood pressure, heart disease            PLAN  Referral placed to nephrology Dr. ST-chronic kidney disease with EGFR 46    We resent her urine for culture. I will call her with results if any bacteria growth.    Start Diflucan 150 p.o. daily x 4 days-yeast vaginitis on urine culture 10/21/2024    Will Continue Macrobid 100 mg PO Daily -Suppressive Therapy if again positive urine culture.     She has been encouraged to ncrease her p.o. fluid intake to at least 1 to 2 L daily and avoid bladder irritants such as caffeine products, spicy foods, and citrusy foods.     I recommend concomitant probiotics with treatment with antibiotics to protect the rectal reservoir including over-the-counter yogurt preparations to jeronimo oral pills containing the appropriate probiotics. Patient reports the diligent use of Probiotics.    We discussed upper tract investigation, patient has been scheduled for CT renal mass protocol to evaluate degree of hydronephrosis-reviewed with hydro resolved    We discussed lower tract investigation via cystoscopy possible retrograde pyelogram procedure-deferred due to negative CT    She will continue all other medications as prescribed    Will see her back in 6 weeks with  repeat renal ultrasound.  If indicated if of hydronephrosis, we discussed retrograde Polygram procedure-deferred at this time    Also for Follow up in clinic and recheck her urinalysis at that time.  Also repeat her CMP at that time-CKD    She may return sooner if symptoms worsen    Patient is agreeable to plan of care.     1. Chronic Kidney Disease Stage III.-Summary  Her recent CMP on 08/19/2024 showed a creatinine of 1.21, BUN of 16, with an EGFR of 46.3, down from 53 three months prior. A CT scan of the abdomen and pelvis without contrast will be ordered to assess the severity of left hydronephrosis and potential blockage. Referral placed to nephrology for further evaluation. Foods to avoid with chronic kidney disease were discussed.    2. Mild Left Hydronephrosis.  An ultrasound on 09/18/2024 showed mild left hydronephrosis. A CT scan of the abdomen and pelvis without contrast will be ordered to determine the severity and cause. If the CT scan shows significant blockage, a retrograde pyelogram may be considered-reviewed negative hydro    3. Recurrent Urinary Tract Infections.  She reports recurrent UTIs over the past 7-8 months, with symptoms including burning, pressure, and frequency. Urinalysis today was negative for leukocyte esterase, nitrite, and hematuria. Urine sent off for culture. Treatment for infections will be considered only if the culture is positive to avoid further kidney strain. She is advised to keep a list of antibiotics that have worked or caused issues in the past.    4. Atrophic Vaginitis.  She is currently using Premarin cream for atrophic vaginitis and is advised to continue its use. She reports no bulge or straining during urination, and no changes in bowel habits that would necessitate stopping the cream.    Patient reports that she is not currently experiencing any symptoms of urinary incontinence.    RADIOLOGY (CT AND/OR KUB):    CT Abdomen and Pelvis: No results found for this or  any previous visit.     CT Stone Protocol: No results found for this or any previous visit.     KUB: No results found for this or any previous visit.       [unfilled]  LABS (3 MONTHS):    Office Visit on 10/21/2024   Component Date Value Ref Range Status    Color 10/21/2024 Yellow  Yellow, Straw, Dark Yellow, Leslie Final    Clarity, UA 10/21/2024 Clear  Clear Final    Specific Gravity  10/21/2024 1.005  1.005 - 1.030 Final    pH, Urine 10/21/2024 6.0  5.0 - 8.0 Final    Leukocytes 10/21/2024 Negative  Negative Final    Nitrite, UA 10/21/2024 Negative  Negative Final    Protein, POC 10/21/2024 Negative  Negative mg/dL Final    Glucose, UA 10/21/2024 Negative  Negative mg/dL Final    Ketones, UA 10/21/2024 Negative  Negative Final    Urobilinogen, UA 10/21/2024 Normal  Normal, 0.2 E.U./dL Final    Bilirubin 10/21/2024 Negative  Negative Final    Blood, UA 10/21/2024 Negative  Negative Final    Lot Number 10/21/2024 [id number]   Final    Expiration Date 10/21/2024 04/11/2026   Final    Urine Culture 10/21/2024 Yeast isolated (A)   Final   Lab on 10/14/2024   Component Date Value Ref Range Status    Color, UA 10/14/2024 Yellow  Yellow, Straw Final    Appearance, UA 10/14/2024 Turbid (A)  Clear Final    pH, UA 10/14/2024 6.0  5.0 - 8.0 Final    Specific Gravity, UA 10/14/2024 1.011  1.005 - 1.030 Final    Glucose, UA 10/14/2024 Negative  Negative Final    Ketones, UA 10/14/2024 Negative  Negative Final    Bilirubin, UA 10/14/2024 Negative  Negative Final    Blood, UA 10/14/2024 Moderate (2+) (A)  Negative Final    Protein, UA 10/14/2024 30 mg/dL (1+) (A)  Negative Final    Leuk Esterase, UA 10/14/2024 Large (3+) (A)  Negative Final    Nitrite, UA 10/14/2024 Negative  Negative Final    Urobilinogen, UA 10/14/2024 0.2 E.U./dL  0.2 - 1.0 E.U./dL Final    Extra Tube 10/14/2024 Hold for add-ons.   Final    Auto resulted.    RBC, UA 10/14/2024 6-10 (A)  None Seen, 0-2 /HPF Final    WBC, UA 10/14/2024 Too Numerous to  Count (A)  None Seen, 0-2 /HPF Final    Bacteria, UA 10/14/2024 None Seen  None Seen /HPF Final    Squamous Epithelial Cells, UA 10/14/2024 13-20 (A)  None Seen, 0-2 /HPF Final    Hyaline Casts, UA 10/14/2024 13-20  None Seen /LPF Final    Methodology 10/14/2024 Automated Microscopy   Final    Urine Culture 10/14/2024 25,000 CFU/mL Escherichia coli (A)   Final   Office Visit on 09/30/2024   Component Date Value Ref Range Status    Color 09/30/2024 Dark Yellow  Yellow, Straw, Dark Yellow, Leslie Final    Clarity, UA 09/30/2024 Clear  Clear Final    Specific Gravity  09/30/2024 1.005  1.005 - 1.030 Final    pH, Urine 09/30/2024 6.0  5.0 - 8.0 Final    Leukocytes 09/30/2024 Negative  Negative Final    Nitrite, UA 09/30/2024 Negative  Negative Final    Protein, POC 09/30/2024 Negative  Negative mg/dL Final    Glucose, UA 09/30/2024 Negative  Negative mg/dL Final    Ketones, UA 09/30/2024 Negative  Negative Final    Urobilinogen, UA 09/30/2024 Normal  Normal, 0.2 E.U./dL Final    Bilirubin 09/30/2024 Negative  Negative Final    Blood, UA 09/30/2024 Negative  Negative Final    Lot Number 09/30/2024 N   Final    Expiration Date 09/30/2024 N   Final    Urine Culture 09/30/2024 No growth   Final   Lab on 08/27/2024   Component Date Value Ref Range Status    Protein/Creatinine Ratio, Urine 08/27/2024 123.9  0.0 - 200.0 mg/G Crea Final    Creatinine, Urine 08/27/2024 54.9  mg/dL Final    Total Protein, Urine 08/27/2024 6.8  mg/dL Final    Hepatitis C Ab 08/27/2024 Non-Reactive  Non-Reactive Final    Hepatitis B Surface Ag 08/27/2024 Non-Reactive  Non-Reactive Final    Hep B S Ab 08/27/2024 Non-Reactive   Final    Sed Rate 08/27/2024 28  0 - 30 mm/hr Final   Office Visit on 08/19/2024   Component Date Value Ref Range Status    Glucose 08/19/2024 90  65 - 99 mg/dL Final    BUN 08/19/2024 16  8 - 23 mg/dL Final    Creatinine 08/19/2024 1.21 (H)  0.57 - 1.00 mg/dL Final    Sodium 08/19/2024 136  136 - 145 mmol/L Final    Potassium  08/19/2024 5.5 (H)  3.5 - 5.2 mmol/L Final    Chloride 08/19/2024 103  98 - 107 mmol/L Final    CO2 08/19/2024 22.5  22.0 - 29.0 mmol/L Final    Calcium 08/19/2024 10.0  8.6 - 10.5 mg/dL Final    Total Protein 08/19/2024 7.5  6.0 - 8.5 g/dL Final    Albumin 08/19/2024 4.3  3.5 - 5.2 g/dL Final    ALT (SGPT) 08/19/2024 14  1 - 33 U/L Final    AST (SGOT) 08/19/2024 22  1 - 32 U/L Final    Alkaline Phosphatase 08/19/2024 100  39 - 117 U/L Final    Total Bilirubin 08/19/2024 0.3  0.0 - 1.2 mg/dL Final    Globulin 08/19/2024 3.2  gm/dL Final    A/G Ratio 08/19/2024 1.3  g/dL Final    BUN/Creatinine Ratio 08/19/2024 13.2  7.0 - 25.0 Final    Anion Gap 08/19/2024 10.5  5.0 - 15.0 mmol/L Final    eGFR 08/19/2024 46.3 (L)  >60.0 mL/min/1.73 Final    Total Cholesterol 08/19/2024 189  0 - 200 mg/dL Final    Triglycerides 08/19/2024 70  0 - 150 mg/dL Final    HDL Cholesterol 08/19/2024 53  40 - 60 mg/dL Final    LDL Cholesterol  08/19/2024 123 (H)  0 - 100 mg/dL Final    VLDL Cholesterol 08/19/2024 13  5 - 40 mg/dL Final    LDL/HDL Ratio 08/19/2024 2.30   Final    TSH 08/19/2024 2.910  0.270 - 4.200 uIU/mL Final    25 Hydroxy, Vitamin D 08/19/2024 60.5  30.0 - 100.0 ng/ml Final    WBC 08/19/2024 7.17  3.40 - 10.80 10*3/mm3 Final    RBC 08/19/2024 4.20  3.77 - 5.28 10*6/mm3 Final    Hemoglobin 08/19/2024 13.0  12.0 - 15.9 g/dL Final    Hematocrit 08/19/2024 37.5  34.0 - 46.6 % Final    MCV 08/19/2024 89.3  79.0 - 97.0 fL Final    MCH 08/19/2024 31.0  26.6 - 33.0 pg Final    MCHC 08/19/2024 34.7  31.5 - 35.7 g/dL Final    RDW 08/19/2024 13.1  12.3 - 15.4 % Final    RDW-SD 08/19/2024 42.2  37.0 - 54.0 fl Final    MPV 08/19/2024 11.4  6.0 - 12.0 fL Final    Platelets 08/19/2024 406  140 - 450 10*3/mm3 Final    Neutrophil % 08/19/2024 69.1  42.7 - 76.0 % Final    Lymphocyte % 08/19/2024 18.4 (L)  19.6 - 45.3 % Final    Monocyte % 08/19/2024 7.3  5.0 - 12.0 % Final    Eosinophil % 08/19/2024 3.1  0.3 - 6.2 % Final    Basophil %  08/19/2024 1.7 (H)  0.0 - 1.5 % Final    Immature Grans % 08/19/2024 0.4  0.0 - 0.5 % Final    Neutrophils, Absolute 08/19/2024 4.96  1.70 - 7.00 10*3/mm3 Final    Lymphocytes, Absolute 08/19/2024 1.32  0.70 - 3.10 10*3/mm3 Final    Monocytes, Absolute 08/19/2024 0.52  0.10 - 0.90 10*3/mm3 Final    Eosinophils, Absolute 08/19/2024 0.22  0.00 - 0.40 10*3/mm3 Final    Basophils, Absolute 08/19/2024 0.12  0.00 - 0.20 10*3/mm3 Final    Immature Grans, Absolute 08/19/2024 0.03  0.00 - 0.05 10*3/mm3 Final    nRBC 08/19/2024 0.0  0.0 - 0.2 /100 WBC Final   Lab on 07/22/2024   Component Date Value Ref Range Status    Urine Culture 07/22/2024 50,000 CFU/mL Escherichia coli (A)   Final      Follow Up   Return in about 6 weeks (around 12/2/2024) for Next scheduled follow up, RECURRENT UTI/DYSURIA/DETRUSSOR INSTABILITY/atrophic vaginitis-repeat UA.    Patient was given instructions and counseling regarding her condition or for health maintenance advice. Please see specific information pulled into the AVS if appropriate.          This document has been electronically signed by Griselda Cheng-Akwa, APRN   October 24, 2024 14:54 EDT      Dictated Utilizing Dragon Dictation: Part of this note may be an electronic transcription/translation of spoken language to printed text using the Dragon Dictation System.      Patient or patient representative verbalized consent for the use of Ambient Listening during the visit with  Griselda Cheng-Akwa, APRN for chart documentation. 10/24/2024  21:26 EDT

## 2024-10-22 LAB — BACTERIA SPEC AEROBE CULT: ABNORMAL

## 2024-10-24 ENCOUNTER — TELEPHONE (OUTPATIENT)
Dept: UROLOGY | Facility: CLINIC | Age: 79
End: 2024-10-24
Payer: MEDICARE

## 2024-10-24 DIAGNOSIS — B37.9 YEAST INFECTION: Primary | ICD-10-CM

## 2024-10-24 PROBLEM — K59.04 CHRONIC IDIOPATHIC CONSTIPATION: Status: ACTIVE | Noted: 2024-10-24

## 2024-10-24 PROBLEM — R10.30 LOWER ABDOMINAL PAIN: Status: ACTIVE | Noted: 2024-10-24

## 2024-10-24 RX ORDER — FLUCONAZOLE 150 MG/1
150 TABLET ORAL DAILY
Qty: 2 TABLET | Refills: 0 | Status: SHIPPED | OUTPATIENT
Start: 2024-10-24 | End: 2024-10-28

## 2024-10-24 NOTE — TELEPHONE ENCOUNTER
I called pt with negative urine culture results but yeast infection pt verbalized understanding and will  medication.    ----- Message from Griselda Cheng-Akwa sent at 10/24/2024 12:21 AM EDT -----  Please let patient know urine culture showed yeast isolated.  I have sent in Diflucan to her pharmacy to take daily for 5 days.  Follow-up in clinic as discussed.    Urine Culture Yeast isolated Abnormal     No further work up.  Colonization of the urinary tract without infection is common. Treatment is discouraged unless the patient is symptomatic, pregnant, or undergoing an invasive urologic

## 2024-11-22 ENCOUNTER — TELEPHONE (OUTPATIENT)
Dept: UROLOGY | Facility: CLINIC | Age: 79
End: 2024-11-22
Payer: MEDICARE

## 2024-11-22 DIAGNOSIS — R10.30 LOWER ABDOMINAL PAIN: Primary | ICD-10-CM

## 2024-11-22 DIAGNOSIS — N13.30 HYDRONEPHROSIS OF LEFT KIDNEY: ICD-10-CM

## 2024-11-22 DIAGNOSIS — R10.9 BILATERAL FLANK PAIN: ICD-10-CM

## 2024-11-25 ENCOUNTER — TELEPHONE (OUTPATIENT)
Dept: GASTROENTEROLOGY | Facility: CLINIC | Age: 79
End: 2024-11-25
Payer: MEDICARE

## 2024-11-25 DIAGNOSIS — N30.00 ACUTE CYSTITIS WITHOUT HEMATURIA: Primary | ICD-10-CM

## 2024-11-25 NOTE — TELEPHONE ENCOUNTER
Routing to Edward. Homero rmessage:      HELLO,  US ORDER IS ALREADY IN THE CHART FROM 11/22, PLEASE LET PATIENT KNW. dR. KELLY KEITH DO ROUTINE LABS.  THANK YOU       I tred to call pt. The US is in and so is a Urine culture

## 2024-11-25 NOTE — TELEPHONE ENCOUNTER
Patient called in. She says she is supposed to have blood work and imaging before her appointment but nothing has been ordered and she wants to have it done with Dr. Holland office in [place] ([facility]) and needs orders put in.

## 2024-12-02 ENCOUNTER — LAB (OUTPATIENT)
Dept: FAMILY MEDICINE CLINIC | Facility: CLINIC | Age: 79
End: 2024-12-02
Payer: MEDICARE

## 2024-12-02 DIAGNOSIS — N30.00 ACUTE CYSTITIS WITHOUT HEMATURIA: ICD-10-CM

## 2024-12-02 DIAGNOSIS — N18.31 CHRONIC RENAL FAILURE, STAGE 3A: ICD-10-CM

## 2024-12-02 PROCEDURE — 87086 URINE CULTURE/COLONY COUNT: CPT | Performed by: GENERAL PRACTICE

## 2024-12-02 PROCEDURE — 82570 ASSAY OF URINE CREATININE: CPT | Performed by: GENERAL PRACTICE

## 2024-12-02 PROCEDURE — 82043 UR ALBUMIN QUANTITATIVE: CPT | Performed by: GENERAL PRACTICE

## 2024-12-03 ENCOUNTER — HOSPITAL ENCOUNTER (OUTPATIENT)
Facility: HOSPITAL | Age: 79
Discharge: HOME | End: 2024-12-03
Admitting: NURSE PRACTITIONER
Payer: MEDICARE

## 2024-12-03 DIAGNOSIS — R10.9 BILATERAL FLANK PAIN: ICD-10-CM

## 2024-12-03 DIAGNOSIS — N13.30 HYDRONEPHROSIS OF LEFT KIDNEY: ICD-10-CM

## 2024-12-03 DIAGNOSIS — R10.30 LOWER ABDOMINAL PAIN: ICD-10-CM

## 2024-12-03 LAB
ALBUMIN UR-MCNC: 2.4 MG/DL
BACTERIA SPEC AEROBE CULT: NO GROWTH
CREAT UR-MCNC: 79.9 MG/DL
MICROALBUMIN/CREAT UR: 30 MG/G (ref 0–29)

## 2024-12-03 PROCEDURE — 76775 US EXAM ABDO BACK WALL LIM: CPT

## 2024-12-04 ENCOUNTER — TELEPHONE (OUTPATIENT)
Dept: UROLOGY | Facility: CLINIC | Age: 79
End: 2024-12-04
Payer: MEDICARE

## 2024-12-04 NOTE — TELEPHONE ENCOUNTER
I called pt with negative urine culture pt verbalized understanding,.    ----- Message from Griselda Cheng-Akwa sent at 12/4/2024  8:42 AM EST -----  Please kindly let patient know her urine culture results are again negative for any bacterial infection at this time.    Urine Culture - No growth    However she may drop off another urine dip if she feels symptomatic.    If not follow-up in clinic as discussed 12/10/2024    Thank you

## 2024-12-10 ENCOUNTER — OFFICE VISIT (OUTPATIENT)
Dept: UROLOGY | Facility: CLINIC | Age: 79
End: 2024-12-10
Payer: MEDICARE

## 2024-12-10 VITALS
SYSTOLIC BLOOD PRESSURE: 163 MMHG | DIASTOLIC BLOOD PRESSURE: 90 MMHG | WEIGHT: 216 LBS | HEART RATE: 85 BPM | BODY MASS INDEX: 34.72 KG/M2 | HEIGHT: 66 IN

## 2024-12-10 DIAGNOSIS — Z87.440 HISTORY OF RECURRENT UTIS: ICD-10-CM

## 2024-12-10 DIAGNOSIS — N13.30 HYDRONEPHROSIS OF RIGHT KIDNEY: ICD-10-CM

## 2024-12-10 DIAGNOSIS — N39.0 URINARY TRACT INFECTION WITHOUT HEMATURIA, SITE UNSPECIFIED: Primary | ICD-10-CM

## 2024-12-10 DIAGNOSIS — N18.31 STAGE 3A CHRONIC KIDNEY DISEASE: ICD-10-CM

## 2024-12-10 LAB
BILIRUB BLD-MCNC: NEGATIVE MG/DL
CLARITY, POC: CLEAR
COLOR UR: YELLOW
EXPIRATION DATE: ABNORMAL
GLUCOSE UR STRIP-MCNC: NEGATIVE MG/DL
KETONES UR QL: NEGATIVE
LEUKOCYTE EST, POC: NEGATIVE
Lab: ABNORMAL
NITRITE UR-MCNC: NEGATIVE MG/ML
PH UR: 6 [PH] (ref 5–8)
PROT UR STRIP-MCNC: NEGATIVE MG/DL
RBC # UR STRIP: ABNORMAL /UL
SP GR UR: 1 (ref 1–1.03)
UROBILINOGEN UR QL: NORMAL

## 2024-12-10 PROCEDURE — 81003 URINALYSIS AUTO W/O SCOPE: CPT | Performed by: NURSE PRACTITIONER

## 2024-12-10 PROCEDURE — 1159F MED LIST DOCD IN RCRD: CPT | Performed by: NURSE PRACTITIONER

## 2024-12-10 PROCEDURE — 99214 OFFICE O/P EST MOD 30 MIN: CPT | Performed by: NURSE PRACTITIONER

## 2024-12-10 PROCEDURE — 80053 COMPREHEN METABOLIC PANEL: CPT | Performed by: NURSE PRACTITIONER

## 2024-12-10 PROCEDURE — 3080F DIAST BP >= 90 MM HG: CPT | Performed by: NURSE PRACTITIONER

## 2024-12-10 PROCEDURE — 3077F SYST BP >= 140 MM HG: CPT | Performed by: NURSE PRACTITIONER

## 2024-12-10 PROCEDURE — 85027 COMPLETE CBC AUTOMATED: CPT | Performed by: NURSE PRACTITIONER

## 2024-12-10 PROCEDURE — 87086 URINE CULTURE/COLONY COUNT: CPT | Performed by: NURSE PRACTITIONER

## 2024-12-10 PROCEDURE — 1160F RVW MEDS BY RX/DR IN RCRD: CPT | Performed by: NURSE PRACTITIONER

## 2024-12-10 NOTE — PROGRESS NOTES
Chief Complaint  Urinary tract infection without hematuria, site unspecified (6 WEEKS FOLLOW UP FOR UTI/OAB/DI/ESTIVEN) and acute cystitis     Subjective        Tennille Gasca presents to [facility] for  CKD-3, LEFT HYDRONEPHROSIS/UTI/OAB/atrophic vaginitis    History of Present Illness   History of Present Illness  The patient is a pleasant 79-year-old female who presents to the clinic for evaluation .  This is a 6 weeks follow-up for acute cystitis without hematuria, recurrent UTIs, left hydronephrosis.  When last evaluated in clinic on 10/21/2024, patient had return to clinic to review CT scan results which was unremarkable with improvement in her prior hydronephrosis.  On CT 10/14/2024, patient kidneys and ureters:  Unremarkable as visualized. No renal or ureteral stones. No significant hydronephrosis.    Today 12/03/2024  She is in no apparent discomfort today and reports doing significantly better.  Urinalysis in clinic today is complete negative for leukocyte esterase, it is negative for nitrites, it is negative for gross/microscopic hematuria.  Her PVR is 0 mL. On her last clinic evaluation, patient's last urine culture was also negative for any bacterial growth.  Urine Culture No growth 12/02/24        She also did have a renal ultrasound completed 12/3/2024 which was unremarkable with Nothing specifically identified to account for the patient prior symptoms.     She was initially evaluated in clinic on 09/30/2024 as a new patient consult. She had been referred by her primary care physician, Dr. Ludwig Tabares, due to concerns of stage 3 chronic kidney disease and mild left hydronephrosis detected on a renal ultrasound conducted on 09/18/2024.  We reviewed her recent CMP completed 08/19/2024 with creatinine of 1.21, BUN of 16, with an EGFR of 46.3.  Patient's EGFR was 50 ON  9/9/2023, he was 59.9 in 2022    On LAST clinic evaluation, she also expressed concerns  about recurrent urinary tract infections (UTIs) THAT  have been ongoing and recently becoming very bothersome to her, and potential bladder prolapse.  Patient reported that over the past 7 to 8 months, she has experienced recurrent bladder infections occurring monthly, with a brief respite before the next one. Symptoms include a burning sensation, pressure, frequent urination, and incontinence, for which she uses Depends for protection. Without medication, she experiences urgency. The most recent infection was particularly painful. She reports no presence of blood in her urine. Nevertheless her urine culture post clinic evaluation was negative  Urine Culture 09/30/24 No growth        She was advised to drop off another urine dip in 2 weeks for recheck which was positive.  Patient was started on Augmentin twice daily x 7 days  Urine Culture 25,000 CFU/mL Escherichia coli Abnormal       This morning,10/21/24, she is post antibiotic therapy and denies any side effects from her medications.      Nevertheless, she experienced pain, which she attributes to stress from visiting the doctor.  Patient is systolic blood pressure is greater than 189/100 she reports her urinary symptoms have remained the same with frequency, urgency and intermittent bouts of dysuria.  She does have vaginal irritation and discomfort consistent with yeast vaginitis.  She has been on antibiotic therapy with amoxicillin for her prior E. coli UTI, upper respiratory infection she reports.  Nevertheless her urinalysis again is complete negative for leukocyte esterase, it is negative for nitrite, it is negative for gross/microscopic hematuria.  PVR 0 mL.  We discussed Diflucan 150 mg x 1 dose.  May repeat in 7 days if symptomatic.  Urine Culture 10/21/24 Yeast isolated Abnormal         Overall, patient reports that she has a history of constipation alternating with loose stools and suspects that E. coli may be leaking during these episodes. She  maintains good hygiene, using Theraworx wipes after using tissue paper. She does not use any stool softeners or laxatives and typically has one bowel movement per day. Changing her cereal has improved her condition. She has tried various antibiotics, with the most recent one being effective, though she had difficulty tolerating the previous one, which was prescribed twice. Since May 2024, she has had 6 or 7 infections, each treated with antibiotics. She also has issues with yeast infections and takes probiotics. She is not diabetic and has never had a kidney stone. She did not have many infections as a child.    Occasionally, she experiences back pain which she is chronically origin, more on the left side, but it is not constant. She does not take diuretics. Dr. Hurley in [place] advised her to use Estrace cream twice a week for atrophic vaginitis, and also to prevent bladder prolapse. She does not feel any bulge consistent with bladder prolapse and does not strain to urinate.    Her medical history includes recurrent UTIs, essential hypertension, hypothyroidism, anxiety and depression, previous nonmelanoma skin cancer, and atrophic vaginitis, for which she is currently using estrogen Premarin cream. She takes vitamin D3, vitamin B12, a multivitamin, and CoQ10. She does not take cold medicines or antihistamines and consumes a high-protein diet. She is on losartan for her blood pressure.    FAMILY HISTORY  She denies any family history of kidney problems.    ALLERGIES  She is allergic to MACROBID and SULFA.    FINDINGS:US Renal Bilateral  12/03/24  Imaging of the right kidney demonstrates the right kidney measuring  10.57 x 4.77 x 4.34 cm. No solid mass or hydronephrosis.   Left kidney measures 9.46 x 4.22 x 3.49 cm. No solid mass or  hydronephrosis   IMPRESSION:US Renal Bilateral 12/03/24  1. Nothing specifically identified to account for the patient symptoms.    IMPRESSION:CT Abdomen Without Contrast  10/14/24  1.   No renal or ureteral stones. No significant hydronephrosis.  2.  Large hiatal hernia.  3.  Simple appearing hepatic cysts.  4.  Cholelithiasis.  5.  Cardiomegaly and coronary artery calcifications.  6. Other nonacute findings above.    FINDINGS:US Abdomen Complete  09/18/24  IMPRESSION:  1.  Mild right hydronephrosis. No shadowing stone identified.  2.  Cholelithiasis.  3. Otherwise unremarkable exam.     Active Ambulatory Problems     Diagnosis Date Noted    Age-related osteoporosis without current pathological fracture     Hypothyroidism due to acquired atrophy of thyroid     Recurrent major depressive disorder, in partial remission     Vitamin D deficiency 09/26/2016    Class 2 severe obesity with serious comorbidity and body mass index (BMI) of 36.0 to 36.9 in adult 09/26/2016    Menopausal symptom 09/26/2016    Healthcare maintenance 09/26/2016    Bilateral knee pain 09/26/2016    Encounter for screening mammogram for breast cancer 03/28/2017    Encounter for immunization 11/27/2017    Asymptomatic cholelithiasis 03/25/2019    Atrophic vaginitis 03/26/2019    Essential hypertension 12/19/2019    History of nonmelanoma skin cancer 07/21/2022    Paroxysmal SVT (supraventricular tachycardia) 02/18/2023    Urinary tract infection without hematuria 09/18/2023    Stage 3a chronic kidney disease 08/20/2024    Hydronephrosis of right kidney 09/18/2024    Bilateral flank pain 09/30/2024    History of recurrent UTIs 09/30/2024    Chronic idiopathic constipation 10/24/2024    Lower abdominal pain 10/24/2024     Resolved Ambulatory Problems     Diagnosis Date Noted    Dysuria 10/12/2017    H/O candidal vulvovaginitis 11/27/2017    Elevated blood-pressure reading without diagnosis of hypertension 11/27/2017    Viral gastroenteritis 03/25/2019    Asymptomatic bacteriuria 03/25/2019    Other fatigue 05/06/2019    Positive colorectal cancer screening using Cologuard test 06/17/2019    Grief reaction 03/22/2021     Past  "Medical History:   Diagnosis Date    Depression     Osteopenia       Objective   Vital Signs:   /90 (BP Location: Right arm, Patient Position: Sitting, Cuff Size: Large Adult)   Pulse 85   Ht 167.6 cm (65.98\")   Wt 98 kg (216 lb)   BMI 34.88 kg/m²       ROS:   Review of Systems   Constitutional:  Positive for fatigue. Negative for activity change, appetite change, chills, diaphoresis, fever, unexpected weight gain and unexpected weight loss.   HENT: Negative.  Negative for congestion, ear discharge, ear pain, nosebleeds, rhinorrhea, sinus pressure and sore throat.    Eyes: Negative.  Negative for blurred vision, double vision, photophobia, pain, redness and visual disturbance.   Respiratory:  Positive for shortness of breath. Negative for apnea, cough, chest tightness, wheezing and stridor.    Cardiovascular:  Negative for chest pain, palpitations and leg swelling.   Gastrointestinal:  Positive for constipation. Negative for abdominal distention, abdominal pain, diarrhea, nausea and vomiting.   Endocrine: Negative.  Negative for polydipsia, polyphagia and polyuria.   Genitourinary:  Positive for frequency and urinary incontinence. Negative for decreased urine volume, difficulty urinating, dyspareunia, dysuria, flank pain, genital sores, hematuria, pelvic pain, pelvic pressure, urgency and vaginal discharge.   Musculoskeletal:  Positive for back pain and gait problem. Negative for arthralgias and joint swelling.   Skin:  Positive for color change and dry skin. Negative for pallor, rash and wound.   Allergic/Immunologic: Negative.  Negative for food allergies.   Neurological:  Negative for dizziness, tremors, syncope, weakness, light-headedness, headache, memory problem and confusion.   Hematological:  Bruises/bleeds easily.   Psychiatric/Behavioral:  Positive for sleep disturbance and stress. Negative for behavioral problems and decreased concentration. The patient is nervous/anxious.         Physical " Exam  Constitutional:       General: She is in acute distress.      Appearance: She is well-developed. She is obese. She is ill-appearing.   HENT:      Head: Normocephalic and atraumatic.   Eyes:      Pupils: Pupils are equal, round, and reactive to light.   Neck:      Thyroid: No thyromegaly.      Trachea: No tracheal deviation.   Cardiovascular:      Rate and Rhythm: Normal rate and regular rhythm.      Heart sounds: No murmur heard.  Pulmonary:      Effort: Pulmonary effort is normal. No respiratory distress.      Breath sounds: Normal breath sounds. No stridor. No wheezing.   Abdominal:      General: Bowel sounds are normal. There is distension.      Palpations: Abdomen is soft.      Tenderness: There is abdominal tenderness.   Genitourinary:     Labia:         Right: No tenderness.         Left: No tenderness.       Vagina: Normal. No vaginal discharge.      Comments: Urine frequency, urgency, dysuria  Musculoskeletal:         General: Tenderness present. No deformity. Normal range of motion.      Cervical back: Normal range of motion.   Skin:     General: Skin is warm and dry.      Capillary Refill: Capillary refill takes less than 2 seconds.      Coloration: Skin is not pale.      Findings: No erythema or rash.   Neurological:      Mental Status: She is alert and oriented to person, place, and time.      Cranial Nerves: No cranial nerve deficit.      Sensory: No sensory deficit.      Motor: Weakness present.      Coordination: Coordination normal.   Psychiatric:         Behavior: Behavior normal.         Thought Content: Thought content normal.         Judgment: Judgment normal.        Physical Exam  Vital Signs  Blood pressure reading is 195/98.  Result Review :     UA          10/14/2024    11:41 10/21/2024    15:49 12/10/2024    13:58   Urinalysis   Squamous Epithelial Cells, UA 13-20      Specific Gravity, UA 1.011      Ketones, UA Negative  Negative  Negative    Blood, UA Moderate (2+)      Leukocytes,  UA Large (3+)  Negative  Negative    Nitrite, UA Negative      RBC, UA 6-10      WBC, UA Too Numerous to Count      Bacteria, UA None Seen        Urine Culture          10/21/2024    15:42 12/2/2024    11:19 12/10/2024    13:58   Urine Culture   Urine Culture Yeast isolated  No growth  No growth  P      Details         P Preliminary result               Assessment and Plan    Problem List Items Addressed This Visit          Genitourinary and Reproductive     Urinary tract infection without hematuria - Primary    Relevant Orders    POC Urinalysis Dipstick, Automated (Completed)    Urine Culture - Urine, Urine, Random Void (Completed)    Stage 3a chronic kidney disease    Relevant Orders    Comprehensive Metabolic Panel (Completed)    CBC (No Diff) (Completed)    Hydronephrosis of right kidney    Relevant Orders    Comprehensive Metabolic Panel (Completed)    CBC (No Diff) (Completed)    History of recurrent UTIs    Relevant Orders    CBC (No Diff) (Completed)     Assessment & Plan      ASSESSMENT  ACUTE  CYSTITIS/RECURRENT UTI/HYDRONEPHROSIS-RESOLVED    MS NICO CHAVES IS A  a pleasant 79-year-old female who presents to the clinic for evaluation . This is a 6 weeks follow-up for acute cystitis without hematuria, recurrent UTIs, left hydronephrosis.  Patient returns to clinic today to review CT scan results which are unremarkable with improvement in her prior hydronephrosis.  On CT 10/14/2024, patient kidneys and ureters:  Unremarkable as visualized. No renal or ureteral stones. No significant hydronephrosis.    Today 12/03/2024  She is in no apparent discomfort today and reports doing significantly better.  Urinalysis in clinic today is complete negative for leukocyte esterase, it is negative for nitrites, it is negative for gross/microscopic hematuria.  Her PVR is 0 mL.  On her last urine culture was also negative for any bacterial growth  Urine Culture No growth 12/02/24        She also did have a renal ultrasound  completed 12/3/2024 which was unremarkable with Nothing specifically identified to account for the patient prior symptoms.     She did drop off another urine for recheck 2 weeks post clinic evaluation which was positive for E. coli.  Urine Culture 25,000 CFU/mL Escherichia coli Abnormal       She was started on Augmentin 875 mg and returns to clinic today for reevaluation.  She still reports dysuria, burning with urination, vaginal irritation and discomfort consistent with yeast vaginitis.  She reports flank pain, back pain without any CVA tenderness.  Repeat urinalysis is complete negative for leukocyte esterase, it is negative for nitrite, it is negative for gross/microscopic hematuria.  Postvoid residual is 0 mL  Urine Culture Yeast isolated Abnormal         OVERALL, She reports doing relatively better on her antibiotic prophylaxis, and would like to continue if indicated.  We discussed only treating positive infections.  Again,  We discussed the types of organisms that are found in the urinary tract indicating that the vast majority are results of the patient's own gastrointestinal fifi.  We discussed how many of the antibiotics that are utilized can actually exacerbate these infections by creating resistant organisms and there is only a very few antibiotics that are concentrated in the urine and do not affect the rectal reservoir nor cause recurrent yeast vaginitis.      We discussed the risk factors for recurrent infections being intercourse in younger patients and atrophic changes in older patients.  We discussed the symptoms that are found including pain, pressure, burning, frequency, urgency suprapubic pain and painful intercourse.  I discussed upper tract symptoms including fevers, chills, and indicated the workup would be much more aggressive if the patient were to present with recurrent infections in the face of upper tract symptomatology such as fever. I discussed the history of vesicoureteral reflux  in young patients and finally chronic renal scarring as a result of such.     Right Flank Pain/hydronephrosis and hydroureter:  We both reviewed and discussed her renal ultrasound done last week which showed significant but mild right-sided hydronephrosis and hydroureter with no distal ureteral stones as seen in the study.  Repeat CT is completely negative for hydronephrosis-resolved     She has chronic back pain,bilateral flank pain, she denies abdominal pain, pelvic pain or pressure.  He does not have CVA tenderness.  She denies having any more episodes of nausea, vomiting, or diarrhea. We discussed Hydronephrosis and hydroureter. Explained  Hydronephrosis as the swelling of a kidney due to a build-up of urine. It happens when urine cannot drain out from the kidney to the bladder from a blockage or obstruction. Hydronephrosis can occur in one or both kidneys.  Explained to patient that this can be caused by an anatomical defect that doesn't allow urine to drain properly, it could be a congenital blockage, a kidney stone, ETC. I explained hydroureter as a dilation of the ureter, and that both the presence of hydronephrosis or hydroureter can be physiologic or pathologic. It may be acute or chronic, unilateral or bilateral.    CKD-SATE 3-NEXT, WE DISCUSSED Causes of high creatinine levels, also the fact that High creatinine levels usually indicate that the kidneys are not working as well as they should. Possible causes of this dysfunction include:a kidney infection, glomerulonephritis, which is inflammation of the kidney structures that filter the blood, kidney stones that block the urinary tract, kidney failure.    We also discussed the fact that, outside of kidney function, several other factors can temporarily raise creatinine levels above normal. These include dehydration and consuming large amounts of protein -- either through food sources or nutritional supplements. High intensity exercise can also  increaseTrusted Source certain blood biomarkers, including creatinine. A person undergoing a creatinine test should let the doctor know if they are taking any medications, fasting, or adhering to a protein-rich diet.  Certain medicines, can cause a temporary increase in serum creatinine levels or damage the kidneys.Some health conditions can also damage these organs, leading to increased creatinine levels. These conditions include:diabetes, high blood pressure, heart disease            PLAN  Referral placed to Nephrology Dr. ST-chronic kidney disease with EGFR 46    We resent her urine for culture. I will call her with results if any bacteria growth.    Will Continue Macrobid 100 mg PO Daily -Suppressive Therapy if again positive urine culture-deferred urine culture negative.     She drop off another urine dip in clinic for recheck if symptomatic.    She has been encouraged to Increase her p.o. fluid intake to at least 1 to 2 L daily and avoid bladder irritants such as caffeine products, spicy foods, and citrusy foods.     I recommend concomitant probiotics with treatment with antibiotics to protect the rectal reservoir including over-the-counter yogurt preparations to jeronimo oral pills containing the appropriate probiotics. Patient reports the diligent use of Probiotics.    We discussed upper tract investigation, patient has been scheduled for CT renal mass protocol to evaluate degree of hydronephrosis-reviewed with hydro resolved    We discussed lower tract investigation via cystoscopy possible retrograde pyelogram procedure-deferred due to negative CT    She will continue all other medications as prescribed    Will see her back in 6 months with repeat renal ultrasound.     Also for Follow up in clinic and recheck her urinalysis at that time.      Also repeat her CMP at that time-CKD    She may return sooner if symptoms worsen    Patient is agreeable to plan of care.     1. Chronic Kidney Disease Stage  III.-Summary  Her recent CMP on 08/19/2024 showed a creatinine of 1.21, BUN of 16, with an EGFR of 46.3, down from 53 three months prior. A CT scan of the abdomen and pelvis without contrast will be ordered to assess the severity of left hydronephrosis and potential blockage. Referral placed to nephrology for further evaluation. Foods to avoid with chronic kidney disease were discussed.    2. Mild Left Hydronephrosis.  An ultrasound on 09/18/2024 showed mild left hydronephrosis. A CT scan of the abdomen and pelvis without contrast will be ordered to determine the severity and cause. If the CT scan shows significant blockage, a retrograde pyelogram may be considered-reviewed negative hydro    3. Recurrent Urinary Tract Infections.  She reports recurrent UTIs over the past 7-8 months, with symptoms including burning, pressure, and frequency. Urinalysis today was negative for leukocyte esterase, nitrite, and hematuria. Urine sent off for culture. Treatment for infections will be considered only if the culture is positive to avoid further kidney strain. She is advised to keep a list of antibiotics that have worked or caused issues in the past.    4. Atrophic Vaginitis.  She is currently using Premarin cream for atrophic vaginitis and is advised to continue its use. She reports no bulge or straining during urination, and no changes in bowel habits that would necessitate stopping the cream.    Patient reports that she is not currently experiencing any symptoms of urinary incontinence.    RADIOLOGY (CT AND/OR KUB):    CT Abdomen and Pelvis: No results found for this or any previous visit.     CT Stone Protocol: No results found for this or any previous visit.     KUB: No results found for this or any previous visit.       [unfilled]  LABS (3 MONTHS):    Office Visit on 12/10/2024   Component Date Value Ref Range Status    Color 12/10/2024 Yellow  Yellow, Straw, Dark Yellow, Leslie Final    Clarity, UA 12/10/2024 Clear   Clear Final    Specific Gravity  12/10/2024 1.000 (A)  1.005 - 1.030 Final    pH, Urine 12/10/2024 6.0  5.0 - 8.0 Final    Leukocytes 12/10/2024 Negative  Negative Final    Nitrite, UA 12/10/2024 Negative  Negative Final    Protein, POC 12/10/2024 Negative  Negative mg/dL Final    Glucose, UA 12/10/2024 Negative  Negative mg/dL Final    Ketones, UA 12/10/2024 Negative  Negative Final    Urobilinogen, UA 12/10/2024 Normal  Normal, 0.2 E.U./dL Final    Bilirubin 12/10/2024 Negative  Negative Final    Blood, UA 12/10/2024 Trace (A)  Negative Final    Lot Number 12/10/2024 N   Final    Expiration Date 12/10/2024 N   Final    Urine Culture 12/10/2024 No growth   Preliminary    Glucose 12/10/2024 96  65 - 99 mg/dL Final    BUN 12/10/2024 19  8 - 23 mg/dL Final    Creatinine 12/10/2024 1.18 (H)  0.57 - 1.00 mg/dL Final    Sodium 12/10/2024 138  136 - 145 mmol/L Final    Potassium 12/10/2024 4.4  3.5 - 5.2 mmol/L Final    Chloride 12/10/2024 104  98 - 107 mmol/L Final    CO2 12/10/2024 21.1 (L)  22.0 - 29.0 mmol/L Final    Calcium 12/10/2024 9.5  8.6 - 10.5 mg/dL Final    Total Protein 12/10/2024 7.2  6.0 - 8.5 g/dL Final    Albumin 12/10/2024 4.2  3.5 - 5.2 g/dL Final    ALT (SGPT) 12/10/2024 10  1 - 33 U/L Final    AST (SGOT) 12/10/2024 17  1 - 32 U/L Final    Alkaline Phosphatase 12/10/2024 97  39 - 117 U/L Final    Total Bilirubin 12/10/2024 0.4  0.0 - 1.2 mg/dL Final    Globulin 12/10/2024 3.0  gm/dL Final    A/G Ratio 12/10/2024 1.4  g/dL Final    BUN/Creatinine Ratio 12/10/2024 16.1  7.0 - 25.0 Final    Anion Gap 12/10/2024 12.9  5.0 - 15.0 mmol/L Final    eGFR 12/10/2024 47.7 (L)  >60.0 mL/min/1.73 Final    WBC 12/10/2024 8.00  3.40 - 10.80 10*3/mm3 Final    RBC 12/10/2024 4.41  3.77 - 5.28 10*6/mm3 Final    Hemoglobin 12/10/2024 13.1  12.0 - 15.9 g/dL Final    Hematocrit 12/10/2024 39.5  34.0 - 46.6 % Final    MCV 12/10/2024 89.6  79.0 - 97.0 fL Final    MCH 12/10/2024 29.7  26.6 - 33.0 pg Final    MCHC 12/10/2024  33.2  31.5 - 35.7 g/dL Final    RDW 12/10/2024 12.4  12.3 - 15.4 % Final    RDW-SD 12/10/2024 40.2  37.0 - 54.0 fl Final    MPV 12/10/2024 11.2  6.0 - 12.0 fL Final    Platelets 12/10/2024 409  140 - 450 10*3/mm3 Final   Lab on 12/02/2024   Component Date Value Ref Range Status    Microalbumin/Creatinine Ratio 12/02/2024 30.0 (H)  0.0 - 29.0 mg/g Final    Creatinine, Urine 12/02/2024 79.9  mg/dL Final    Microalbumin, Urine 12/02/2024 2.4  mg/dL Final    Urine Culture 12/02/2024 No growth   Final   Office Visit on 10/21/2024   Component Date Value Ref Range Status    Color 10/21/2024 Yellow  Yellow, Straw, Dark Yellow, Leslie Final    Clarity, UA 10/21/2024 Clear  Clear Final    Specific Gravity  10/21/2024 1.005  1.005 - 1.030 Final    pH, Urine 10/21/2024 6.0  5.0 - 8.0 Final    Leukocytes 10/21/2024 Negative  Negative Final    Nitrite, UA 10/21/2024 Negative  Negative Final    Protein, POC 10/21/2024 Negative  Negative mg/dL Final    Glucose, UA 10/21/2024 Negative  Negative mg/dL Final    Ketones, UA 10/21/2024 Negative  Negative Final    Urobilinogen, UA 10/21/2024 Normal  Normal, 0.2 E.U./dL Final    Bilirubin 10/21/2024 Negative  Negative Final    Blood, UA 10/21/2024 Negative  Negative Final    Lot Number 10/21/2024 [id number]   Final    Expiration Date 10/21/2024 04/11/2026   Final    Urine Culture 10/21/2024 Yeast isolated (A)   Final   Lab on 10/14/2024   Component Date Value Ref Range Status    Color, UA 10/14/2024 Yellow  Yellow, Straw Final    Appearance, UA 10/14/2024 Turbid (A)  Clear Final    pH, UA 10/14/2024 6.0  5.0 - 8.0 Final    Specific Gravity, UA 10/14/2024 1.011  1.005 - 1.030 Final    Glucose, UA 10/14/2024 Negative  Negative Final    Ketones, UA 10/14/2024 Negative  Negative Final    Bilirubin, UA 10/14/2024 Negative  Negative Final    Blood, UA 10/14/2024 Moderate (2+) (A)  Negative Final    Protein, UA 10/14/2024 30 mg/dL (1+) (A)  Negative Final    Leuk Esterase, UA 10/14/2024  Large (3+) (A)  Negative Final    Nitrite, UA 10/14/2024 Negative  Negative Final    Urobilinogen, UA 10/14/2024 0.2 E.U./dL  0.2 - 1.0 E.U./dL Final    Extra Tube 10/14/2024 Hold for add-ons.   Final    Auto resulted.    RBC, UA 10/14/2024 6-10 (A)  None Seen, 0-2 /HPF Final    WBC, UA 10/14/2024 Too Numerous to Count (A)  None Seen, 0-2 /HPF Final    Bacteria, UA 10/14/2024 None Seen  None Seen /HPF Final    Squamous Epithelial Cells, UA 10/14/2024 13-20 (A)  None Seen, 0-2 /HPF Final    Hyaline Casts, UA 10/14/2024 13-20  None Seen /LPF Final    Methodology 10/14/2024 Automated Microscopy   Final    Urine Culture 10/14/2024 25,000 CFU/mL Escherichia coli (A)   Final   Office Visit on 09/30/2024   Component Date Value Ref Range Status    Color 09/30/2024 Dark Yellow  Yellow, Straw, Dark Yellow, Leslie Final    Clarity, UA 09/30/2024 Clear  Clear Final    Specific Gravity  09/30/2024 1.005  1.005 - 1.030 Final    pH, Urine 09/30/2024 6.0  5.0 - 8.0 Final    Leukocytes 09/30/2024 Negative  Negative Final    Nitrite, UA 09/30/2024 Negative  Negative Final    Protein, POC 09/30/2024 Negative  Negative mg/dL Final    Glucose, UA 09/30/2024 Negative  Negative mg/dL Final    Ketones, UA 09/30/2024 Negative  Negative Final    Urobilinogen, UA 09/30/2024 Normal  Normal, 0.2 E.U./dL Final    Bilirubin 09/30/2024 Negative  Negative Final    Blood, UA 09/30/2024 Negative  Negative Final    Lot Number 09/30/2024 N   Final    Expiration Date 09/30/2024 N   Final    Urine Culture 09/30/2024 No growth   Final      Follow Up   Return in about 6 months (around 6/12/2025) for Next scheduled follow up, With Dr. Wynn, RECURRENT UTI/DYSURIA/DETRUSSOR INSTABILITY.    Patient was given instructions and counseling regarding her condition or for health maintenance advice. Please see specific information pulled into the AVS if appropriate.          This document has been electronically signed by Griselda Cheng-Akwa, APRN   December 12,  2024 01:10 EST      Dictated Utilizing Dragon Dictation: Part of this note may be an electronic transcription/translation of spoken language to printed text using the Dragon Dictation System.      Patient or patient representative verbalized consent for the use of Ambient Listening during the visit with  Griselda Cheng-Akwa, APRN for chart documentation. 12/12/2024  21:26 EDT

## 2024-12-11 ENCOUNTER — TELEPHONE (OUTPATIENT)
Dept: UROLOGY | Facility: CLINIC | Age: 79
End: 2024-12-11
Payer: MEDICARE

## 2024-12-11 LAB
ALBUMIN SERPL-MCNC: 4.2 G/DL (ref 3.5–5.2)
ALBUMIN/GLOB SERPL: 1.4 G/DL
ALP SERPL-CCNC: 97 U/L (ref 39–117)
ALT SERPL W P-5'-P-CCNC: 10 U/L (ref 1–33)
ANION GAP SERPL CALCULATED.3IONS-SCNC: 12.9 MMOL/L (ref 5–15)
AST SERPL-CCNC: 17 U/L (ref 1–32)
BILIRUB SERPL-MCNC: 0.4 MG/DL (ref 0–1.2)
BUN SERPL-MCNC: 19 MG/DL (ref 8–23)
BUN/CREAT SERPL: 16.1 (ref 7–25)
CALCIUM SPEC-SCNC: 9.5 MG/DL (ref 8.6–10.5)
CHLORIDE SERPL-SCNC: 104 MMOL/L (ref 98–107)
CO2 SERPL-SCNC: 21.1 MMOL/L (ref 22–29)
CREAT SERPL-MCNC: 1.18 MG/DL (ref 0.57–1)
DEPRECATED RDW RBC AUTO: 40.2 FL (ref 37–54)
EGFRCR SERPLBLD CKD-EPI 2021: 47.7 ML/MIN/1.73
ERYTHROCYTE [DISTWIDTH] IN BLOOD BY AUTOMATED COUNT: 12.4 % (ref 12.3–15.4)
GLOBULIN UR ELPH-MCNC: 3 GM/DL
GLUCOSE SERPL-MCNC: 96 MG/DL (ref 65–99)
HCT VFR BLD AUTO: 39.5 % (ref 34–46.6)
HGB BLD-MCNC: 13.1 G/DL (ref 12–15.9)
MCH RBC QN AUTO: 29.7 PG (ref 26.6–33)
MCHC RBC AUTO-ENTMCNC: 33.2 G/DL (ref 31.5–35.7)
MCV RBC AUTO: 89.6 FL (ref 79–97)
PLATELET # BLD AUTO: 409 10*3/MM3 (ref 140–450)
PMV BLD AUTO: 11.2 FL (ref 6–12)
POTASSIUM SERPL-SCNC: 4.4 MMOL/L (ref 3.5–5.2)
PROT SERPL-MCNC: 7.2 G/DL (ref 6–8.5)
RBC # BLD AUTO: 4.41 10*6/MM3 (ref 3.77–5.28)
SODIUM SERPL-SCNC: 138 MMOL/L (ref 136–145)
WBC NRBC COR # BLD AUTO: 8 10*3/MM3 (ref 3.4–10.8)

## 2024-12-11 NOTE — TELEPHONE ENCOUNTER
I called the pt and let her know her labs results    ----- Message from Griselda Cheng-Akwa sent at 12/11/2024 12:14 PM EST -----  Please kindly let patient know her urine culture results are negative for any bacterial infection at this time.    Also review her CMP labs with her creatinine 1.18, BUN of 19, with an EGFR of 47.7.  We discussed follow-up with nephrology for CKD stage III.    URINE CULTURE- NO GROWTH    However she may drop off another urine dip each time she feels symptomatic.    If not follow-up in clinic as discussed.    Thank you

## 2024-12-12 LAB — BACTERIA SPEC AEROBE CULT: NO GROWTH

## 2024-12-13 ENCOUNTER — OFFICE VISIT (OUTPATIENT)
Dept: FAMILY MEDICINE CLINIC | Facility: CLINIC | Age: 79
End: 2024-12-13
Payer: MEDICARE

## 2024-12-13 DIAGNOSIS — I70.0 ABDOMINAL AORTIC ATHEROSCLEROSIS: ICD-10-CM

## 2024-12-13 DIAGNOSIS — K80.20 ASYMPTOMATIC CHOLELITHIASIS: ICD-10-CM

## 2024-12-13 DIAGNOSIS — E66.01 CLASS 2 SEVERE OBESITY WITH SERIOUS COMORBIDITY AND BODY MASS INDEX (BMI) OF 36.0 TO 36.9 IN ADULT, UNSPECIFIED OBESITY TYPE: ICD-10-CM

## 2024-12-13 DIAGNOSIS — E66.812 CLASS 2 SEVERE OBESITY WITH SERIOUS COMORBIDITY AND BODY MASS INDEX (BMI) OF 36.0 TO 36.9 IN ADULT, UNSPECIFIED OBESITY TYPE: ICD-10-CM

## 2024-12-13 DIAGNOSIS — N13.30 HYDRONEPHROSIS OF RIGHT KIDNEY: ICD-10-CM

## 2024-12-13 DIAGNOSIS — N95.1 MENOPAUSAL SYMPTOM: ICD-10-CM

## 2024-12-13 DIAGNOSIS — Z85.828 HISTORY OF NONMELANOMA SKIN CANCER: ICD-10-CM

## 2024-12-13 DIAGNOSIS — Z00.00 HEALTHCARE MAINTENANCE: ICD-10-CM

## 2024-12-13 DIAGNOSIS — F33.41 RECURRENT MAJOR DEPRESSIVE DISORDER, IN PARTIAL REMISSION: ICD-10-CM

## 2024-12-13 DIAGNOSIS — E78.2 MIXED HYPERLIPIDEMIA: ICD-10-CM

## 2024-12-13 DIAGNOSIS — I47.10 PAROXYSMAL SVT (SUPRAVENTRICULAR TACHYCARDIA): Primary | ICD-10-CM

## 2024-12-13 DIAGNOSIS — Z23 ENCOUNTER FOR IMMUNIZATION: ICD-10-CM

## 2024-12-13 DIAGNOSIS — K59.04 CHRONIC IDIOPATHIC CONSTIPATION: ICD-10-CM

## 2024-12-13 DIAGNOSIS — E55.9 VITAMIN D DEFICIENCY: ICD-10-CM

## 2024-12-13 DIAGNOSIS — E03.4 HYPOTHYROIDISM DUE TO ACQUIRED ATROPHY OF THYROID: ICD-10-CM

## 2024-12-13 DIAGNOSIS — N18.31 STAGE 3A CHRONIC KIDNEY DISEASE: ICD-10-CM

## 2024-12-13 DIAGNOSIS — I10 ESSENTIAL HYPERTENSION: ICD-10-CM

## 2024-12-13 DIAGNOSIS — I25.10 CORONARY ARTERY CALCIFICATION: ICD-10-CM

## 2024-12-13 DIAGNOSIS — M81.0 AGE-RELATED OSTEOPOROSIS WITHOUT CURRENT PATHOLOGICAL FRACTURE: ICD-10-CM

## 2024-12-13 NOTE — PROGRESS NOTES
Subjective   Tennille Gasca is a 79 y.o. female.     Chief Complaint  She returns for a scheduled reassessment of multiple medical problems including recurrent urinary tract infections, chronic renal failure, essential hypertension, hypothyroidism, and depression    History of Present Illness     Recurrent Urinary Tract Infections  She denies any dysuria, frequency, nocturia, urgency, hematuria, vaginal discharge or bleeding at present. Ultrasound of the abdomen performed on 9/18/2024 revealed mild right hydronephrosis and she was referred to urology.  She established care with Griselda Cheng-Akwa APRN on 9/30/2024 and a noncontrast CT of the abdomen was performed on 10/14/2024 with evidence of coronary artery calcifications, cardiomegaly, large hiatal hernia, cholelithiasis, aortic atherosclerosis, degenerative changes of the spine, but no evidence of hydronephrosis.    Chronic Renal Failure  She denies any dysuria, hematuria, nausea, edema, pruritus, or weight loss.  She has a long history of hypertension as well as recurrent urinary tract infections.  She denies any NSAID use.  Hepatitis B and C serology drawn with her recent labs was negative.      Essential Hypertension  She has remained fairly active with a continued improvement in her shortness of breath.  There is no history of any orthopnea or PND, and she continues to deny any chest pain, palpitations lightheadedness, or swelling of the ankles.  She remains on losartan 50 nightly with no apparent side effects.  48-hour Holter monitoring completed on 3/1/2023 revealed 2 short 8-14 beat runs of SVT (possible atrial flutter).  Echocardiogram performed on 3/19/2023 was reported as showing grade 1 diastolic dysfunction, sclerosis of the aortic valve with mild stenosis, mild MR and TR, but normal systolic function with an estimated EF of 61 to 65%.  Stress testing performed the same day revealed no evidence of inducible ischemia    Hypothyroidism  She admits to  fatigue, and struggles with her weight. Her hypothyroidism is due to Hashimoto's disease. She remains on levothyroxine 112 daily.     Depression  She has a long history of intermittent nervousness, and worrying.  There is no history of any depression at present and she continues to deny any anhedonia, difficulty concentrating, impaired memory,  or suicidal thoughts.  With melatonin she continues to average 7-8 hours of sleep nightly.    Imaging  DEXA scan performed on 9/19/2024 returned with a T-score as low as -2.6 at the left femoral neck    The following portions of the patient's history were reviewed and updated as appropriate: allergies, current medications, past medical history, past social history, and problem list.    Review of Systems   Constitutional:  Positive for fatigue. Negative for chills and fever.   HENT:  Negative for congestion, ear pain, hearing loss, postnasal drip, rhinorrhea, sinus pressure, sneezing and sore throat.    Eyes:  Negative for visual disturbance.   Respiratory:  Negative for cough, shortness of breath and wheezing.    Cardiovascular:  Negative for chest pain, palpitations and leg swelling.   Gastrointestinal:  Negative for abdominal pain, blood in stool, constipation, diarrhea, nausea, vomiting and GERD.   Genitourinary:  Negative for dysuria, flank pain, frequency, hematuria, pelvic pain, urgency, urinary incontinence and vaginal pain.   Musculoskeletal:  Positive for arthralgias. Negative for back pain, joint swelling and myalgias.   Skin:  Negative for rash.   Neurological:  Negative for weakness and numbness.        Two spells several weeks a few days apart each last 4-5 seconds during which she had difficulty initiating movement.  She denies any actual weakness numbness or tingling, and there is no history of any visual disturbances or difficulty talking or understanding what is said to her   Psychiatric/Behavioral:  Negative for decreased concentration, hallucinations, sleep  disturbance and depressed mood. The patient is nervous/anxious.      Objective   Physical Exam  Constitutional:       General: She is not in acute distress.     Appearance: Normal appearance. She is well-developed. She is not diaphoretic.      Comments: Bright and in good spirits. No apparent distress. No pallor, jaundice, diaphoresis, or cyanosis.   HENT:      Head: Atraumatic.      Right Ear: Tympanic membrane, ear canal and external ear normal.      Left Ear: Tympanic membrane, ear canal and external ear normal.      Mouth/Throat:      Lips: No lesions.      Mouth: Mucous membranes are moist. No oral lesions.      Pharynx: No oropharyngeal exudate or posterior oropharyngeal erythema.   Eyes:      General: Lids are normal.      Extraocular Movements: Extraocular movements intact.      Conjunctiva/sclera: Conjunctivae normal.      Pupils: Pupils are equal.   Neck:      Thyroid: No thyroid mass or thyromegaly.      Vascular: No carotid bruit or JVD.      Trachea: Trachea normal. No tracheal deviation.   Cardiovascular:      Rate and Rhythm: Normal rate and regular rhythm.      Heart sounds: Normal heart sounds, S1 normal and S2 normal. No murmur heard.     No gallop.   Pulmonary:      Effort: Pulmonary effort is normal.      Breath sounds: Normal breath sounds.   Abdominal:      General: Bowel sounds are normal. There is no distension.   Musculoskeletal:      Right lower leg: No edema.      Left lower leg: No edema.   Lymphadenopathy:      Head:      Right side of head: No submental, submandibular, tonsillar, preauricular, posterior auricular or occipital adenopathy.      Left side of head: No submental, submandibular, tonsillar, preauricular, posterior auricular or occipital adenopathy.      Cervical: No cervical adenopathy.      Upper Body:      Right upper body: No supraclavicular adenopathy.      Left upper body: No supraclavicular adenopathy.   Skin:     General: Skin is warm.      Coloration: Skin is not  cyanotic, jaundiced or pale.      Findings: No rash.      Nails: There is no clubbing.   Neurological:      Mental Status: She is alert and oriented to person, place, and time.      Cranial Nerves: No cranial nerve deficit, dysarthria or facial asymmetry.      Sensory: No sensory deficit.      Motor: No tremor.      Coordination: Coordination normal.      Gait: Gait normal.   Psychiatric:         Attention and Perception: Attention normal.         Mood and Affect: Mood normal.         Speech: Speech normal.         Behavior: Behavior normal.         Thought Content: Thought content normal.       Assessment & Plan   Problems Addressed this Visit          Cardiac and Vasculature    Abdominal aortic atherosclerosis  Reminded regarding the importance of risk factor modification.  Advised to start on low-dose ASA    Relevant Medications    aspirin 81 MG EC tablet    Coronary artery calcification  As above.    Relevant Medications    aspirin 81 MG EC tablet    Essential hypertension   Hypertension: elevated today. Evidence of target organ damage: chronic kidney disease and evidence of aortic atherosclerosis and coronary artery calcifications on imaging .  Encouraged to continue to work on diet and exercise plan.   Advised to increase losartan to 75 daily    Relevant Medications    losartan (COZAAR) 50 MG tablet    Mixed hyperlipidemia  As above.  Will discuss the potential benefits of lipid-lowering therapy at her return    Paroxysmal SVT (supraventricular tachycardia)        Endocrine and Metabolic    Class 2 severe obesity with serious comorbidity and body mass index (BMI) of 36.0 to 36.9 in adult    Hypothyroidism due to acquired atrophy of thyroid  Clinically euthyroid.  Continue current medication.    Vitamin D deficiency       Gastrointestinal Abdominal     Asymptomatic cholelithiasis    Chronic idiopathic constipation       Genitourinary and Reproductive     Hydronephrosis of right kidney  Transient  Follow up with  urology    Menopausal symptom    Stage 3a chronic kidney disease  Reminded to avoid any NSAIDs prescription or OTC  Will continue to monitor.  Scheduled for updated labs prior to her return in 3 months  Will discuss the potential benefits of an SGLT2 inhibitor at that time       Health Encounters    Healthcare maintenance  Flu shot administered.  Recommended RSV and an updated COVID-19 shot  Reminded that she is due for Shingrix    Relevant Orders    Fluzone High-Dose 65+yrs (Completed)       Hematology and Neoplasia    History of nonmelanoma skin cancer       Infectious Diseases    Encounter for immunization    Relevant Orders    Fluzone High-Dose 65+yrs (Completed)       Mental Health    Recurrent major depressive disorder, in partial remission  Stable.  Supportive therapy.   Continue current medication.   Encouraged report if any further spells of difficulty initiating movement or any other concerns whatsoever       Musculoskeletal and Injuries    Age-related osteoporosis without current pathological fracture     Diagnoses         Codes Comments    Paroxysmal SVT (supraventricular tachycardia)    -  Primary ICD-10-CM: I47.10  ICD-9-CM: 427.0     Essential hypertension     ICD-10-CM: I10  ICD-9-CM: 401.9     Vitamin D deficiency     ICD-10-CM: E55.9  ICD-9-CM: 268.9     Hypothyroidism due to acquired atrophy of thyroid     ICD-10-CM: E03.4  ICD-9-CM: 244.8, 246.8     Class 2 severe obesity with serious comorbidity and body mass index (BMI) of 36.0 to 36.9 in adult, unspecified obesity type     ICD-10-CM: E66.812, Z68.36, E66.01  ICD-9-CM: 278.01, V85.36     Chronic idiopathic constipation     ICD-10-CM: K59.04  ICD-9-CM: 564.00     Asymptomatic cholelithiasis     ICD-10-CM: K80.20  ICD-9-CM: 574.20     Stage 3a chronic kidney disease     ICD-10-CM: N18.31  ICD-9-CM: 585.3     Menopausal symptom     ICD-10-CM: N95.1  ICD-9-CM: 627.2     Hydronephrosis of right kidney     ICD-10-CM: N13.30  ICD-9-CM: 591      Healthcare maintenance     ICD-10-CM: Z00.00  ICD-9-CM: V70.0     History of nonmelanoma skin cancer     ICD-10-CM: Z85.828  ICD-9-CM: V10.83     Recurrent major depressive disorder, in partial remission     ICD-10-CM: F33.41  ICD-9-CM: 296.35     Age-related osteoporosis without current pathological fracture     ICD-10-CM: M81.0  ICD-9-CM: 733.01     Encounter for immunization     ICD-10-CM: Z23  ICD-9-CM: V03.89     Coronary artery calcification     ICD-10-CM: I25.10  ICD-9-CM: 414.00     Abdominal aortic atherosclerosis     ICD-10-CM: I70.0  ICD-9-CM: 440.0     Mixed hyperlipidemia     ICD-10-CM: E78.2  ICD-9-CM: 272.2

## 2024-12-14 VITALS
WEIGHT: 217 LBS | DIASTOLIC BLOOD PRESSURE: 80 MMHG | TEMPERATURE: 98.6 F | BODY MASS INDEX: 34.87 KG/M2 | OXYGEN SATURATION: 99 % | RESPIRATION RATE: 14 BRPM | HEART RATE: 93 BPM | SYSTOLIC BLOOD PRESSURE: 148 MMHG | HEIGHT: 66 IN

## 2024-12-14 PROBLEM — I25.10 CORONARY ARTERY CALCIFICATION: Status: ACTIVE | Noted: 2024-12-14

## 2024-12-14 PROBLEM — E78.2 MIXED HYPERLIPIDEMIA: Status: ACTIVE | Noted: 2024-12-14

## 2024-12-14 PROBLEM — N39.0 URINARY TRACT INFECTION WITHOUT HEMATURIA: Status: RESOLVED | Noted: 2023-09-18 | Resolved: 2024-12-14

## 2024-12-14 PROBLEM — R10.9 BILATERAL FLANK PAIN: Status: RESOLVED | Noted: 2024-09-30 | Resolved: 2024-12-14

## 2024-12-14 PROBLEM — R10.30 LOWER ABDOMINAL PAIN: Status: RESOLVED | Noted: 2024-10-24 | Resolved: 2024-12-14

## 2024-12-14 PROBLEM — I70.0 ABDOMINAL AORTIC ATHEROSCLEROSIS: Status: ACTIVE | Noted: 2024-12-14

## 2024-12-14 RX ORDER — ASPIRIN 81 MG/1
81 TABLET ORAL DAILY
Qty: 30 TABLET | Refills: 5 | Status: SHIPPED | OUTPATIENT
Start: 2024-12-14

## 2024-12-14 RX ORDER — LOSARTAN POTASSIUM 50 MG/1
75 TABLET ORAL DAILY
Qty: 45 TABLET | Refills: 5 | Status: SHIPPED | OUTPATIENT
Start: 2024-12-14

## 2024-12-16 ENCOUNTER — TELEPHONE (OUTPATIENT)
Dept: FAMILY MEDICINE CLINIC | Facility: CLINIC | Age: 79
End: 2024-12-16
Payer: MEDICARE

## 2024-12-16 NOTE — TELEPHONE ENCOUNTER
Pt is aware of this information.       ----- Message from Alfonso Rubi sent at 12/14/2024 11:28 AM EST -----  Please let patient know that I would like her to start on a baby aspirin (81 mg) 1 daily until I see her next

## 2025-03-06 DIAGNOSIS — E66.01 CLASS 2 SEVERE OBESITY WITH SERIOUS COMORBIDITY AND BODY MASS INDEX (BMI) OF 37.0 TO 37.9 IN ADULT, UNSPECIFIED OBESITY TYPE: ICD-10-CM

## 2025-03-06 DIAGNOSIS — E66.812 CLASS 2 SEVERE OBESITY WITH SERIOUS COMORBIDITY AND BODY MASS INDEX (BMI) OF 37.0 TO 37.9 IN ADULT, UNSPECIFIED OBESITY TYPE: ICD-10-CM

## 2025-03-06 RX ORDER — PHENTERMINE HYDROCHLORIDE 37.5 MG/1
37.5 CAPSULE ORAL EVERY MORNING
Qty: 30 CAPSULE | Refills: 0 | Status: SHIPPED | OUTPATIENT
Start: 2025-03-06

## 2025-03-06 NOTE — TELEPHONE ENCOUNTER
Caller: Elo Tennille S    Relationship: Self    Best call back number: [phone]     Requested Prescriptions:   Requested Prescriptions     Pending Prescriptions Disp Refills    phentermine 37.5 MG capsule [Pharmacy Med Name: phentermine 37.5 mg capsule] 30 capsule 5     Sig: TAKE ONE Capsule BY MOUTH EVERY MORNING        Pharmacy where request should be sent: [address] [place] [address] - [phone]  - [phone]     Last office visit with prescribing clinician: 12/13/2024   Last telemedicine visit with prescribing clinician: Visit date not found   Next office visit with prescribing clinician: 3/31/2025     Additional details provided by patient: TENNILLE SAYS SHE HS 3 PILLS LEFT.    Does the patient have less than a 3 day supply:  [x] Yes  [] No     Quan Borja Rep   03/06/25 15:12 EST

## 2025-03-27 ENCOUNTER — LAB (OUTPATIENT)
Dept: FAMILY MEDICINE CLINIC | Facility: CLINIC | Age: 80
End: 2025-03-27
Payer: MEDICARE

## 2025-03-27 DIAGNOSIS — I25.10 CORONARY ARTERY CALCIFICATION: ICD-10-CM

## 2025-03-27 DIAGNOSIS — E78.2 MIXED HYPERLIPIDEMIA: ICD-10-CM

## 2025-03-27 DIAGNOSIS — E55.9 VITAMIN D DEFICIENCY: ICD-10-CM

## 2025-03-27 DIAGNOSIS — N18.31 STAGE 3A CHRONIC KIDNEY DISEASE: ICD-10-CM

## 2025-03-27 DIAGNOSIS — I70.0 ABDOMINAL AORTIC ATHEROSCLEROSIS: ICD-10-CM

## 2025-03-27 DIAGNOSIS — E03.4 HYPOTHYROIDISM DUE TO ACQUIRED ATROPHY OF THYROID: ICD-10-CM

## 2025-03-27 DIAGNOSIS — I10 ESSENTIAL HYPERTENSION: ICD-10-CM

## 2025-03-27 LAB
ALBUMIN SERPL-MCNC: 4 G/DL (ref 3.5–5.2)
ALBUMIN/GLOB SERPL: 1.3 G/DL
ALP SERPL-CCNC: 99 U/L (ref 39–117)
ALT SERPL W P-5'-P-CCNC: 12 U/L (ref 1–33)
ANION GAP SERPL CALCULATED.3IONS-SCNC: 11.2 MMOL/L (ref 5–15)
AST SERPL-CCNC: 18 U/L (ref 1–32)
BASOPHILS # BLD AUTO: 0.15 10*3/MM3 (ref 0–0.2)
BASOPHILS NFR BLD AUTO: 2.1 % (ref 0–1.5)
BILIRUB SERPL-MCNC: 0.4 MG/DL (ref 0–1.2)
BUN SERPL-MCNC: 26 MG/DL (ref 8–23)
BUN/CREAT SERPL: 22.6 (ref 7–25)
CALCIUM SPEC-SCNC: 9.6 MG/DL (ref 8.6–10.5)
CHLORIDE SERPL-SCNC: 101 MMOL/L (ref 98–107)
CHOLEST SERPL-MCNC: 190 MG/DL (ref 0–200)
CO2 SERPL-SCNC: 25.8 MMOL/L (ref 22–29)
CREAT SERPL-MCNC: 1.15 MG/DL (ref 0.57–1)
DEPRECATED RDW RBC AUTO: 41.5 FL (ref 37–54)
EGFRCR SERPLBLD CKD-EPI 2021: 48.9 ML/MIN/1.73
EOSINOPHIL # BLD AUTO: 0.3 10*3/MM3 (ref 0–0.4)
EOSINOPHIL NFR BLD AUTO: 4.2 % (ref 0.3–6.2)
ERYTHROCYTE [DISTWIDTH] IN BLOOD BY AUTOMATED COUNT: 12.5 % (ref 12.3–15.4)
GLOBULIN UR ELPH-MCNC: 3.2 GM/DL
GLUCOSE SERPL-MCNC: 106 MG/DL (ref 65–99)
HCT VFR BLD AUTO: 38.2 % (ref 34–46.6)
HDLC SERPL-MCNC: 59 MG/DL (ref 40–60)
HGB BLD-MCNC: 12.5 G/DL (ref 12–15.9)
IMM GRANULOCYTES # BLD AUTO: 0.03 10*3/MM3 (ref 0–0.05)
IMM GRANULOCYTES NFR BLD AUTO: 0.4 % (ref 0–0.5)
LDLC SERPL CALC-MCNC: 118 MG/DL (ref 0–100)
LDLC/HDLC SERPL: 1.97 {RATIO}
LYMPHOCYTES # BLD AUTO: 1.67 10*3/MM3 (ref 0.7–3.1)
LYMPHOCYTES NFR BLD AUTO: 23.5 % (ref 19.6–45.3)
MCH RBC QN AUTO: 29.8 PG (ref 26.6–33)
MCHC RBC AUTO-ENTMCNC: 32.7 G/DL (ref 31.5–35.7)
MCV RBC AUTO: 91.2 FL (ref 79–97)
MONOCYTES # BLD AUTO: 0.6 10*3/MM3 (ref 0.1–0.9)
MONOCYTES NFR BLD AUTO: 8.5 % (ref 5–12)
NEUTROPHILS NFR BLD AUTO: 4.35 10*3/MM3 (ref 1.7–7)
NEUTROPHILS NFR BLD AUTO: 61.3 % (ref 42.7–76)
NRBC BLD AUTO-RTO: 0 /100 WBC (ref 0–0.2)
PLATELET # BLD AUTO: 406 10*3/MM3 (ref 140–450)
PMV BLD AUTO: 10.8 FL (ref 6–12)
POTASSIUM SERPL-SCNC: 5 MMOL/L (ref 3.5–5.2)
PROT SERPL-MCNC: 7.2 G/DL (ref 6–8.5)
RBC # BLD AUTO: 4.19 10*6/MM3 (ref 3.77–5.28)
SODIUM SERPL-SCNC: 138 MMOL/L (ref 136–145)
TRIGL SERPL-MCNC: 73 MG/DL (ref 0–150)
TSH SERPL DL<=0.05 MIU/L-ACNC: 6.77 UIU/ML (ref 0.27–4.2)
VLDLC SERPL-MCNC: 13 MG/DL (ref 5–40)
WBC NRBC COR # BLD AUTO: 7.1 10*3/MM3 (ref 3.4–10.8)

## 2025-03-27 PROCEDURE — 82306 VITAMIN D 25 HYDROXY: CPT | Performed by: GENERAL PRACTICE

## 2025-03-27 PROCEDURE — 85025 COMPLETE CBC W/AUTO DIFF WBC: CPT | Performed by: GENERAL PRACTICE

## 2025-03-27 PROCEDURE — 80053 COMPREHEN METABOLIC PANEL: CPT | Performed by: GENERAL PRACTICE

## 2025-03-27 PROCEDURE — 82043 UR ALBUMIN QUANTITATIVE: CPT | Performed by: GENERAL PRACTICE

## 2025-03-27 PROCEDURE — 36415 COLL VENOUS BLD VENIPUNCTURE: CPT

## 2025-03-27 PROCEDURE — 84443 ASSAY THYROID STIM HORMONE: CPT | Performed by: GENERAL PRACTICE

## 2025-03-27 PROCEDURE — 80061 LIPID PANEL: CPT | Performed by: GENERAL PRACTICE

## 2025-03-28 LAB
25(OH)D3 SERPL-MCNC: 72 NG/ML (ref 30–100)
ALBUMIN UR-MCNC: <1.2 MG/DL

## 2025-03-31 ENCOUNTER — OFFICE VISIT (OUTPATIENT)
Dept: FAMILY MEDICINE CLINIC | Facility: CLINIC | Age: 80
End: 2025-03-31
Payer: MEDICARE

## 2025-03-31 VITALS
WEIGHT: 215 LBS | DIASTOLIC BLOOD PRESSURE: 80 MMHG | TEMPERATURE: 97.8 F | OXYGEN SATURATION: 98 % | RESPIRATION RATE: 14 BRPM | SYSTOLIC BLOOD PRESSURE: 138 MMHG | HEART RATE: 76 BPM | BODY MASS INDEX: 34.55 KG/M2 | HEIGHT: 66 IN

## 2025-03-31 DIAGNOSIS — I70.0 ABDOMINAL AORTIC ATHEROSCLEROSIS: ICD-10-CM

## 2025-03-31 DIAGNOSIS — M81.0 AGE-RELATED OSTEOPOROSIS WITHOUT CURRENT PATHOLOGICAL FRACTURE: ICD-10-CM

## 2025-03-31 DIAGNOSIS — E66.812 CLASS 2 SEVERE OBESITY WITH SERIOUS COMORBIDITY AND BODY MASS INDEX (BMI) OF 37.0 TO 37.9 IN ADULT, UNSPECIFIED OBESITY TYPE: ICD-10-CM

## 2025-03-31 DIAGNOSIS — M25.561 CHRONIC PAIN OF BOTH KNEES: ICD-10-CM

## 2025-03-31 DIAGNOSIS — I47.10 PAROXYSMAL SVT (SUPRAVENTRICULAR TACHYCARDIA): Primary | ICD-10-CM

## 2025-03-31 DIAGNOSIS — N95.2 ATROPHIC VAGINITIS: ICD-10-CM

## 2025-03-31 DIAGNOSIS — K59.04 CHRONIC IDIOPATHIC CONSTIPATION: ICD-10-CM

## 2025-03-31 DIAGNOSIS — N95.1 MENOPAUSAL SYMPTOM: ICD-10-CM

## 2025-03-31 DIAGNOSIS — K80.20 ASYMPTOMATIC CHOLELITHIASIS: ICD-10-CM

## 2025-03-31 DIAGNOSIS — M25.562 CHRONIC PAIN OF BOTH KNEES: ICD-10-CM

## 2025-03-31 DIAGNOSIS — E66.811 CLASS 1 OBESITY WITH SERIOUS COMORBIDITY AND BODY MASS INDEX (BMI) OF 34.0 TO 34.9 IN ADULT, UNSPECIFIED OBESITY TYPE: ICD-10-CM

## 2025-03-31 DIAGNOSIS — N18.31 STAGE 3A CHRONIC KIDNEY DISEASE: ICD-10-CM

## 2025-03-31 DIAGNOSIS — E78.2 MIXED HYPERLIPIDEMIA: ICD-10-CM

## 2025-03-31 DIAGNOSIS — G89.29 CHRONIC PAIN OF BOTH KNEES: ICD-10-CM

## 2025-03-31 DIAGNOSIS — I25.10 CORONARY ARTERY CALCIFICATION: ICD-10-CM

## 2025-03-31 DIAGNOSIS — N13.30 HYDRONEPHROSIS OF RIGHT KIDNEY: ICD-10-CM

## 2025-03-31 DIAGNOSIS — E03.4 HYPOTHYROIDISM DUE TO ACQUIRED ATROPHY OF THYROID: ICD-10-CM

## 2025-03-31 DIAGNOSIS — F33.41 RECURRENT MAJOR DEPRESSIVE DISORDER, IN PARTIAL REMISSION: ICD-10-CM

## 2025-03-31 DIAGNOSIS — I10 ESSENTIAL HYPERTENSION: ICD-10-CM

## 2025-03-31 DIAGNOSIS — E55.9 VITAMIN D DEFICIENCY: ICD-10-CM

## 2025-03-31 DIAGNOSIS — E66.01 CLASS 2 SEVERE OBESITY WITH SERIOUS COMORBIDITY AND BODY MASS INDEX (BMI) OF 37.0 TO 37.9 IN ADULT, UNSPECIFIED OBESITY TYPE: ICD-10-CM

## 2025-03-31 DIAGNOSIS — R73.9 HYPERGLYCEMIA: ICD-10-CM

## 2025-03-31 DIAGNOSIS — Z85.828 HISTORY OF NONMELANOMA SKIN CANCER: ICD-10-CM

## 2025-03-31 DIAGNOSIS — Z00.00 HEALTHCARE MAINTENANCE: ICD-10-CM

## 2025-03-31 RX ORDER — AMOXICILLIN 250 MG
2 CAPSULE ORAL DAILY
Qty: 180 TABLET | Refills: 3 | Status: SHIPPED | OUTPATIENT
Start: 2025-03-31

## 2025-03-31 RX ORDER — ATORVASTATIN CALCIUM 20 MG/1
20 TABLET, FILM COATED ORAL NIGHTLY
Qty: 30 TABLET | Refills: 5 | Status: SHIPPED | OUTPATIENT
Start: 2025-03-31

## 2025-03-31 RX ORDER — PHENTERMINE HYDROCHLORIDE 37.5 MG/1
37.5 CAPSULE ORAL EVERY MORNING
Qty: 30 CAPSULE | Refills: 3 | Status: SHIPPED | OUTPATIENT
Start: 2025-03-31

## 2025-03-31 NOTE — PROGRESS NOTES
Subjective   Tennille Gasca is a 80 y.o. female.     Chief Complaint  She returns for a scheduled reassessment of multiple medical problems including recurrent urinary tract infections, chronic renal failure, essential hypertension, hypothyroidism, depression, and osteoporosis    History of Present Illness     Recurrent Urinary Tract Infections  She denies any dysuria, frequency, nocturia, urgency, hematuria, vaginal discharge or bleeding. Ultrasound of the abdomen performed on 9/18/2024 revealed mild right hydronephrosis and she was referred to urology.  She established urology care with Griselda Cheng-Akwa APRN on 9/30/2024 and a noncontrast CT of the abdomen was performed on 10/14/2024 with evidence of coronary artery calcifications, cardiomegaly, large hiatal hernia, cholelithiasis, aortic atherosclerosis, degenerative changes of the spine, but no evidence of hydronephrosis.  She was prescribed sennoside-docusate 2 daily and at present is doing well.  She is scheduled to undergo a reassessment with Dr. Haider on 6/12/2025    Chronic Renal Failure  She denies any dysuria, hematuria, nausea, edema, pruritus, or weight loss.  She has a long history of hypertension as well as recurrent urinary tract infections.  She denies any NSAID use. Recent hepatitis B and C serology was negative  Lab Results   Component Value Date    GLUCOSE 106 (H) 03/27/2025    BUN 26 (H) 03/27/2025    CREATININE 1.15 (H) 03/27/2025     03/27/2025    K 5.0 03/27/2025     03/27/2025    CALCIUM 9.6 03/27/2025    PROTEINTOT 7.2 03/27/2025    ALBUMIN 4.0 03/27/2025    ALT 12 03/27/2025    AST 18 03/27/2025    ALKPHOS 99 03/27/2025    BILITOT 0.4 03/27/2025    GLOB 3.2 03/27/2025    AGRATIO 1.3 03/27/2025    BCR 22.6 03/27/2025    ANIONGAP 11.2 03/27/2025    EGFR 48.9 (L) 03/27/2025     Lab Results   Component Value Date    CHOL 190 03/27/2025    CHLPL 197 03/17/2016    TRIG 73 03/27/2025    HDL 59 03/27/2025     (H) 03/27/2025      Essential Hypertension  She has remained fairly active with a continued improvement in her shortness of breath.  There is no history of any orthopnea or PND, and she continues to deny any chest pain, palpitations lightheadedness, or swelling of the ankles.  She remains on losartan 50 nightly with no apparent side effects.  48-hour Holter monitoring completed on 3/1/2023 revealed 2 short 8-14 beat runs of SVT (possible atrial flutter).  Echocardiogram performed on 3/19/2023 was reported as showing grade 1 diastolic dysfunction, sclerosis of the aortic valve with mild stenosis, mild MR and TR, but normal systolic function with an estimated EF of 61 to 65%.  Stress testing performed the same day revealed no evidence of inducible ischemia  Lab Results   Component Value Date    WBC 7.10 03/27/2025    HGB 12.5 03/27/2025    HCT 38.2 03/27/2025    MCV 91.2 03/27/2025     03/27/2025     Hypothyroidism  She admits to fatigue, and struggles with her weight. Her hypothyroidism is due to Hashimoto's disease. She remains on levothyroxine 112 daily.   Lab Results   Component Value Date    TSH 6.770 (H) 03/27/2025     Depression  She has a long history of intermittent nervousness, and worrying.  There is no history of any depression at present and she continues to deny any anhedonia, difficulty concentrating, impaired memory,  or suicidal thoughts.  With melatonin she continues to average 7-8 hours of sleep nightly.    History of process  DEXA scan performed on 9/19/2024 returned with a T-score as low as -2.6 at the left femoral neck. Most recent vitamin D 72    The following portions of the patient's history were reviewed and updated as appropriate: allergies, current medications, past medical history, past social history, and problem list.    Review of Systems   Constitutional:  Positive for fatigue. Negative for chills and fever.   HENT:  Negative for congestion, ear pain, hearing loss, postnasal drip, rhinorrhea,  sinus pressure, sneezing and sore throat.    Eyes:  Negative for visual disturbance.   Respiratory:  Negative for cough, shortness of breath and wheezing.    Cardiovascular:  Negative for chest pain, palpitations and leg swelling.   Gastrointestinal:  Negative for abdominal pain, blood in stool, constipation, diarrhea, nausea, vomiting and GERD.   Genitourinary:  Negative for dysuria, flank pain, frequency, hematuria, pelvic pain, urgency, urinary incontinence and vaginal pain.   Musculoskeletal:  Positive for arthralgias. Negative for back pain, joint swelling and myalgias.   Skin:  Negative for rash.   Neurological:  Negative for weakness and numbness.   Psychiatric/Behavioral:  Negative for decreased concentration, hallucinations, sleep disturbance and depressed mood. The patient is nervous/anxious.      Objective   Physical Exam  Constitutional:       General: She is not in acute distress.     Appearance: Normal appearance. She is well-developed. She is not diaphoretic.      Comments: Bright and in good spirits. No apparent distress. No pallor, jaundice, diaphoresis, or cyanosis.   HENT:      Head: Atraumatic.      Right Ear: Tympanic membrane, ear canal and external ear normal.      Left Ear: Tympanic membrane, ear canal and external ear normal.      Mouth/Throat:      Lips: No lesions.      Mouth: Mucous membranes are moist. No oral lesions.      Pharynx: No oropharyngeal exudate or posterior oropharyngeal erythema.   Eyes:      General: Lids are normal.      Extraocular Movements: Extraocular movements intact.      Conjunctiva/sclera: Conjunctivae normal.      Pupils: Pupils are equal.   Neck:      Thyroid: No thyroid mass or thyromegaly.      Vascular: No carotid bruit or JVD.      Trachea: Trachea normal. No tracheal deviation.   Cardiovascular:      Rate and Rhythm: Normal rate and regular rhythm.      Heart sounds: Normal heart sounds, S1 normal and S2 normal. No murmur heard.     No gallop.   Pulmonary:       Effort: Pulmonary effort is normal.      Breath sounds: Normal breath sounds.   Abdominal:      General: Bowel sounds are normal. There is no distension.   Musculoskeletal:      Right lower leg: No edema.      Left lower leg: No edema.   Lymphadenopathy:      Head:      Right side of head: No submental, submandibular, tonsillar, preauricular, posterior auricular or occipital adenopathy.      Left side of head: No submental, submandibular, tonsillar, preauricular, posterior auricular or occipital adenopathy.      Cervical: No cervical adenopathy.      Upper Body:      Right upper body: No supraclavicular adenopathy.      Left upper body: No supraclavicular adenopathy.   Skin:     General: Skin is warm.      Coloration: Skin is not cyanotic, jaundiced or pale.      Findings: No rash.      Nails: There is no clubbing.   Neurological:      Mental Status: She is alert and oriented to person, place, and time.      Cranial Nerves: No cranial nerve deficit, dysarthria or facial asymmetry.      Sensory: No sensory deficit.      Motor: No tremor.      Coordination: Coordination normal.      Gait: Gait normal.   Psychiatric:         Attention and Perception: Attention normal.         Mood and Affect: Mood normal.         Speech: Speech normal.         Behavior: Behavior normal.         Thought Content: Thought content normal.       Assessment & Plan   Problems Addressed this Visit          Cardiac and Vasculature    Abdominal aortic atherosclerosis  Reminded regarding the importance of risk factor modification.  Continue low-dose ASA.    Coronary artery calcification  As above.    Relevant Orders    CBC & Differential    Essential hypertension   Hypertension: marginal. Evidence of target organ damage: chronic kidney disease and evidence of aortic atherosclerosis and coronary artery calcifications on previous imaging .  Encouraged to continue to work on diet and exercise plan.   Continue current medication    Relevant  Orders    CBC & Differential    Comprehensive Metabolic Panel    Hemoglobin A1c    Mixed hyperlipidemia  As above.  Reviewed the potential benefits of statin therapy and agreed on a trial of atorvastatin.  Advise regarding potential side effects  Scheduled for updated labs just prior to her return in 3 months    Relevant Medications    atorvastatin (LIPITOR) 20 MG tablet    Other Relevant Orders    Comprehensive Metabolic Panel    Lipid Panel    Hemoglobin A1c    Paroxysmal SVT (supraventricular tachycardia)     Relevant Orders    CBC & Differential       Endocrine and Metabolic    Class 1 obesity with serious comorbidity and body mass index (BMI) of 34.0 to 34.9 in adult    Relevant Medications    phentermine 37.5 MG capsule    Hypothyroidism due to acquired atrophy of thyroid  Clinically euthyroid.  Continue current medication.  Will continue to monitor    Relevant Orders    TSH    Vitamin D deficiency       Gastrointestinal Abdominal     Asymptomatic cholelithiasis    Chronic idiopathic constipation  Continue current medication  Encouraged to report if any worse or if any new symptoms or concerns.    Relevant Medications    sennosides-docusate (Senna Plus) 8.6-50 MG per tablet       Genitourinary and Reproductive     Atrophic vaginitis    Hydronephrosis of right kidney  Resolved on most recent ultrasound    Menopausal symptom    Stage 3a chronic kidney disease  Reminded to avoid any NSAIDs prescription or OTC  We will discuss the potential benefits of a SGLT2 inhibitor at her return    Relevant Orders    CBC & Differential    Comprehensive Metabolic Panel       Health Encounters    Healthcare maintenance  Recommended an updated COVID-19 shot       Hematology and Neoplasia    History of nonmelanoma skin cancer       Mental Health    Recurrent major depressive disorder, in partial remission    Stable.  Supportive therapy.   Encouraged to report if any worse or if any new symptoms or concerns.            Musculoskeletal and Injuries    Age-related osteoporosis without current pathological fracture  Encouraged to continue to pursue weight bearing activities while exercising joint protection.    Bilateral knee pain     Diagnoses         Codes Comments      Paroxysmal SVT (supraventricular tachycardia)    -  Primary ICD-10-CM: I47.10  ICD-9-CM: 427.0       Mixed hyperlipidemia     ICD-10-CM: E78.2  ICD-9-CM: 272.2       Essential hypertension     ICD-10-CM: I10  ICD-9-CM: 401.9       Coronary artery calcification     ICD-10-CM: I25.10  ICD-9-CM: 414.00       Abdominal aortic atherosclerosis     ICD-10-CM: I70.0  ICD-9-CM: 440.0       Vitamin D deficiency     ICD-10-CM: E55.9  ICD-9-CM: 268.9       Hypothyroidism due to acquired atrophy of thyroid     ICD-10-CM: E03.4  ICD-9-CM: 244.8, 246.8       Class 1 obesity with serious comorbidity and body mass index (BMI) of 34.0 to 34.9 in adult, unspecified obesity type     ICD-10-CM: E66.811, Z68.34  ICD-9-CM: 278.00, V85.34       Chronic idiopathic constipation     ICD-10-CM: K59.04  ICD-9-CM: 564.00       Asymptomatic cholelithiasis     ICD-10-CM: K80.20  ICD-9-CM: 574.20       Stage 3a chronic kidney disease     ICD-10-CM: N18.31  ICD-9-CM: 585.3       Menopausal symptom     ICD-10-CM: N95.1  ICD-9-CM: 627.2       Hydronephrosis of right kidney     ICD-10-CM: N13.30  ICD-9-CM: 591       Atrophic vaginitis     ICD-10-CM: N95.2  ICD-9-CM: 627.3       Healthcare maintenance     ICD-10-CM: Z00.00  ICD-9-CM: V70.0       History of nonmelanoma skin cancer     ICD-10-CM: Z85.828  ICD-9-CM: V10.83       Chronic pain of both knees     ICD-10-CM: M25.561, M25.562, G89.29  ICD-9-CM: 719.46, 338.29       Age-related osteoporosis without current pathological fracture     ICD-10-CM: M81.0  ICD-9-CM: 733.01       Recurrent major depressive disorder, in partial remission     ICD-10-CM: F33.41  ICD-9-CM: 296.35       Class 2 severe obesity with serious comorbidity and body mass index (BMI)  of 37.0 to 37.9 in adult, unspecified obesity type     ICD-10-CM: E66.812, E66.01, Z68.37  ICD-9-CM: 278.01, V85.37       Hyperglycemia     ICD-10-CM: R73.9  ICD-9-CM: 790.29           I spent 41 minutes caring for Tennille Gasca on this date of service. This time includes time spent by me in the following activities:reviewing tests, performing a medically appropriate examination and/or evaluation , counseling and educating the patient/family/caregiver, ordering medications, tests, or procedures and documenting information in the medical record

## 2025-04-25 ENCOUNTER — EXTERNAL PBMM DATA (OUTPATIENT)
Dept: PHARMACY | Facility: OTHER | Age: 80
End: 2025-04-25
Payer: MEDICARE

## 2025-06-10 ENCOUNTER — LAB (OUTPATIENT)
Dept: FAMILY MEDICINE CLINIC | Facility: CLINIC | Age: 80
End: 2025-06-10
Payer: MEDICARE

## 2025-06-10 ENCOUNTER — TELEPHONE (OUTPATIENT)
Dept: FAMILY MEDICINE CLINIC | Facility: CLINIC | Age: 80
End: 2025-06-10

## 2025-06-10 DIAGNOSIS — R30.0 DYSURIA: Primary | ICD-10-CM

## 2025-06-10 PROCEDURE — 87077 CULTURE AEROBIC IDENTIFY: CPT | Performed by: GENERAL PRACTICE

## 2025-06-10 PROCEDURE — 81001 URINALYSIS AUTO W/SCOPE: CPT | Performed by: GENERAL PRACTICE

## 2025-06-10 PROCEDURE — 87186 SC STD MICRODIL/AGAR DIL: CPT | Performed by: GENERAL PRACTICE

## 2025-06-10 PROCEDURE — 87086 URINE CULTURE/COLONY COUNT: CPT | Performed by: GENERAL PRACTICE

## 2025-06-10 RX ORDER — CEPHALEXIN 500 MG/1
500 CAPSULE ORAL 3 TIMES DAILY
Qty: 15 CAPSULE | Refills: 0 | Status: SHIPPED | OUTPATIENT
Start: 2025-06-10 | End: 2025-06-15

## 2025-06-10 NOTE — TELEPHONE ENCOUNTER
Caller: Tennille Gasca    Relationship: Self    Best call back number: [phone]     What medication are you requesting: ANTIBIOTIC    What are your current symptoms: BURNING AND STRAINING WITH URINATION    How long have you been experiencing symptoms: 6 AM THIS MORNING.    Have you had these symptoms before:    [x] Yes  [] No    Have you been treated for these symptoms before:   [x] Yes  [] No    If a prescription is needed, what is your preferred pharmacy and phone number:      [address] [place] [address] - [phone]  - [phone]  [phone]     Additional notes: PLEASE ADVISE PATIENT IF NEEDS AN APPOINTMENT.

## 2025-06-11 LAB
BACTERIA UR QL AUTO: ABNORMAL /HPF
BILIRUB UR QL STRIP: NEGATIVE
CLARITY UR: CLEAR
COLOR UR: YELLOW
GLUCOSE UR STRIP-MCNC: NEGATIVE MG/DL
HGB UR QL STRIP.AUTO: ABNORMAL
HYALINE CASTS UR QL AUTO: ABNORMAL /LPF
KETONES UR QL STRIP: NEGATIVE
LEUKOCYTE ESTERASE UR QL STRIP.AUTO: ABNORMAL
NITRITE UR QL STRIP: NEGATIVE
PH UR STRIP.AUTO: 6.5 [PH] (ref 5–8)
PROT UR QL STRIP: NEGATIVE
RBC # UR STRIP: ABNORMAL /HPF
REF LAB TEST METHOD: ABNORMAL
SP GR UR STRIP: 1.01 (ref 1–1.03)
SQUAMOUS #/AREA URNS HPF: ABNORMAL /HPF
UROBILINOGEN UR QL STRIP: ABNORMAL
WBC # UR STRIP: ABNORMAL /HPF

## 2025-06-12 LAB — BACTERIA SPEC AEROBE CULT: ABNORMAL

## 2025-06-16 DIAGNOSIS — I10 ESSENTIAL HYPERTENSION: ICD-10-CM

## 2025-06-16 RX ORDER — LOSARTAN POTASSIUM 50 MG/1
TABLET ORAL
Qty: 45 TABLET | Refills: 5 | Status: SHIPPED | OUTPATIENT
Start: 2025-06-16

## 2025-06-23 ENCOUNTER — TELEPHONE (OUTPATIENT)
Dept: FAMILY MEDICINE CLINIC | Facility: CLINIC | Age: 80
End: 2025-06-23

## 2025-06-23 DIAGNOSIS — R30.0 DYSURIA: Primary | ICD-10-CM

## 2025-06-23 RX ORDER — CIPROFLOXACIN 500 MG/1
500 TABLET, FILM COATED ORAL 2 TIMES DAILY
Qty: 20 TABLET | Refills: 0 | Status: SHIPPED | OUTPATIENT
Start: 2025-06-23 | End: 2025-07-03

## 2025-06-23 NOTE — TELEPHONE ENCOUNTER
Caller: Tennille Gasca    Relationship: Self    Best call back number: [phone]     What medication are you requesting: MEDICATION FOR SYMPTOMS     What are your current symptoms: FREQUENT URINATION, URGENT URINATION, PAIN DURING URINATION    How long have you been experiencing symptoms: 6/23    Have you had these symptoms before:    [x] Yes  [] No    Have you been treated for these symptoms before:   [x] Yes  [] No    If a prescription is needed, what is your preferred pharmacy and phone number: [facility] [address] [place] [address] - [phone]  - [phone]      Additional notes: PLEASE CALL PATIENT IF AND WHEN MEDICATION CAN BE SENT TO PHARMACY.

## 2025-06-23 NOTE — TELEPHONE ENCOUNTER
Inform the patient I sent a prescription for Cipro to [facility] pharmacy for her possible UTI.

## 2025-07-08 NOTE — TELEPHONE ENCOUNTER
Chief Complaint  Earache (Left ear hurting)    Subjective            Laxmi Jc presents to [facility]  Earache  Associated symptoms: no dizziness, no cough and no congestion        History of Present Illness  The patient presents today regarding a left earache.    She has been experiencing recurrent ear infections, which she believes are not related to swimming. The current episode started 2 days ago with ear pain that progressively worsened. Yesterday, the pain extended from her ear down her neck, and this morning, she was unable to open her mouth upon waking. She reports no fever, congestion, cough, or wheezing but does experience occasional sneezing. She also reports headaches and mild dizziness but has not experienced any seizures, fainting, or vision changes. She has consulted 3 ENT specialists and undergone a CT scan, all of which were normal. The ENT specialists diagnosed her with dry ear, but her rheumatologist suspects it may be related to Crohn's disease. She describes severe inflammation in the ear canal that extends down her face, causing swelling and soft tissue inflammation. She has tried various eardrops, including FLOXIN OTIC, but they all cause her entire ear to swell. The only substance she can tolerate in her ear is oil. She has previously taken Z-Sonny for her condition.    She has been informed by her dentist that she clenches her teeth and uses a bite guard most nights. If she skips a night, she experiences severe pain. She has been prescribed a muscle relaxer for nighttime use. She has previously taken muscle relaxers following shoulder surgery and tolerated them well.    PAST SURGICAL HISTORY:  Shoulder surgery    SOCIAL HISTORY  She trains soldiers for combat.      PHQ-2 Total Score: 0    PHQ-9 Total Score:        Past Medical History:   Diagnosis Date    Adhesive capsulitis of left shoulder     Allergic     Anxiety     HX OF NO CURRENT ISSUES AND NOT ON  Pt is aware of this information.       ----- Message from Alfonso Rubi sent at 7/23/2024 10:55 AM EDT -----  I've sent a script for cipro  Thanks  ----- Message -----  From: Caroline Garcia MA  Sent: 7/23/2024  10:31 AM EDT  To: Alfonso Rubi MD    Pt dropped off a urine sample yesterday and called back this morning saying she was in sever pain and wanted to know if you could send her in some medicine to keep her from going to the ER.   ANY MEDICATIONS    Low back pain     HAS HAD 1 EPISODE OF LOWER BACK PAIN HAD SPRAINED IT. NO CURRENT ISSUES    Migraines     Palpitations     DENIES CP/SOA. REPORTS STARTED POST COVID.  FOLLOWED BY DR GUADARRAMA YEARLY. ACTIVE    Pituitary adenoma     FOLLOWED BY DR [facility]EBAN [facility]    Polymenorrhea     PT REPORTS HAS PERIODS ABOUT EVERY 2 WEEKS    Scleroderma     nonreactive    Tear of left rotator cuff        Allergies   Allergen Reactions    Floxin Otic [Ofloxacin] Other (See Comments)     Swelling of ear canal     Penicillins Rash and Other (See Comments)    Sulfa Antibiotics Rash and Other (See Comments)    Sulfamethoxazole-Trimethoprim Rash        Past Surgical History:   Procedure Laterality Date    BREAST BIOPSY Left     COLONOSCOPY  2024    FAT GRAFTING Left     REPAIR OF EXCISIONAL BX LEFT    HYSTERECTOMY  2024    KIDNEY STONE SURGERY      OVARIAN CYST REMOVAL      SHOULDER ARTHROSCOPY W/ ROTATOR CUFF REPAIR Left 2023    Procedure: LEFT SHOULDER MANIPULATION,  ARTHROSCOPY ROTATOR CUFF DEBRIDEMENT AND SUBACROMIAL DECOMPRESSION , labral debridement, subpectoral biceps tenodesis;  Surgeon: Jv Norton MD;  Location[place] [facility] [place]  Service: Orthopedics;  Laterality: Left;        Social History     Tobacco Use    Smoking status: Former     Current packs/day: 0.00     Average packs/day: 0.5 packs/day for 10.0 years (5.0 ttl pk-yrs)     Types: Cigarettes     Start date:      Quit date: 3/1/2024     Years since quittin.3     Passive exposure: Current    Smokeless tobacco: Never    Tobacco comments:     Quit    Vaping Use    Vaping status: Never Used   Substance Use Topics    Alcohol use: Never    Drug use: Never       Family History   Problem Relation Age of Onset    Cancer Mother         Colon & smoldering multiple myeloma    Kidney disease Mother     Hepatitis Mother     Autoimmune disease Mother     Bleeding Disorder Mother     Clotting disorder Mother     Rheumatologic  disease Mother         Autoimmune Hepatitus    Collagen disease Mother         Autoimmune Hepatitus    Hyperlipidemia Father     Hypertension Father     Diabetes Father     Stroke Father     Kidney disease Father     Rheumatologic disease Father         Rheumatoid Arthritis    Clotting disorder Sister         Von sara disease    Clotting disorder Sister         Von sara    Stomach cancer Maternal Grandmother     Cancer Maternal Grandmother         Gall Bladder Cancer    Malig Hyperthermia Neg Hx         Health Maintenance Due   Topic Date Due    ANNUAL PHYSICAL  01/17/2025        Current Outpatient Medications on File Prior to Visit   Medication Sig    BD Pen Needle Jocelyn 2nd Gen 32G X 4 MM misc     brompheniramine-pseudoephedrine-DM 30-2-10 MG/5ML syrup Take 10 mL by mouth 4 (Four) Times a Day As Needed for Congestion or Cough.    cetirizine (zyrTEC) 10 MG tablet Take 1 tablet by mouth Daily.    ibuprofen (ADVIL,MOTRIN) 800 MG tablet TAKE 1 TABLET EVERY 8 HOURS AS NEEDED FOR MILD PAIN    metoprolol succinate XL (TOPROL-XL) 25 MG 24 hr tablet Take 1 tablet by mouth 2 (Two) Times a Day. Indications: Supraventricular Tachycardia, palpitations    Norditropin FlexPro 15 MG/1.5ML solution pen-injector     oxybutynin XL (DITROPAN-XL) 5 MG 24 hr tablet Take 1 tablet by mouth Daily.    riFAXIMin (Xifaxan) 550 MG tablet Take 1 tablet by mouth Every 8 (Eight) Hours for 14 days.    Wegovy 0.25 MG/0.5ML solution auto-injector      No current facility-administered medications on file prior to visit.       Immunization History   Administered Date(s) Administered    COVID-19 (MODERNA) 1st,2nd,3rd Dose Monovalent 03/24/2021, 04/21/2021    Fluzone (or Fluarix & Flulaval for VFC) >6mos 11/05/2019, 01/17/2024    Hepatitis A 07/20/2018    PPD Test 08/28/2017    Pneumococcal Conjugate 20-Valent (PCV20) 01/17/2024    Tdap 07/20/2018       Review of Systems   Constitutional:  Negative for fever.   HENT:  Positive for ear pain  "and sneezing. Negative for congestion.    Eyes:  Negative for blurred vision and double vision.   Respiratory:  Negative for cough and wheezing.    Gastrointestinal:  Negative for nausea and vomiting.   Musculoskeletal:  Negative for arthralgias.   Neurological:  Positive for light-headedness and headache. Negative for dizziness, seizures and syncope.        Objective     /72   Pulse 93   Temp 97.7 °F (36.5 °C) (Temporal)   Ht 157.5 cm (62\")   Wt 69.9 kg (154 lb)   SpO2 97%   BMI 28.17 kg/m²       Physical Exam  Vitals and nursing note reviewed.   Constitutional:       Appearance: Normal appearance.   HENT:      Head: Normocephalic.      Right Ear: Tympanic membrane, ear canal and external ear normal. There is no impacted cerumen.      Left Ear: Swelling and tenderness present. There is no impacted cerumen. Tympanic membrane is erythematous.      Ears:      Comments: Pt unable to use any antibiotic gtts      Nose: Nose normal.      Mouth/Throat:      Mouth: Mucous membranes are moist.   Eyes:      Pupils: Pupils are equal, round, and reactive to light.   Cardiovascular:      Rate and Rhythm: Normal rate and regular rhythm.      Heart sounds: Normal heart sounds.   Pulmonary:      Effort: Pulmonary effort is normal.      Breath sounds: Normal breath sounds.   Abdominal:      Palpations: Abdomen is soft.   Musculoskeletal:         General: Normal range of motion.      Cervical back: Normal range of motion and neck supple.   Skin:     General: Skin is warm and dry.   Neurological:      Mental Status: She is alert and oriented to person, place, and time.   Psychiatric:         Mood and Affect: Mood normal.         Behavior: Behavior normal.         Thought Content: Thought content normal.         Judgment: Judgment normal.         Result Review :     The following data was reviewed by: ISAAC Amin on 07/08/2025:               Results  Imaging   - CT scan of the ear: Normal per pt report  CT IAC " Without Contrast (06/09/2021 18:41)   Office Visit with Edward Sow MD (12/21/2022)            Assessment and Plan      Diagnoses and all orders for this visit:    1. Non-recurrent acute serous otitis media of left ear (Primary)  -     azithromycin (Zithromax Z-Sonny) 250 MG tablet; Take 2 tablets by mouth on day 1, then 1 tablet daily on days 2-5  Dispense: 6 tablet; Refill: 0    2. Arthralgia of left temporomandibular joint  -     cyclobenzaprine (FLEXERIL) 10 MG tablet; Take 1 tablet by mouth At Night As Needed for Muscle Spasms (TMJ).  Dispense: 3 tablet; Refill: 2  -     methylPREDNISolone (MEDROL) 4 MG dose pack; Take as directed on package instructions.  Dispense: 21 each; Refill: 0    3. Limited jaw range of motion  -     cyclobenzaprine (FLEXERIL) 10 MG tablet; Take 1 tablet by mouth At Night As Needed for Muscle Spasms (TMJ).  Dispense: 3 tablet; Refill: 2  -     methylPREDNISolone (MEDROL) 4 MG dose pack; Take as directed on package instructions.  Dispense: 21 each; Refill: 0          Assessment & Plan  1. Otitis media.  - Symptoms include earache, headache, and limited jaw motion.  - Examination revealed redness in the ear canal.  - A prescription for Z-Sonny will be sent to pharmacy.  - A work note for 3 days will be provided.    2. Temporomandibular joint syndrome.  - Symptoms include limited range of motion of the jaw and clenching at night.  - The patient has been advised to use a muscle relaxer at night to prevent clenching.  - A prescription for Flexeril will be provided.  - The patient has been advised to consider getting a custom orthotic from a specialist to alleviate pain and prevent further damage.          Follow Up     Return if symptoms worsen or fail to improve.    Patient was given instructions and counseling regarding her condition or for health maintenance advice. Please see specific information pulled into the AVS if appropriate.            Laxmi Jc  reports that she  quit smoking about 16 months ago. Her smoking use included cigarettes. She started smoking about 7 years ago. She has a 5 pack-year smoking history. She has been exposed to tobacco smoke. She has never used smokeless tobacco. I have educated her on the risk of diseases from using tobacco products such as cancer, COPD, and heart disease.           Patient or patient representative verbalized consent for the use of Ambient Listening during the visit with  ISAAC Amin for chart documentation. 7/8/2025  16:16 EDT

## 2025-07-14 DIAGNOSIS — E03.4 HYPOTHYROIDISM DUE TO ACQUIRED ATROPHY OF THYROID: ICD-10-CM

## 2025-07-14 RX ORDER — LEVOTHYROXINE SODIUM 112 MCG
112 TABLET ORAL DAILY
Qty: 30 TABLET | Refills: 5 | Status: SHIPPED | OUTPATIENT
Start: 2025-07-14

## 2025-07-21 ENCOUNTER — LAB (OUTPATIENT)
Dept: FAMILY MEDICINE CLINIC | Facility: CLINIC | Age: 80
End: 2025-07-21
Payer: MEDICARE

## 2025-07-21 DIAGNOSIS — N18.31 STAGE 3A CHRONIC KIDNEY DISEASE: ICD-10-CM

## 2025-07-21 DIAGNOSIS — I25.10 CORONARY ARTERY CALCIFICATION: ICD-10-CM

## 2025-07-21 DIAGNOSIS — I47.10 PAROXYSMAL SVT (SUPRAVENTRICULAR TACHYCARDIA): ICD-10-CM

## 2025-07-21 DIAGNOSIS — E78.2 MIXED HYPERLIPIDEMIA: ICD-10-CM

## 2025-07-21 DIAGNOSIS — R73.9 HYPERGLYCEMIA: ICD-10-CM

## 2025-07-21 DIAGNOSIS — E03.4 HYPOTHYROIDISM DUE TO ACQUIRED ATROPHY OF THYROID: ICD-10-CM

## 2025-07-21 DIAGNOSIS — I10 ESSENTIAL HYPERTENSION: ICD-10-CM

## 2025-07-21 LAB
ALBUMIN SERPL-MCNC: 4 G/DL (ref 3.5–5.2)
ALBUMIN/GLOB SERPL: 1.4 G/DL
ALP SERPL-CCNC: 100 U/L (ref 39–117)
ALT SERPL W P-5'-P-CCNC: 15 U/L (ref 1–33)
ANION GAP SERPL CALCULATED.3IONS-SCNC: 9.9 MMOL/L (ref 5–15)
AST SERPL-CCNC: 22 U/L (ref 1–32)
BASOPHILS # BLD AUTO: 0.13 10*3/MM3 (ref 0–0.2)
BASOPHILS NFR BLD AUTO: 2 % (ref 0–1.5)
BILIRUB SERPL-MCNC: 0.4 MG/DL (ref 0–1.2)
BUN SERPL-MCNC: 21.4 MG/DL (ref 8–23)
BUN/CREAT SERPL: 19.1 (ref 7–25)
CALCIUM SPEC-SCNC: 9 MG/DL (ref 8.6–10.5)
CHLORIDE SERPL-SCNC: 105 MMOL/L (ref 98–107)
CHOLEST SERPL-MCNC: 172 MG/DL (ref 0–200)
CO2 SERPL-SCNC: 23.1 MMOL/L (ref 22–29)
CREAT SERPL-MCNC: 1.12 MG/DL (ref 0.57–1)
DEPRECATED RDW RBC AUTO: 41.8 FL (ref 37–54)
EGFRCR SERPLBLD CKD-EPI 2021: 50.4 ML/MIN/1.73
EOSINOPHIL # BLD AUTO: 0.28 10*3/MM3 (ref 0–0.4)
EOSINOPHIL NFR BLD AUTO: 4.2 % (ref 0.3–6.2)
ERYTHROCYTE [DISTWIDTH] IN BLOOD BY AUTOMATED COUNT: 12.6 % (ref 12.3–15.4)
GLOBULIN UR ELPH-MCNC: 2.9 GM/DL
GLUCOSE SERPL-MCNC: 96 MG/DL (ref 65–99)
HBA1C MFR BLD: 5.61 % (ref 4.8–5.6)
HCT VFR BLD AUTO: 34 % (ref 34–46.6)
HDLC SERPL-MCNC: 50 MG/DL (ref 40–60)
HGB BLD-MCNC: 11.4 G/DL (ref 12–15.9)
IMM GRANULOCYTES # BLD AUTO: 0.01 10*3/MM3 (ref 0–0.05)
IMM GRANULOCYTES NFR BLD AUTO: 0.2 % (ref 0–0.5)
LDLC SERPL CALC-MCNC: 109 MG/DL (ref 0–100)
LDLC/HDLC SERPL: 2.18 {RATIO}
LYMPHOCYTES # BLD AUTO: 1.62 10*3/MM3 (ref 0.7–3.1)
LYMPHOCYTES NFR BLD AUTO: 24.4 % (ref 19.6–45.3)
MCH RBC QN AUTO: 30.6 PG (ref 26.6–33)
MCHC RBC AUTO-ENTMCNC: 33.5 G/DL (ref 31.5–35.7)
MCV RBC AUTO: 91.4 FL (ref 79–97)
MONOCYTES # BLD AUTO: 0.63 10*3/MM3 (ref 0.1–0.9)
MONOCYTES NFR BLD AUTO: 9.5 % (ref 5–12)
NEUTROPHILS NFR BLD AUTO: 3.96 10*3/MM3 (ref 1.7–7)
NEUTROPHILS NFR BLD AUTO: 59.7 % (ref 42.7–76)
NRBC BLD AUTO-RTO: 0 /100 WBC (ref 0–0.2)
PLATELET # BLD AUTO: 392 10*3/MM3 (ref 140–450)
PMV BLD AUTO: 10.3 FL (ref 6–12)
POTASSIUM SERPL-SCNC: 5.1 MMOL/L (ref 3.5–5.2)
PROT SERPL-MCNC: 6.9 G/DL (ref 6–8.5)
RBC # BLD AUTO: 3.72 10*6/MM3 (ref 3.77–5.28)
SODIUM SERPL-SCNC: 138 MMOL/L (ref 136–145)
TRIGL SERPL-MCNC: 65 MG/DL (ref 0–150)
TSH SERPL DL<=0.05 MIU/L-ACNC: 6.1 UIU/ML (ref 0.27–4.2)
VLDLC SERPL-MCNC: 13 MG/DL (ref 5–40)
WBC NRBC COR # BLD AUTO: 6.63 10*3/MM3 (ref 3.4–10.8)

## 2025-07-21 PROCEDURE — 80053 COMPREHEN METABOLIC PANEL: CPT | Performed by: GENERAL PRACTICE

## 2025-07-21 PROCEDURE — 80061 LIPID PANEL: CPT | Performed by: GENERAL PRACTICE

## 2025-07-21 PROCEDURE — 36415 COLL VENOUS BLD VENIPUNCTURE: CPT

## 2025-07-21 PROCEDURE — 84443 ASSAY THYROID STIM HORMONE: CPT | Performed by: GENERAL PRACTICE

## 2025-07-21 PROCEDURE — 85025 COMPLETE CBC W/AUTO DIFF WBC: CPT | Performed by: GENERAL PRACTICE

## 2025-07-21 PROCEDURE — 83036 HEMOGLOBIN GLYCOSYLATED A1C: CPT | Performed by: GENERAL PRACTICE

## 2025-07-24 ENCOUNTER — EXTERNAL PBMM DATA (OUTPATIENT)
Dept: PHARMACY | Facility: OTHER | Age: 80
End: 2025-07-24
Payer: MEDICARE

## 2025-07-25 ENCOUNTER — OFFICE VISIT (OUTPATIENT)
Dept: FAMILY MEDICINE CLINIC | Facility: CLINIC | Age: 80
End: 2025-07-25
Payer: MEDICARE

## 2025-07-25 DIAGNOSIS — M25.562 CHRONIC PAIN OF BOTH KNEES: ICD-10-CM

## 2025-07-25 DIAGNOSIS — N18.31 STAGE 3A CHRONIC KIDNEY DISEASE: ICD-10-CM

## 2025-07-25 DIAGNOSIS — I47.10 PAROXYSMAL SVT (SUPRAVENTRICULAR TACHYCARDIA): Primary | ICD-10-CM

## 2025-07-25 DIAGNOSIS — G89.29 CHRONIC PAIN OF BOTH KNEES: ICD-10-CM

## 2025-07-25 DIAGNOSIS — N95.1 MENOPAUSAL SYMPTOM: ICD-10-CM

## 2025-07-25 DIAGNOSIS — E78.2 MIXED HYPERLIPIDEMIA: ICD-10-CM

## 2025-07-25 DIAGNOSIS — M81.0 AGE-RELATED OSTEOPOROSIS WITHOUT CURRENT PATHOLOGICAL FRACTURE: ICD-10-CM

## 2025-07-25 DIAGNOSIS — E55.9 VITAMIN D DEFICIENCY: ICD-10-CM

## 2025-07-25 DIAGNOSIS — R73.03 PREDIABETES: ICD-10-CM

## 2025-07-25 DIAGNOSIS — F33.41 RECURRENT MAJOR DEPRESSIVE DISORDER, IN PARTIAL REMISSION: ICD-10-CM

## 2025-07-25 DIAGNOSIS — Z00.00 HEALTHCARE MAINTENANCE: ICD-10-CM

## 2025-07-25 DIAGNOSIS — K59.04 CHRONIC IDIOPATHIC CONSTIPATION: ICD-10-CM

## 2025-07-25 DIAGNOSIS — N13.30 HYDRONEPHROSIS OF RIGHT KIDNEY: ICD-10-CM

## 2025-07-25 DIAGNOSIS — Z87.440 HISTORY OF RECURRENT UTIS: ICD-10-CM

## 2025-07-25 DIAGNOSIS — E66.812 CLASS 2 SEVERE OBESITY WITH SERIOUS COMORBIDITY AND BODY MASS INDEX (BMI) OF 37.0 TO 37.9 IN ADULT, UNSPECIFIED OBESITY TYPE: ICD-10-CM

## 2025-07-25 DIAGNOSIS — I25.10 CORONARY ARTERY CALCIFICATION: ICD-10-CM

## 2025-07-25 DIAGNOSIS — I10 ESSENTIAL HYPERTENSION: ICD-10-CM

## 2025-07-25 DIAGNOSIS — E66.01 CLASS 2 SEVERE OBESITY WITH SERIOUS COMORBIDITY AND BODY MASS INDEX (BMI) OF 37.0 TO 37.9 IN ADULT, UNSPECIFIED OBESITY TYPE: ICD-10-CM

## 2025-07-25 DIAGNOSIS — I70.0 ABDOMINAL AORTIC ATHEROSCLEROSIS: ICD-10-CM

## 2025-07-25 DIAGNOSIS — E03.4 HYPOTHYROIDISM DUE TO ACQUIRED ATROPHY OF THYROID: ICD-10-CM

## 2025-07-25 DIAGNOSIS — Z85.828 HISTORY OF NONMELANOMA SKIN CANCER: ICD-10-CM

## 2025-07-25 DIAGNOSIS — M25.561 CHRONIC PAIN OF BOTH KNEES: ICD-10-CM

## 2025-07-25 DIAGNOSIS — Z12.31 ENCOUNTER FOR SCREENING MAMMOGRAM FOR BREAST CANCER: ICD-10-CM

## 2025-07-25 DIAGNOSIS — E66.811 CLASS 1 OBESITY WITH SERIOUS COMORBIDITY AND BODY MASS INDEX (BMI) OF 34.0 TO 34.9 IN ADULT, UNSPECIFIED OBESITY TYPE: ICD-10-CM

## 2025-07-25 DIAGNOSIS — K80.20 ASYMPTOMATIC CHOLELITHIASIS: ICD-10-CM

## 2025-07-25 RX ORDER — ROSUVASTATIN CALCIUM 10 MG/1
10 TABLET, COATED ORAL DAILY
Qty: 30 TABLET | Refills: 5 | Status: SHIPPED | OUTPATIENT
Start: 2025-07-25

## 2025-07-25 NOTE — PROGRESS NOTES
Subjective   Tennille Gasca is a 80 y.o. female.     Chief Complaint  She returns for a scheduled reassessment of multiple medical problems including recurrent urinary tract infections, chronic renal failure, essential hypertension, prediabetes, hypothyroidism, depression, and osteoporosis    History of Present Illness     Recurrent Urinary Tract Infections  She denies any dysuria, frequency, nocturia, urgency, hematuria, vaginal discharge or bleeding at present. Ultrasound of the abdomen performed on 9/18/2024 revealed mild right hydronephrosis and she was referred to urology.  She established urology care with Griselda Cheng-Akwa APRN on 9/30/2024 and a noncontrast CT of the abdomen was performed on 10/14/2024 with evidence of coronary artery calcifications, cardiomegaly, large hiatal hernia, cholelithiasis, aortic atherosclerosis, degenerative changes of the spine, but no evidence of hydronephrosis.  She was prescribed sennoside-docusate 2 daily and at present is doing well.  She was to undergo a reassessment with Dr. Haider on 6/12/2025 but canceled this and has yet to reschedule    Chronic Renal Failure  She denies any dysuria, hematuria, nausea, edema, pruritus, or weight loss.  She has a long history of hypertension as well as recurrent urinary tract infections.  She denies any NSAID use. Recent hepatitis B and C serology was negative  Lab Results   Component Value Date    GLUCOSE 96 07/21/2025    BUN 21.4 07/21/2025    CREATININE 1.12 (H) 07/21/2025     07/21/2025    K 5.1 07/21/2025     07/21/2025    CALCIUM 9.0 07/21/2025    PROTEINTOT 6.9 07/21/2025    ALBUMIN 4.0 07/21/2025    ALT 15 07/21/2025    AST 22 07/21/2025    ALKPHOS 100 07/21/2025    BILITOT 0.4 07/21/2025    GLOB 2.9 07/21/2025    AGRATIO 1.4 07/21/2025    BCR 19.1 07/21/2025    ANIONGAP 9.9 07/21/2025    EGFR 50.4 (L) 07/21/2025     Lab Results   Component Value Date    WBC 6.63 07/21/2025    HGB 11.4 (L) 07/21/2025    HCT 34.0  07/21/2025    MCV 91.4 07/21/2025     07/21/2025     Essential Hypertension  She has remained fairly active and feels that her shortness of breath is at baseline.  There is no history of any orthopnea or PND, and she continues to deny any chest pain, palpitations lightheadedness, or swelling of the ankles.  She remains on losartan 50 nightly with no apparent side effects.  48-hour Holter monitoring completed on 3/1/2023 revealed 2 short 8-14 beat runs of SVT (possible atrial flutter).  Echocardiogram performed on 3/19/2023 was reported as showing grade 1 diastolic dysfunction, sclerosis of the aortic valve with mild stenosis, mild MR and TR, but normal systolic function with an estimated EF of 61 to 65%.  Stress testing performed on 3/16/2023 revealed no evidence of inducible ischemia    Hyperlipidemia  She stopped atorvastatin after several weeks due to lower extremity muscle aches with a prompt improvement.  Lab Results   Component Value Date    CHOL 172 07/21/2025    CHLPL 197 03/17/2016    TRIG 65 07/21/2025    HDL 50 07/21/2025     (H) 07/21/2025     Prediabetes  She is on no medication for this  Lab Results   Component Value Date    HGBA1C 5.61 (H) 07/21/2025     Hypothyroidism  She admits to fatigue, and struggles with her weight. Her hypothyroidism is due to Hashimoto's disease. She remains on levothyroxine 112 daily.   Lab Results   Component Value Date    TSH 6.100 (H) 07/21/2025     Depression  She has a long history of intermittent nervousness, and worrying.  There is no history of any depression at present and she continues to deny any anhedonia, difficulty concentrating, impaired memory,  or suicidal thoughts.  With melatonin she continues to average 7-8 hours of sleep nightly.    History of process  DEXA scan performed on 9/19/2024 returned with a T-score as low as -2.6 at the left femoral neck. Most recent vitamin D 72    The following portions of the patient's history were reviewed and  updated as appropriate: allergies, current medications, past medical history, past social history, and problem list.    Review of Systems   Constitutional:  Positive for fatigue. Negative for chills and fever.   HENT:  Negative for congestion, ear pain, hearing loss, postnasal drip, rhinorrhea, sinus pressure, sneezing and sore throat.    Eyes:  Negative for visual disturbance.   Respiratory:  Negative for cough, shortness of breath and wheezing.    Cardiovascular:  Negative for chest pain, palpitations and leg swelling.   Gastrointestinal:  Negative for abdominal pain, blood in stool, constipation, diarrhea, nausea, vomiting and GERD.   Genitourinary:  Negative for dysuria, flank pain, frequency, hematuria, pelvic pain, urgency, urinary incontinence and vaginal pain.   Musculoskeletal:  Positive for arthralgias. Negative for back pain, joint swelling and myalgias.   Skin:  Negative for rash.   Neurological:  Negative for weakness and numbness.   Psychiatric/Behavioral:  Negative for decreased concentration, hallucinations, sleep disturbance and depressed mood. The patient is nervous/anxious.      Objective   Physical Exam  Constitutional:       General: She is not in acute distress.     Appearance: Normal appearance. She is well-developed. She is not diaphoretic.      Comments: Bright and in good spirits. No apparent distress. No pallor, jaundice, diaphoresis, or cyanosis.   HENT:      Head: Atraumatic.      Right Ear: Tympanic membrane, ear canal and external ear normal.      Left Ear: Tympanic membrane, ear canal and external ear normal.      Mouth/Throat:      Lips: No lesions.      Mouth: Mucous membranes are moist. No oral lesions.      Pharynx: No oropharyngeal exudate or posterior oropharyngeal erythema.   Eyes:      General: Lids are normal.      Extraocular Movements: Extraocular movements intact.      Conjunctiva/sclera: Conjunctivae normal.      Pupils: Pupils are equal.   Neck:      Thyroid: No thyroid  mass or thyromegaly.      Vascular: No carotid bruit or JVD.      Trachea: Trachea normal. No tracheal deviation.   Cardiovascular:      Rate and Rhythm: Normal rate and regular rhythm.      Heart sounds: Normal heart sounds, S1 normal and S2 normal. No murmur heard.     No gallop.   Pulmonary:      Effort: Pulmonary effort is normal.      Breath sounds: Normal breath sounds.   Abdominal:      General: Bowel sounds are normal. There is no distension.   Musculoskeletal:      Right lower leg: No edema.      Left lower leg: No edema.   Lymphadenopathy:      Head:      Right side of head: No submental, submandibular, tonsillar, preauricular, posterior auricular or occipital adenopathy.      Left side of head: No submental, submandibular, tonsillar, preauricular, posterior auricular or occipital adenopathy.      Cervical: No cervical adenopathy.      Upper Body:      Right upper body: No supraclavicular adenopathy.      Left upper body: No supraclavicular adenopathy.   Skin:     General: Skin is warm.      Coloration: Skin is not cyanotic, jaundiced or pale.      Findings: No rash.      Nails: There is no clubbing.   Neurological:      Mental Status: She is alert and oriented to person, place, and time.      Cranial Nerves: No cranial nerve deficit, dysarthria or facial asymmetry.      Sensory: No sensory deficit.      Motor: No tremor.      Coordination: Coordination normal.      Gait: Gait normal.   Psychiatric:         Attention and Perception: Attention normal.         Mood and Affect: Mood normal.         Speech: Speech normal.         Behavior: Behavior normal.         Thought Content: Thought content normal.       Assessment & Plan   Problems Addressed this Visit          Cardiac and Vasculature    Abdominal aortic atherosclerosis  Reminded regarding the importance of risk factor modification.  Continue low-dose ASA.    Relevant Orders    CBC & Differential    Coronary artery calcification  As above.    Relevant  Orders    CBC & Differential    Essential hypertension   Hypertension: at goal. Evidence of target organ damage: chronic kidney disease and evidence of atherosclerosis and coronary artery calcifications on previous imaging.  Encouraged to continue to work on diet and exercise plan.   Continue current medication    Relevant Orders    CBC & Differential    Comprehensive Metabolic Panel    Mixed hyperlipidemia  As above.  Agreed on a trial of rosuvastatin  Scheduled for updated labs just prior to her return.    Relevant Medications    rosuvastatin (CRESTOR) 10 MG tablet    Other Relevant Orders    Comprehensive Metabolic Panel    Lipid Panel    Paroxysmal SVT (supraventricular tachycardia)    Relevant Orders    CBC & Differential       Endocrine and Metabolic    Class 1 obesity with serious comorbidity and body mass index (BMI) of 34.0 to 34.9 in adult    Relevant Medications    phentermine 37.5 MG capsule    Hypothyroidism due to acquired atrophy of thyroid  Clinically euthyroid.  Continue current medication.    Relevant Orders    TSH    Prediabetes  As above.  Reviewed options.  Given CV risk and CRF, agreed on a trial of semaglutide if approved by her insurance    Relevant Medications    Semaglutide,0.25 or 0.5MG/DOS, (Ozempic, 0.25 or 0.5 MG/DOSE,) 2 MG/3ML solution pen-injector    Other Relevant Orders    CBC & Differential    Hemoglobin A1c    Vitamin D deficiency       Gastrointestinal Abdominal     Asymptomatic cholelithiasis    Chronic idiopathic constipation       Genitourinary and Reproductive     Encounter for screening mammogram for breast cancer    Relevant Orders    Mammo Screening Digital Tomosynthesis Bilateral With CAD    History of recurrent UTIs    Hydronephrosis of right kidney    Menopausal symptom    Stage 3a chronic kidney disease  Reminded to avoid any NSAIDs prescription or OTC  As above.    Relevant Orders    CBC & Differential    Comprehensive Metabolic Panel    Microalbumin / Creatinine  Urine Ratio - Urine, Clean Catch       Health Encounters    Healthcare maintenance  Reminded to get a flu shot when available.  Will arrange an updated mammogram    Relevant Orders    Mammo Screening Digital Tomosynthesis Bilateral With CAD       Hematology and Neoplasia    History of nonmelanoma skin cancer       Mental Health    Recurrent major depressive disorder, in partial remission    Stable.  Supportive therapy.   Encouraged to report if any worse or if any new symptoms or concerns.           Musculoskeletal and Injuries    Age-related osteoporosis without current pathological fracture    Bilateral knee pain    Relevant Medications    Diclofenac Sodium (VOLTAREN) 1 % gel gel     Diagnoses         Codes Comments      Paroxysmal SVT (supraventricular tachycardia)    -  Primary ICD-10-CM: I47.10  ICD-9-CM: 427.0       Mixed hyperlipidemia     ICD-10-CM: E78.2  ICD-9-CM: 272.2       Essential hypertension     ICD-10-CM: I10  ICD-9-CM: 401.9       Coronary artery calcification     ICD-10-CM: I25.10  ICD-9-CM: 414.00       Abdominal aortic atherosclerosis     ICD-10-CM: I70.0  ICD-9-CM: 440.0       Vitamin D deficiency     ICD-10-CM: E55.9  ICD-9-CM: 268.9       Hypothyroidism due to acquired atrophy of thyroid     ICD-10-CM: E03.4  ICD-9-CM: 244.8, 246.8       Class 1 obesity with serious comorbidity and body mass index (BMI) of 34.0 to 34.9 in adult, unspecified obesity type     ICD-10-CM: E66.811, Z68.34  ICD-9-CM: 278.00, V85.34       Chronic idiopathic constipation     ICD-10-CM: K59.04  ICD-9-CM: 564.00       Asymptomatic cholelithiasis     ICD-10-CM: K80.20  ICD-9-CM: 574.20       Stage 3a chronic kidney disease     ICD-10-CM: N18.31  ICD-9-CM: 585.3       Hydronephrosis of right kidney     ICD-10-CM: N13.30  ICD-9-CM: 591       Menopausal symptom     ICD-10-CM: N95.1  ICD-9-CM: 627.2       History of recurrent UTIs     ICD-10-CM: Z87.440  ICD-9-CM: V13.02       Healthcare maintenance     ICD-10-CM:  Z00.00  ICD-9-CM: V70.0       History of nonmelanoma skin cancer     ICD-10-CM: Z85.828  ICD-9-CM: V10.83       Recurrent major depressive disorder, in partial remission     ICD-10-CM: F33.41  ICD-9-CM: 296.35       Chronic pain of both knees     ICD-10-CM: M25.561, M25.562, G89.29  ICD-9-CM: 719.46, 338.29       Age-related osteoporosis without current pathological fracture     ICD-10-CM: M81.0  ICD-9-CM: 733.01       Class 2 severe obesity with serious comorbidity and body mass index (BMI) of 37.0 to 37.9 in adult, unspecified obesity type     ICD-10-CM: E66.812, E66.01, Z68.37  ICD-9-CM: 278.01, V85.37       Prediabetes     ICD-10-CM: R73.03  ICD-9-CM: 790.29       Encounter for screening mammogram for breast cancer     ICD-10-CM: Z12.31  ICD-9-CM: V76.12

## 2025-07-26 VITALS
BODY MASS INDEX: 34.55 KG/M2 | RESPIRATION RATE: 14 BRPM | HEIGHT: 66 IN | WEIGHT: 215 LBS | TEMPERATURE: 98.6 F | HEART RATE: 81 BPM | SYSTOLIC BLOOD PRESSURE: 130 MMHG | DIASTOLIC BLOOD PRESSURE: 72 MMHG | OXYGEN SATURATION: 96 %

## 2025-07-26 PROBLEM — R73.03 PREDIABETES: Status: ACTIVE | Noted: 2025-07-26

## 2025-07-26 RX ORDER — PHENTERMINE HYDROCHLORIDE 37.5 MG/1
37.5 CAPSULE ORAL EVERY MORNING
Qty: 30 CAPSULE | Refills: 3 | Status: SHIPPED | OUTPATIENT
Start: 2025-07-26

## 2025-07-26 RX ORDER — SEMAGLUTIDE 0.68 MG/ML
INJECTION, SOLUTION SUBCUTANEOUS
Qty: 3 ML | Refills: 2 | Status: SHIPPED | OUTPATIENT
Start: 2025-07-26

## 2025-07-28 ENCOUNTER — TELEPHONE (OUTPATIENT)
Dept: FAMILY MEDICINE CLINIC | Facility: CLINIC | Age: 80
End: 2025-07-28

## 2025-07-29 ENCOUNTER — PATIENT OUTREACH (OUTPATIENT)
Dept: FAMILY MEDICINE CLINIC | Facility: CLINIC | Age: 80
End: 2025-07-29
Payer: MEDICARE

## 2025-07-29 NOTE — OUTREACH NOTE
Called and spoke to patient and made her aware mammogram is scheduled for 9/18/25 @ 1:45 pm at [facility]

## 2025-08-21 ENCOUNTER — EXTERNAL PBMM DATA (OUTPATIENT)
Dept: PHARMACY | Facility: OTHER | Age: 80
End: 2025-08-21
Payer: MEDICARE

## 2025-08-29 ENCOUNTER — LAB (OUTPATIENT)
Dept: FAMILY MEDICINE CLINIC | Facility: CLINIC | Age: 80
End: 2025-08-29
Payer: MEDICARE

## 2025-08-29 DIAGNOSIS — Z87.440 HISTORY OF RECURRENT UTIS: Primary | ICD-10-CM

## 2025-08-29 LAB

## 2025-08-29 PROCEDURE — 87086 URINE CULTURE/COLONY COUNT: CPT | Performed by: GENERAL PRACTICE

## 2025-08-29 PROCEDURE — 87077 CULTURE AEROBIC IDENTIFY: CPT | Performed by: GENERAL PRACTICE

## 2025-08-29 PROCEDURE — 81001 URINALYSIS AUTO W/SCOPE: CPT | Performed by: GENERAL PRACTICE

## 2025-08-29 PROCEDURE — 87186 SC STD MICRODIL/AGAR DIL: CPT | Performed by: GENERAL PRACTICE

## 2025-08-29 RX ORDER — CEFDINIR 300 MG/1
300 CAPSULE ORAL 2 TIMES DAILY
Qty: 10 CAPSULE | Refills: 0 | Status: SHIPPED | OUTPATIENT
Start: 2025-08-29

## 2025-08-31 LAB — BACTERIA SPEC AEROBE CULT: ABNORMAL

## (undated) DEVICE — Device

## (undated) DEVICE — TUBING, SUCTION, 1/4" X 20', STRAIGHT: Brand: MEDLINE INDUSTRIES, INC.

## (undated) DEVICE — SUCTION CANISTER, 1500CC, RIGID: Brand: DEROYAL

## (undated) DEVICE — Device: Brand: DEFENDO AIR/WATER/SUCTION AND BIOPSY VALVE

## (undated) DEVICE — CONN Y IRR DISP 1P/U

## (undated) DEVICE — SYR LUERLOK 30CC

## (undated) DEVICE — GOWN,REINF,POLY,ECL,PP SLV,XL: Brand: MEDLINE

## (undated) DEVICE — ENDOGATOR AUXILIARY WATER JET CONNECTOR: Brand: ENDOGATOR